# Patient Record
Sex: MALE | Race: BLACK OR AFRICAN AMERICAN | NOT HISPANIC OR LATINO | URBAN - METROPOLITAN AREA
[De-identification: names, ages, dates, MRNs, and addresses within clinical notes are randomized per-mention and may not be internally consistent; named-entity substitution may affect disease eponyms.]

---

## 2017-07-26 ENCOUNTER — EMERGENCY (EMERGENCY)
Facility: HOSPITAL | Age: 27
LOS: 1 days | Discharge: HOME | End: 2017-07-26
Attending: EMERGENCY MEDICINE
Payer: SELF-PAY

## 2017-07-26 VITALS
TEMPERATURE: 99 F | HEART RATE: 109 BPM | DIASTOLIC BLOOD PRESSURE: 133 MMHG | WEIGHT: 315 LBS | RESPIRATION RATE: 20 BRPM | OXYGEN SATURATION: 99 % | SYSTOLIC BLOOD PRESSURE: 205 MMHG

## 2017-07-26 VITALS
RESPIRATION RATE: 20 BRPM | HEART RATE: 104 BPM | SYSTOLIC BLOOD PRESSURE: 188 MMHG | DIASTOLIC BLOOD PRESSURE: 110 MMHG | OXYGEN SATURATION: 100 %

## 2017-07-26 PROCEDURE — 99284 EMERGENCY DEPT VISIT MOD MDM: CPT

## 2017-07-26 PROCEDURE — 99283 EMERGENCY DEPT VISIT LOW MDM: CPT

## 2017-07-26 RX ORDER — AMLODIPINE BESYLATE 2.5 MG/1
1 TABLET ORAL
Qty: 30 | Refills: 0 | OUTPATIENT
Start: 2017-07-26 | End: 2017-08-25

## 2017-07-26 RX ORDER — LISINOPRIL 2.5 MG/1
1 TABLET ORAL
Qty: 15 | Refills: 0 | OUTPATIENT
Start: 2017-07-26 | End: 2017-08-10

## 2017-07-26 RX ORDER — LISINOPRIL 2.5 MG/1
40 TABLET ORAL DAILY
Qty: 0 | Refills: 0 | Status: DISCONTINUED | OUTPATIENT
Start: 2017-07-26 | End: 2017-07-30

## 2017-07-26 RX ORDER — AMLODIPINE BESYLATE 2.5 MG/1
10 TABLET ORAL ONCE
Qty: 0 | Refills: 0 | Status: COMPLETED | OUTPATIENT
Start: 2017-07-26 | End: 2017-07-26

## 2017-07-26 RX ADMIN — AMLODIPINE BESYLATE 10 MILLIGRAM(S): 2.5 TABLET ORAL at 12:01

## 2017-07-26 RX ADMIN — LISINOPRIL 40 MILLIGRAM(S): 2.5 TABLET ORAL at 13:31

## 2017-07-26 NOTE — ED STATDOCS - CONSTITUTIONAL, MLM
normal... well appearing, well nourished, and in no apparent distress. heavy set pt in no acute distress

## 2017-07-26 NOTE — ED ADULT NURSE NOTE - OBJECTIVE STATEMENT
pt c/p lac to right wrist.  no futher complaints.  pt noted to have elevated HR and elevated BP.  assymptomatic, denies chest pain or shortness of breath.  pt states he has a hx of elevated BP but has not been taking his medications due to recent move.  medicated as ordered.  will continue to monitor.

## 2017-07-26 NOTE — ED ADULT NURSE NOTE - CHIEF COMPLAINT QUOTE
pt with lac to left wrist which he reports getting with a drill at work yesterday. pt is hypertensive at triage

## 2017-07-26 NOTE — ED STATDOCS - PROGRESS NOTE DETAILS
Pt seen and evaluated. 26 yo M pmh HTN presented to ED s/p puncture wound yesterday sustained with a drill. dT UTD. Pt noted to be HTN on arrival. pt states he was on Lisinopril 40mg but has been off x 4-5 months since moving from [place]. Pt reports recent blood work < 1 year and all normal. Pt denies HA, CP, Dizziness. Right wrist lateral aspect with superficial puncture wound/deep abrasion.  No bony ttp. FROM. No muscle or tendon invol. no erythema.  A/P 1. HTN- CCB and reeval   2. Superficial wound- local wound care

## 2017-07-26 NOTE — ED STATDOCS - OBJECTIVE STATEMENT
26 y/o M w/ PMHx of HTN presents to the ED c/o R wrist lacerations s/p a drill striking his wrist yesterday. Pt states he last received a tetanus shot 2 years ago. He works construction for a living. Pt denies numbness, tingling or any other complaints at this time.

## 2017-07-26 NOTE — ED STATDOCS - ATTENDING CONTRIBUTION TO CARE
I, Connor Chavez, performed the initial face to face bedside interview with this patient regarding history of present illness, review of symptoms and relevant past medical, social and family history.  I completed an independent physical examination.  I was the initial provider who evaluated this patient. I have signed out the follow up of any pending tests (i.e. labs, radiological studies) to the ACP.  I have communicated the patient’s plan of care and disposition with the ACP.

## 2019-02-27 ENCOUNTER — EMERGENCY (EMERGENCY)
Facility: HOSPITAL | Age: 29
LOS: 1 days | Discharge: HOME | End: 2019-02-27
Attending: STUDENT IN AN ORGANIZED HEALTH CARE EDUCATION/TRAINING PROGRAM
Payer: COMMERCIAL

## 2019-02-27 VITALS
DIASTOLIC BLOOD PRESSURE: 154 MMHG | RESPIRATION RATE: 20 BRPM | HEART RATE: 90 BPM | OXYGEN SATURATION: 100 % | TEMPERATURE: 98 F | SYSTOLIC BLOOD PRESSURE: 232 MMHG

## 2019-02-27 VITALS
WEIGHT: 289.91 LBS | OXYGEN SATURATION: 100 % | TEMPERATURE: 98 F | SYSTOLIC BLOOD PRESSURE: 170 MMHG | DIASTOLIC BLOOD PRESSURE: 151 MMHG | HEIGHT: 72 IN | RESPIRATION RATE: 20 BRPM | HEART RATE: 91 BPM

## 2019-02-27 PROBLEM — I10 ESSENTIAL (PRIMARY) HYPERTENSION: Chronic | Status: ACTIVE | Noted: 2017-07-27

## 2019-02-27 PROCEDURE — 99283 EMERGENCY DEPT VISIT LOW MDM: CPT | Mod: 25

## 2019-02-27 PROCEDURE — 73610 X-RAY EXAM OF ANKLE: CPT | Mod: 26,RT

## 2019-02-27 PROCEDURE — 73610 X-RAY EXAM OF ANKLE: CPT

## 2019-02-27 PROCEDURE — 96372 THER/PROPH/DIAG INJ SC/IM: CPT

## 2019-02-27 PROCEDURE — 99283 EMERGENCY DEPT VISIT LOW MDM: CPT

## 2019-02-27 RX ORDER — OXYCODONE AND ACETAMINOPHEN 5; 325 MG/1; MG/1
1 TABLET ORAL ONCE
Qty: 0 | Refills: 0 | Status: DISCONTINUED | OUTPATIENT
Start: 2019-02-27 | End: 2019-02-27

## 2019-02-27 RX ORDER — IBUPROFEN 200 MG
1 TABLET ORAL
Qty: 30 | Refills: 0 | OUTPATIENT
Start: 2019-02-27 | End: 2019-03-08

## 2019-02-27 RX ORDER — MORPHINE SULFATE 50 MG/1
4 CAPSULE, EXTENDED RELEASE ORAL ONCE
Qty: 0 | Refills: 0 | Status: DISCONTINUED | OUTPATIENT
Start: 2019-02-27 | End: 2019-02-27

## 2019-02-27 RX ORDER — KETOROLAC TROMETHAMINE 30 MG/ML
60 SYRINGE (ML) INJECTION ONCE
Qty: 0 | Refills: 0 | Status: DISCONTINUED | OUTPATIENT
Start: 2019-02-27 | End: 2019-02-27

## 2019-02-27 RX ADMIN — Medication 60 MILLIGRAM(S): at 16:20

## 2019-02-27 RX ADMIN — Medication 0.1 MILLIGRAM(S): at 16:52

## 2019-02-27 RX ADMIN — OXYCODONE AND ACETAMINOPHEN 1 TABLET(S): 5; 325 TABLET ORAL at 16:20

## 2019-02-27 NOTE — ED PROVIDER NOTE - PROGRESS NOTE DETAILS
GUSTAVO FULLER: contact [facility]. reading no fracture  pt splinted and given crutches with FU with ortho   discussed consequences of HTN and non compliance with medication   educated need to fu HTN with PMD and that condition can be life threatening blood pressure decreasing remains asymptomatic  again reiterating managing HTN

## 2019-02-27 NOTE — ED PROVIDER NOTE - CLINICAL SUMMARY MEDICAL DECISION MAKING FREE TEXT BOX
right ankle injury at work.  pain control antihypertensive medication and xray to eval for fracture.

## 2019-02-27 NOTE — ED PROVIDER NOTE - ATTENDING CONTRIBUTION TO CARE
I performed a face to face history and physical exam of the patient and discussed their management with the resident/ACP. I reviewed the resident/ACP's note and agree with the documented findings and plan of care.    Pt states that he was at work when he had an injury to his R ankle. Pt states that he has had pain and swelling since then.  Pt states that he has a hx of htn but has not had meds in 2 years.  no cp, sob, numbness/weakness.    physical - R ankle with Lateral mal ttp and swelling.    plan - XR reviewed and negative.  splint placed. Pt given clonidine in ER. Pre-discharge BP had improved to 206/150. Pt asymptomatic.  Pt instructed to f/up with pcp or [facility] clinic for new rx for bp medications. pt instructed to return for cp, sob, headache, numbness/weakness, or any other concerns.

## 2019-02-27 NOTE — ED PROVIDER NOTE - OBJECTIVE STATEMENT
28 year old male hx of HTN non compliant with medication presenting to ER after injury at work when he was hit in the right foot by a tractor. states that incident occurred around 3 pm. no pain medication taken. unable to walk on the foot. no numbness or tingling. pain primarily to the lateral aspect of the ankle. no calf pain. no cp sob, headache, nausea or visual changes.  states that he does not have insurance so he does not take medication for HTN

## 2019-02-27 NOTE — ED PROVIDER NOTE - PHYSICAL EXAMINATION
RIGHT ANKLE: left lateral ankle deformity. with ecchymosis and swelling to the lateral malleolous. + TTP over lateral mal. pain with dorsiflexion and plantar flexion 2+ DP pulse. sensation intact. no calf tenderness

## 2019-02-27 NOTE — ED ADULT TRIAGE NOTE - CHIEF COMPLAINT QUOTE
pt arrive by ambulance after right foot was ran over at work. pt was wearing steel toe boot. (BP noted, hx HTN does not take meds)

## 2019-03-12 ENCOUNTER — INPATIENT (INPATIENT)
Facility: HOSPITAL | Age: 29
LOS: 3 days | Discharge: HOME | DRG: 71 | End: 2019-03-16
Attending: HOSPITALIST | Admitting: INTERNAL MEDICINE
Payer: MEDICAID

## 2019-03-12 VITALS
OXYGEN SATURATION: 100 % | DIASTOLIC BLOOD PRESSURE: 136 MMHG | RESPIRATION RATE: 19 BRPM | HEART RATE: 118 BPM | SYSTOLIC BLOOD PRESSURE: 184 MMHG | TEMPERATURE: 98 F | HEIGHT: 72 IN | WEIGHT: 285.06 LBS

## 2019-03-12 DIAGNOSIS — R56.9 UNSPECIFIED CONVULSIONS: ICD-10-CM

## 2019-03-12 DIAGNOSIS — I16.1 HYPERTENSIVE EMERGENCY: ICD-10-CM

## 2019-03-12 DIAGNOSIS — I10 ESSENTIAL (PRIMARY) HYPERTENSION: ICD-10-CM

## 2019-03-12 LAB
ALBUMIN SERPL ELPH-MCNC: 4.5 G/DL — SIGNIFICANT CHANGE UP (ref 3.3–5.2)
ALP SERPL-CCNC: 61 U/L — SIGNIFICANT CHANGE UP (ref 40–120)
ALT FLD-CCNC: 23 U/L — SIGNIFICANT CHANGE UP
AMPHET UR-MCNC: NEGATIVE — SIGNIFICANT CHANGE UP
ANION GAP SERPL CALC-SCNC: 18 MMOL/L — HIGH (ref 5–17)
ANISOCYTOSIS BLD QL: SLIGHT — SIGNIFICANT CHANGE UP
APAP SERPL-MCNC: <7.5 UG/ML — LOW (ref 10–26)
APPEARANCE UR: CLEAR — SIGNIFICANT CHANGE UP
AST SERPL-CCNC: 19 U/L — SIGNIFICANT CHANGE UP
BACTERIA # UR AUTO: ABNORMAL
BARBITURATES UR SCN-MCNC: NEGATIVE — SIGNIFICANT CHANGE UP
BASOPHILS # BLD AUTO: 0 K/UL — SIGNIFICANT CHANGE UP (ref 0–0.2)
BASOPHILS NFR BLD AUTO: 0.3 % — SIGNIFICANT CHANGE UP (ref 0–2)
BENZODIAZ UR-MCNC: NEGATIVE — SIGNIFICANT CHANGE UP
BILIRUB SERPL-MCNC: 0.4 MG/DL — SIGNIFICANT CHANGE UP (ref 0.4–2)
BILIRUB UR-MCNC: NEGATIVE — SIGNIFICANT CHANGE UP
BUN SERPL-MCNC: 12 MG/DL — SIGNIFICANT CHANGE UP (ref 8–20)
CALCIUM SERPL-MCNC: 9.9 MG/DL — SIGNIFICANT CHANGE UP (ref 8.6–10.2)
CHLORIDE SERPL-SCNC: 99 MMOL/L — SIGNIFICANT CHANGE UP (ref 98–107)
CO2 SERPL-SCNC: 24 MMOL/L — SIGNIFICANT CHANGE UP (ref 22–29)
COCAINE METAB.OTHER UR-MCNC: NEGATIVE — SIGNIFICANT CHANGE UP
COLOR SPEC: YELLOW — SIGNIFICANT CHANGE UP
CREAT SERPL-MCNC: 1.24 MG/DL — SIGNIFICANT CHANGE UP (ref 0.5–1.3)
DIFF PNL FLD: ABNORMAL
EOSINOPHIL # BLD AUTO: 0.3 K/UL — SIGNIFICANT CHANGE UP (ref 0–0.5)
EOSINOPHIL NFR BLD AUTO: 3.4 % — SIGNIFICANT CHANGE UP (ref 0–5)
EPI CELLS # UR: SIGNIFICANT CHANGE UP
ETHANOL SERPL-MCNC: <10 MG/DL — SIGNIFICANT CHANGE UP
GLUCOSE SERPL-MCNC: 147 MG/DL — HIGH (ref 70–115)
GLUCOSE UR QL: 50 MG/DL
HCT VFR BLD CALC: 40.5 % — LOW (ref 42–52)
HGB BLD-MCNC: 15.1 G/DL — SIGNIFICANT CHANGE UP (ref 14–18)
KETONES UR-MCNC: NEGATIVE — SIGNIFICANT CHANGE UP
LEUKOCYTE ESTERASE UR-ACNC: ABNORMAL
LYMPHOCYTES # BLD AUTO: 2.2 K/UL — SIGNIFICANT CHANGE UP (ref 1–4.8)
LYMPHOCYTES # BLD AUTO: 22.8 % — SIGNIFICANT CHANGE UP (ref 20–55)
MACROCYTES BLD QL: SLIGHT — SIGNIFICANT CHANGE UP
MAGNESIUM SERPL-MCNC: 2.1 MG/DL — SIGNIFICANT CHANGE UP (ref 1.6–2.6)
MCHC RBC-ENTMCNC: 32 PG — HIGH (ref 27–31)
MCHC RBC-ENTMCNC: 37.3 G/DL — HIGH (ref 32–36)
MCV RBC AUTO: 85.8 FL — SIGNIFICANT CHANGE UP (ref 80–94)
METHADONE UR-MCNC: NEGATIVE — SIGNIFICANT CHANGE UP
MICROCYTES BLD QL: SLIGHT — SIGNIFICANT CHANGE UP
MONOCYTES # BLD AUTO: 0.8 K/UL — SIGNIFICANT CHANGE UP (ref 0–0.8)
MONOCYTES NFR BLD AUTO: 8.3 % — SIGNIFICANT CHANGE UP (ref 3–10)
NEUTROPHILS # BLD AUTO: 6.3 K/UL — SIGNIFICANT CHANGE UP (ref 1.8–8)
NEUTROPHILS NFR BLD AUTO: 64.7 % — SIGNIFICANT CHANGE UP (ref 37–73)
NITRITE UR-MCNC: NEGATIVE — SIGNIFICANT CHANGE UP
OPIATES UR-MCNC: NEGATIVE — SIGNIFICANT CHANGE UP
OVALOCYTES BLD QL SMEAR: SLIGHT — SIGNIFICANT CHANGE UP
PCP SPEC-MCNC: SIGNIFICANT CHANGE UP
PCP UR-MCNC: NEGATIVE — SIGNIFICANT CHANGE UP
PH UR: 7 — SIGNIFICANT CHANGE UP (ref 5–8)
PLAT MORPH BLD: NORMAL — SIGNIFICANT CHANGE UP
PLATELET # BLD AUTO: 239 K/UL — SIGNIFICANT CHANGE UP (ref 150–400)
POIKILOCYTOSIS BLD QL AUTO: SLIGHT — SIGNIFICANT CHANGE UP
POTASSIUM SERPL-MCNC: 3.3 MMOL/L — LOW (ref 3.5–5.3)
POTASSIUM SERPL-SCNC: 3.3 MMOL/L — LOW (ref 3.5–5.3)
PROT SERPL-MCNC: 7.8 G/DL — SIGNIFICANT CHANGE UP (ref 6.6–8.7)
PROT UR-MCNC: 30 MG/DL
RBC # BLD: 4.72 M/UL — SIGNIFICANT CHANGE UP (ref 4.6–6.2)
RBC # FLD: 13 % — SIGNIFICANT CHANGE UP (ref 11–15.6)
RBC BLD AUTO: ABNORMAL
RBC CASTS # UR COMP ASSIST: ABNORMAL /HPF (ref 0–4)
SALICYLATES SERPL-MCNC: <0.6 MG/DL — LOW (ref 10–20)
SODIUM SERPL-SCNC: 141 MMOL/L — SIGNIFICANT CHANGE UP (ref 135–145)
SP GR SPEC: 1.01 — SIGNIFICANT CHANGE UP (ref 1.01–1.02)
THC UR QL: POSITIVE
TROPONIN T SERPL-MCNC: 0.04 NG/ML — SIGNIFICANT CHANGE UP (ref 0–0.06)
TSH SERPL-MCNC: 1.8 UIU/ML — SIGNIFICANT CHANGE UP (ref 0.27–4.2)
UROBILINOGEN FLD QL: NEGATIVE MG/DL — SIGNIFICANT CHANGE UP
WBC # BLD: 9.8 K/UL — SIGNIFICANT CHANGE UP (ref 4.8–10.8)
WBC # FLD AUTO: 9.8 K/UL — SIGNIFICANT CHANGE UP (ref 4.8–10.8)
WBC UR QL: ABNORMAL

## 2019-03-12 PROCEDURE — 70450 CT HEAD/BRAIN W/O DYE: CPT | Mod: 26

## 2019-03-12 PROCEDURE — 99223 1ST HOSP IP/OBS HIGH 75: CPT

## 2019-03-12 PROCEDURE — 74174 CTA ABD&PLVS W/CONTRAST: CPT | Mod: 26

## 2019-03-12 PROCEDURE — 71045 X-RAY EXAM CHEST 1 VIEW: CPT | Mod: 26

## 2019-03-12 PROCEDURE — 71275 CT ANGIOGRAPHY CHEST: CPT | Mod: 26

## 2019-03-12 PROCEDURE — 99291 CRITICAL CARE FIRST HOUR: CPT

## 2019-03-12 RX ORDER — OXYCODONE HYDROCHLORIDE 5 MG/1
5 TABLET ORAL EVERY 6 HOURS
Qty: 0 | Refills: 0 | Status: DISCONTINUED | OUTPATIENT
Start: 2019-03-12 | End: 2019-03-16

## 2019-03-12 RX ORDER — POTASSIUM CHLORIDE 20 MEQ
40 PACKET (EA) ORAL ONCE
Qty: 0 | Refills: 0 | Status: COMPLETED | OUTPATIENT
Start: 2019-03-12 | End: 2019-03-12

## 2019-03-12 RX ORDER — SODIUM CHLORIDE 9 MG/ML
1000 INJECTION INTRAMUSCULAR; INTRAVENOUS; SUBCUTANEOUS ONCE
Qty: 0 | Refills: 0 | Status: COMPLETED | OUTPATIENT
Start: 2019-03-12 | End: 2019-03-12

## 2019-03-12 RX ORDER — AMLODIPINE BESYLATE 2.5 MG/1
10 TABLET ORAL DAILY
Qty: 0 | Refills: 0 | Status: DISCONTINUED | OUTPATIENT
Start: 2019-03-13 | End: 2019-03-13

## 2019-03-12 RX ORDER — ACETAMINOPHEN 500 MG
975 TABLET ORAL ONCE
Qty: 0 | Refills: 0 | Status: COMPLETED | OUTPATIENT
Start: 2019-03-12 | End: 2019-03-12

## 2019-03-12 RX ORDER — LABETALOL HCL 100 MG
400 TABLET ORAL THREE TIMES A DAY
Qty: 0 | Refills: 0 | Status: DISCONTINUED | OUTPATIENT
Start: 2019-03-12 | End: 2019-03-13

## 2019-03-12 RX ORDER — ENOXAPARIN SODIUM 100 MG/ML
40 INJECTION SUBCUTANEOUS DAILY
Qty: 0 | Refills: 0 | Status: DISCONTINUED | OUTPATIENT
Start: 2019-03-12 | End: 2019-03-16

## 2019-03-12 RX ORDER — LEVETIRACETAM 250 MG/1
500 TABLET, FILM COATED ORAL EVERY 12 HOURS
Qty: 0 | Refills: 0 | Status: DISCONTINUED | OUTPATIENT
Start: 2019-03-12 | End: 2019-03-14

## 2019-03-12 RX ORDER — MORPHINE SULFATE 50 MG/1
4 CAPSULE, EXTENDED RELEASE ORAL ONCE
Qty: 0 | Refills: 0 | Status: DISCONTINUED | OUTPATIENT
Start: 2019-03-12 | End: 2019-03-13

## 2019-03-12 RX ORDER — HYDROCHLOROTHIAZIDE 25 MG
25 TABLET ORAL DAILY
Qty: 0 | Refills: 0 | Status: DISCONTINUED | OUTPATIENT
Start: 2019-03-12 | End: 2019-03-14

## 2019-03-12 RX ORDER — HYDRALAZINE HCL 50 MG
10 TABLET ORAL ONCE
Qty: 0 | Refills: 0 | Status: COMPLETED | OUTPATIENT
Start: 2019-03-12 | End: 2019-03-12

## 2019-03-12 RX ORDER — AMLODIPINE BESYLATE 2.5 MG/1
10 TABLET ORAL ONCE
Qty: 0 | Refills: 0 | Status: COMPLETED | OUTPATIENT
Start: 2019-03-12 | End: 2019-03-12

## 2019-03-12 RX ORDER — NICARDIPINE HYDROCHLORIDE 30 MG/1
5 CAPSULE, EXTENDED RELEASE ORAL
Qty: 40 | Refills: 0 | Status: DISCONTINUED | OUTPATIENT
Start: 2019-03-12 | End: 2019-03-13

## 2019-03-12 RX ORDER — LABETALOL HCL 100 MG
10 TABLET ORAL ONCE
Qty: 0 | Refills: 0 | Status: COMPLETED | OUTPATIENT
Start: 2019-03-12 | End: 2019-03-12

## 2019-03-12 RX ORDER — ACETAMINOPHEN 500 MG
650 TABLET ORAL EVERY 6 HOURS
Qty: 0 | Refills: 0 | Status: DISCONTINUED | OUTPATIENT
Start: 2019-03-12 | End: 2019-03-16

## 2019-03-12 RX ADMIN — SODIUM CHLORIDE 1000 MILLILITER(S): 9 INJECTION INTRAMUSCULAR; INTRAVENOUS; SUBCUTANEOUS at 16:41

## 2019-03-12 RX ADMIN — Medication 10 MILLIGRAM(S): at 16:42

## 2019-03-12 RX ADMIN — Medication 10 MILLIGRAM(S): at 15:46

## 2019-03-12 RX ADMIN — Medication 975 MILLIGRAM(S): at 17:47

## 2019-03-12 RX ADMIN — Medication 975 MILLIGRAM(S): at 20:11

## 2019-03-12 RX ADMIN — NICARDIPINE HYDROCHLORIDE 25 MG/HR: 30 CAPSULE, EXTENDED RELEASE ORAL at 17:47

## 2019-03-12 RX ADMIN — SODIUM CHLORIDE 4000 MILLILITER(S): 9 INJECTION INTRAMUSCULAR; INTRAVENOUS; SUBCUTANEOUS at 15:10

## 2019-03-12 RX ADMIN — Medication 40 MILLIEQUIVALENT(S): at 16:42

## 2019-03-12 RX ADMIN — Medication 400 MILLIGRAM(S): at 22:36

## 2019-03-12 RX ADMIN — AMLODIPINE BESYLATE 10 MILLIGRAM(S): 2.5 TABLET ORAL at 16:42

## 2019-03-12 RX ADMIN — Medication 10 MILLIGRAM(S): at 15:09

## 2019-03-12 RX ADMIN — Medication 25 MILLIGRAM(S): at 20:40

## 2019-03-12 NOTE — ED PROVIDER NOTE - CLINICAL SUMMARY MEDICAL DECISION MAKING FREE TEXT BOX
28M p/w new onset ?seizure. Drug related vs. electrolyte abnormality vs. intracranial pathology. Will get labs, tox, CTH. 28M p/w new onset ?seizure vs syncope. Drug related vs. electrolyte abnormality vs. intracranial pathology. Will get labs, tox, CTH.

## 2019-03-12 NOTE — ED PROVIDER NOTE - OBJECTIVE STATEMENT
28M pmhx of HTN present for witnessed seizure at home, described by father as shaking for about 5 min and a period of confusion for about 10 min afterwards. Denies incontinence, tongue biting. Never had seizures, no fhx of seizures. Patient admits to smoking marijuana prior to event. Denies any other drug use or EtOH abuse, as a few drinks a week, last 2 days ago. Of note, had injured his right foot 2/22, had MRI done today. Was prescribed oxycodone for LE pain. Denies fevers, chills, N/V/D, headache, vision changes, urinary complaints.

## 2019-03-12 NOTE — H&P ADULT - NSHPREVIEWOFSYSTEMS_GEN_ALL_CORE
CONST: denies fevers, no chills  EYES: denies pain or vision changes  ENT: denies sore throat, ear pain, change in hearing  CV: denies CP, LE Edema, palpitations  RESP: denies SOB, cough  ABD: denies abdominal pain, no nausea, no vomiting, no diarrhea  : denies dysuria, flank pain, hematuria  MSK: denies back pain, + RL Extremity pain s/p injury  NEURO: denies HA, weakness, sensory deficits   HEME: denies easy bleeding, epistaxis  SKIN: denies rash

## 2019-03-12 NOTE — ED ADULT TRIAGE NOTE - CHIEF COMPLAINT QUOTE
Pt had a witnessed seizure that lasted approximately 5 mins by his father. Pt has no seizure history. Pt hypertensive and tachycardic at this time. Pt denies any headache or chest pain

## 2019-03-12 NOTE — ED PROVIDER NOTE - CRITICAL CARE PROVIDED
interpretation of diagnostic studies/consultation with other physicians/direct patient care (not related to procedure)/additional history taking/documentation/consult w/ pt's family directly relating to pts condition

## 2019-03-12 NOTE — H&P ADULT - NSICDXPROBLEM_GEN_ALL_CORE_FT
PROBLEM DIAGNOSES  Problem: Seizure  Assessment and Plan:     Problem: Hypertensive emergency  Assessment and Plan:       R/O PROBLEM DIAGNOSES  Problem: Posterior reversible encephalopathy syndrome  Assessment and Plan:

## 2019-03-12 NOTE — H&P ADULT - ASSESSMENT
27 y/o M, PMHx uncontrolled HTN, R Ankle Fx, p/w witnessed seizure, found to have hypertensive emergency.     Seizure likely 2/2 hypertensive emergency, r/o PRES syndrome    given Norvasc 10mg PO, Hydralazine 20mg IV, labetalol 10 mg IV  Cont titrating Nicardipine ggt,   EEG  Neuro Consult  MRI Brain w/ Gadolinium   monitor CBC, BMP  5mg Oxycodone prn for pain 27 yo male with known HTN, noncompliant with meds, presents with hypertensive emergency and witnessed tonic clonic seizure.   Being admitted to the ICU for nicardipine drip.    Plan:  1) Seizure:  Possibly due to PRES, will obtain MRI brain, EEG, needs neurology evaluation in AM (not yet called).  Started on Keppra.     2) Hypertensive emergency:  Started on PO labetalol and HCTZ in order to wean off nicardipine drip.  Its unusual for a young male to have this degree of resistant HTN, will order secondary HTN workup to rule out renal artery stenosis, pheochromocytoma.  TTE ordered as well.    3) Abdominal pain: had brief episode of severe abdominal pain in ICU, ordered for CTA to rule out dissection in setting of uncontrolled hypertension.

## 2019-03-12 NOTE — PHARMACOTHERAPY INTERVENTION NOTE - COMMENTS
Medication reconciliation completed with patient at bedside. Patient was on lisinopril 40 mG and amlodipine 10 mG for HTN management. He has not taken his blood pressure medications in more than 4 months due to insurance issues.

## 2019-03-12 NOTE — ED ADULT NURSE NOTE - NSIMPLEMENTINTERV_GEN_ALL_ED
Implemented All Fall Risk Interventions:  [place] to call system. Call bell, personal items and telephone within reach. Instruct patient to call for assistance. Room bathroom lighting operational. Non-slip footwear when patient is off stretcher. Physically safe environment: no spills, clutter or unnecessary equipment. Stretcher in lowest position, wheels locked, appropriate side rails in place. Provide visual cue, wrist band, yellow gown, etc. Monitor gait and stability. Monitor for mental status changes and reorient to person, place, and time. Review medications for side effects contributing to fall risk. Reinforce activity limits and safety measures with patient and family.

## 2019-03-12 NOTE — ED PROVIDER NOTE - PROGRESS NOTE DETAILS
patient remains asymptomatic from HTN, has not taken meds in >1 year. last in ED with /150. improved with IV meds, trop .02 and LVH on EKG. will continue to try and control BP with IV push and PO meds if needed gtt. will likely admit -Pura DO unable to control BP, now complaining of HA, neuro intact. will start cardene gtt, MICU consulted -Pura MILLS

## 2019-03-12 NOTE — ED PROVIDER NOTE - NS ED ROS FT
CONST: no fevers, no chills  EYES: no pain, no vision changes  ENT: no sore throat, no ear pain, no change in hearing  CV: no chest pain, no leg swelling  RESP: no shortness of breath, no cough  ABD: no abdominal pain, no nausea, no vomiting, no diarrhea  : no dysuria, no flank pain, no hematuria  MSK: no back pain, +RLE pain after injury  NEURO: no headache or additional neurologic complaints  HEME: no easy bleeding  SKIN:  no rash

## 2019-03-12 NOTE — ED ADULT NURSE REASSESSMENT NOTE - NS ED NURSE REASSESS COMMENT FT1
pt resting in bed no acute distress noted aox 3 moving all extremities equal bilaterally, offers no complaints.
pt BP is 200/111. MD Neves advised of BP. pt medicated as per orders. SO in bed with pt and father at bedside. pt remains sinus tach on cardiac monitor. pt educated on plan of care, pt able to successfully teach back plan of care to RN, RN will continue to reeducate pt during hospital stay.

## 2019-03-12 NOTE — H&P ADULT - NSHPPHYSICALEXAM_GEN_ALL_CORE
GENERAL: NAD, well groomed,   HEAD:  NC/AT  EYES: EOMI, PERRLA, sclera and conjunctiva clear  ENMT:  MMM, No oropharyngeal erythema or exudates  NECK:  Supple, trachea midline, no JVD   NERVOUS SYSTEM:  A&Ox3, CN 2-12 grossly intact, Strength 5/5 in all extremities, sensorium intact, no focal deficits  LUNG: CTA b/l, no wheezes, rales, rhonchi  HEART: RRR, No murmurs, rubs, or gallops  ABDOMEN: Soft, Obese, Nontender, No rebound or guarding, normoactive BS  EXTREMITIES:  No edema, clubbing, cyanosis.  LYMPH:  No lymphadenopathy noted  SKIN:  No visible rashes or skin lesions, good capillary refill  PSYCH: appropriate affect

## 2019-03-12 NOTE — ED ADULT NURSE NOTE - OBJECTIVE STATEMENT
pt had a witnessed seizure at home today that lasted 5 minutes family states pt has no history of seizures. pt arrived awake and alert with right lower leg brace from previous injury.

## 2019-03-12 NOTE — ED PROVIDER NOTE - PHYSICAL EXAMINATION
Liz Coronado, .:   GENERAL: Patient awake alert NAD.  HEENT: NC/AT, Moist mucous membranes, PERRL, EOMI, no tongue laceration.   LUNGS: CTAB, no wheezes or crackles.   CARDIAC: RRR, no m/r/g.    ABDOMEN: Obese, Soft, NT, ND, No rebound, guarding.   EXT: No edema. No calf tenderness. Leg boot and ace wrap present, removed.  MSK: No spinal tenderness, no pain with movement, no deformities.  NEURO: A&Ox3. Gait normal.    SKIN: Warm and dry. No rash.  PSYCH: Normal affect. Liz Coronado, .:   GENERAL: Patient awake alert NAD.  HEENT: NC/AT, Moist mucous membranes, PERRL, EOMI, no tongue laceration.   LUNGS: CTAB, no wheezes or crackles.   CARDIAC: RRR, no m/r/g.    ABDOMEN: Obese, Soft, NT, ND, No rebound, guarding.   EXT: No edema. No calf tenderness. Leg boot and ace wrap present, removed.  MSK: No spinal tenderness, no pain with movement, no deformities.  NEURO: A&Ox3. Moving all extremities.  SKIN: Warm and dry. No rash.  PSYCH: Normal affect.

## 2019-03-12 NOTE — H&P ADULT - NSHPLABSRESULTS_GEN_ALL_CORE
15.1   9.8   )-----------( 239      ( 12 Mar 2019 15:12 )             40.5    03-12    141  |  99  |  12.0  ----------------------------<  147<H>  3.3<L>   |  24.0  |  1.24    Ca    9.9      12 Mar 2019 15:12  Mg     2.1         TPro  7.8  /  Alb  4.5  /  TBili  0.4  /  DBili  x   /  AST  19  /  ALT  23  /  AlkPhos  61  03-12    3-12, 15:12  TSH: 1.8  Troponin: 0.02    Urinalysis Basic - ( 12 Mar 2019 16:33 )    Color: Yellow / Appearance: Clear / S.015 / pH: x  Gluc: x / Ketone: Negative  / Bili: Negative / Urobili: Negative mg/dL   Blood: x / Protein: 30 mg/dL / Nitrite: Negative   Leuk Esterase: Trace / RBC: 3-5 /HPF / WBC 6-10   Sq Epi: x / Non Sq Epi: Occasional / Bacteria: Occasional    Urine Toxicology 3/12  Benzodiazepine neg  THC positive  Cocaine metabolite neg  Amphetamine neg   Opiate neg  Barbituates neg  Phencyclidine neg  Methadone neg  Salicylate level <0.6  Acetaminophen level <7.5  Alcohol <10    CT Head no cont 3/12, 16:30    FINDINGS:      HEMISPHERES:  No mass or space occupying lesion.  No acute ischemic   changes or hemorrhagic foci are suggested.  VENTRICLES:  Midline and normal in size.  POSTERIOR FOSSA:  The brain stem and cerebellum are unremarkable.  No CP   angle lesion noted.  EXTRACEREBRAL SPACES:  No subdural or epidural collections are noted.  SKULL BASE AND CALVARIUM:  Appears intact.  No fracture or destructive   lesion is identified.  SINUSES AND MASTOIDS:  Clear.  MISCELLANEOUS:  No orbital or suprasellar abnormality noted.      IMPRESSION:    1)  unremarkable CT study of the brain  2)  clear sinuses and mastoids..    CXR 3/12, 15:12    Overlying EKG leads are noted. The heart and mediastinal contours are   normal.  No segmental infiltrates or effusions are identified. No   pneumothorax.       IMPRESSION: Negative Portable Chest.    12 Lead EKG 3/12, 14:44    Ventricular Rate 117 BPM    Atrial Rate 117 BPM    P-R Interval 160 ms    QRS Duration 102 ms    Q-T Interval 348 ms    QTC Calculation(Bezet) 485 ms    P Axis 15 degrees    R Axis -49 degrees    T Axis 127 degrees    Diagnosis Line Sinus tachycardia  Left axis deviation  Left ventricular hypertrophy with repolarization abnormality  Abnormal ECG    Confirmed by PEARL CHUA ([id number]) on 3/12/2019 5:47:09 PM

## 2019-03-13 LAB
ANION GAP SERPL CALC-SCNC: 15 MMOL/L — SIGNIFICANT CHANGE UP (ref 5–17)
APPEARANCE UR: CLEAR — SIGNIFICANT CHANGE UP
BACTERIA # UR AUTO: ABNORMAL
BILIRUB UR-MCNC: NEGATIVE — SIGNIFICANT CHANGE UP
BUN SERPL-MCNC: 10 MG/DL — SIGNIFICANT CHANGE UP (ref 8–20)
CALCIUM SERPL-MCNC: 9.1 MG/DL — SIGNIFICANT CHANGE UP (ref 8.6–10.2)
CHLORIDE SERPL-SCNC: 99 MMOL/L — SIGNIFICANT CHANGE UP (ref 98–107)
CO2 SERPL-SCNC: 24 MMOL/L — SIGNIFICANT CHANGE UP (ref 22–29)
COLOR SPEC: YELLOW — SIGNIFICANT CHANGE UP
CREAT SERPL-MCNC: 0.85 MG/DL — SIGNIFICANT CHANGE UP (ref 0.5–1.3)
DIFF PNL FLD: NEGATIVE — SIGNIFICANT CHANGE UP
EPI CELLS # UR: SIGNIFICANT CHANGE UP
GLUCOSE SERPL-MCNC: 139 MG/DL — HIGH (ref 70–115)
GLUCOSE UR QL: NEGATIVE MG/DL — SIGNIFICANT CHANGE UP
HCT VFR BLD CALC: 39 % — LOW (ref 42–52)
HGB BLD-MCNC: 14.7 G/DL — SIGNIFICANT CHANGE UP (ref 14–18)
KETONES UR-MCNC: NEGATIVE — SIGNIFICANT CHANGE UP
LEUKOCYTE ESTERASE UR-ACNC: ABNORMAL
MAGNESIUM SERPL-MCNC: 2.1 MG/DL — SIGNIFICANT CHANGE UP (ref 1.6–2.6)
MCHC RBC-ENTMCNC: 31.7 PG — HIGH (ref 27–31)
MCHC RBC-ENTMCNC: 37.7 G/DL — HIGH (ref 32–36)
MCV RBC AUTO: 84.1 FL — SIGNIFICANT CHANGE UP (ref 80–94)
NITRITE UR-MCNC: NEGATIVE — SIGNIFICANT CHANGE UP
PH UR: 7 — SIGNIFICANT CHANGE UP (ref 5–8)
PHOSPHATE SERPL-MCNC: 4.4 MG/DL — SIGNIFICANT CHANGE UP (ref 2.4–4.7)
PLATELET # BLD AUTO: 227 K/UL — SIGNIFICANT CHANGE UP (ref 150–400)
POTASSIUM SERPL-MCNC: 3.5 MMOL/L — SIGNIFICANT CHANGE UP (ref 3.5–5.3)
POTASSIUM SERPL-SCNC: 3.5 MMOL/L — SIGNIFICANT CHANGE UP (ref 3.5–5.3)
PROT UR-MCNC: NEGATIVE MG/DL — SIGNIFICANT CHANGE UP
RBC # BLD: 4.64 M/UL — SIGNIFICANT CHANGE UP (ref 4.6–6.2)
RBC # FLD: 13.2 % — SIGNIFICANT CHANGE UP (ref 11–15.6)
RBC CASTS # UR COMP ASSIST: SIGNIFICANT CHANGE UP /HPF (ref 0–4)
SODIUM SERPL-SCNC: 138 MMOL/L — SIGNIFICANT CHANGE UP (ref 135–145)
SP GR SPEC: 1 — LOW (ref 1.01–1.02)
UROBILINOGEN FLD QL: NEGATIVE MG/DL — SIGNIFICANT CHANGE UP
WBC # BLD: 12.4 K/UL — HIGH (ref 4.8–10.8)
WBC # FLD AUTO: 12.4 K/UL — HIGH (ref 4.8–10.8)
WBC UR QL: SIGNIFICANT CHANGE UP

## 2019-03-13 PROCEDURE — 70553 MRI BRAIN STEM W/O & W/DYE: CPT | Mod: 26

## 2019-03-13 PROCEDURE — 99232 SBSQ HOSP IP/OBS MODERATE 35: CPT

## 2019-03-13 PROCEDURE — 99223 1ST HOSP IP/OBS HIGH 75: CPT

## 2019-03-13 PROCEDURE — 95819 EEG AWAKE AND ASLEEP: CPT | Mod: 26

## 2019-03-13 PROCEDURE — 12345: CPT | Mod: NC

## 2019-03-13 PROCEDURE — 93975 VASCULAR STUDY: CPT | Mod: 26

## 2019-03-13 PROCEDURE — 99233 SBSQ HOSP IP/OBS HIGH 50: CPT

## 2019-03-13 PROCEDURE — ZZZZZ: CPT

## 2019-03-13 RX ORDER — POTASSIUM CHLORIDE 20 MEQ
40 PACKET (EA) ORAL ONCE
Qty: 0 | Refills: 0 | Status: DISCONTINUED | OUTPATIENT
Start: 2019-03-13 | End: 2019-03-13

## 2019-03-13 RX ORDER — LABETALOL HCL 100 MG
10 TABLET ORAL EVERY 6 HOURS
Qty: 0 | Refills: 0 | Status: DISCONTINUED | OUTPATIENT
Start: 2019-03-13 | End: 2019-03-13

## 2019-03-13 RX ORDER — INFLUENZA VIRUS VACCINE 15; 15; 15; 15 UG/.5ML; UG/.5ML; UG/.5ML; UG/.5ML
0.5 SUSPENSION INTRAMUSCULAR ONCE
Qty: 0 | Refills: 0 | Status: COMPLETED | OUTPATIENT
Start: 2019-03-13 | End: 2019-03-13

## 2019-03-13 RX ORDER — LISINOPRIL 2.5 MG/1
10 TABLET ORAL DAILY
Qty: 0 | Refills: 0 | Status: DISCONTINUED | OUTPATIENT
Start: 2019-03-13 | End: 2019-03-15

## 2019-03-13 RX ORDER — LABETALOL HCL 100 MG
200 TABLET ORAL THREE TIMES A DAY
Qty: 0 | Refills: 0 | Status: DISCONTINUED | OUTPATIENT
Start: 2019-03-13 | End: 2019-03-14

## 2019-03-13 RX ORDER — POTASSIUM CHLORIDE 20 MEQ
40 PACKET (EA) ORAL ONCE
Qty: 0 | Refills: 0 | Status: COMPLETED | OUTPATIENT
Start: 2019-03-13 | End: 2019-03-13

## 2019-03-13 RX ORDER — SODIUM CHLORIDE 9 MG/ML
1000 INJECTION, SOLUTION INTRAVENOUS ONCE
Qty: 0 | Refills: 0 | Status: COMPLETED | OUTPATIENT
Start: 2019-03-13 | End: 2019-03-13

## 2019-03-13 RX ORDER — LABETALOL HCL 100 MG
200 TABLET ORAL ONCE
Qty: 0 | Refills: 0 | Status: COMPLETED | OUTPATIENT
Start: 2019-03-13 | End: 2019-03-13

## 2019-03-13 RX ORDER — METOPROLOL TARTRATE 50 MG
5 TABLET ORAL EVERY 6 HOURS
Qty: 0 | Refills: 0 | Status: DISCONTINUED | OUTPATIENT
Start: 2019-03-13 | End: 2019-03-16

## 2019-03-13 RX ADMIN — Medication 40 MILLIEQUIVALENT(S): at 06:33

## 2019-03-13 RX ADMIN — SODIUM CHLORIDE 2000 MILLILITER(S): 9 INJECTION, SOLUTION INTRAVENOUS at 00:24

## 2019-03-13 RX ADMIN — Medication 200 MILLIGRAM(S): at 13:39

## 2019-03-13 RX ADMIN — Medication 40 MILLIEQUIVALENT(S): at 00:23

## 2019-03-13 RX ADMIN — LEVETIRACETAM 420 MILLIGRAM(S): 250 TABLET, FILM COATED ORAL at 06:33

## 2019-03-13 RX ADMIN — OXYCODONE HYDROCHLORIDE 5 MILLIGRAM(S): 5 TABLET ORAL at 12:45

## 2019-03-13 RX ADMIN — ENOXAPARIN SODIUM 40 MILLIGRAM(S): 100 INJECTION SUBCUTANEOUS at 11:36

## 2019-03-13 RX ADMIN — Medication 200 MILLIGRAM(S): at 08:30

## 2019-03-13 RX ADMIN — LEVETIRACETAM 420 MILLIGRAM(S): 250 TABLET, FILM COATED ORAL at 18:15

## 2019-03-13 RX ADMIN — OXYCODONE HYDROCHLORIDE 5 MILLIGRAM(S): 5 TABLET ORAL at 13:45

## 2019-03-13 RX ADMIN — NICARDIPINE HYDROCHLORIDE 25 MG/HR: 30 CAPSULE, EXTENDED RELEASE ORAL at 00:24

## 2019-03-13 RX ADMIN — LISINOPRIL 10 MILLIGRAM(S): 2.5 TABLET ORAL at 22:19

## 2019-03-13 RX ADMIN — Medication 200 MILLIGRAM(S): at 22:19

## 2019-03-13 NOTE — CONSULT NOTE ADULT - ASSESSMENT
The patient is a 28y Male who is followed by neurology because of new onset seizure in setting of accelerated hypertension    Seizure  Single episode  MRI brain and EEG normal  d/c keppra  no driving until cleared, discussed this with him and he understood this prohibition of driving.    HTN  try to keep normotensive    will follow with you    Drew Lugo MD PhD   [id number]

## 2019-03-13 NOTE — PHYSICAL THERAPY INITIAL EVALUATION ADULT - ASR WT BEARING STATUS EVAL
as per pt hx of right ankle fx, was provided a CAM boot and was instructed by his orthopedist to WBAT on right LE

## 2019-03-13 NOTE — PROGRESS NOTE ADULT - ASSESSMENT
29 yo male with known HTN, noncompliant with meds, presents with hypertensive emergency and witnessed tonic clonic seizure.

## 2019-03-13 NOTE — PROGRESS NOTE ADULT - NSICDXPROBLEM_GEN_ALL_CORE_FT
PROBLEM DIAGNOSES  Problem: Seizure  Assessment and Plan: EEG planned, can be done in stepdown, neuro eval    Problem: Hypertensive emergency  Assessment and Plan: renal sono negative for stenosis, MRI negative for PRES, controlled with PO Labetalol

## 2019-03-13 NOTE — CONSULT NOTE ADULT - SUBJECTIVE AND OBJECTIVE BOX
[facility]                                     [facility] [place]                                 Brittney Shannon, & Earl                                  [address]                                        [place], [place], [address]                                             [phone]    CC: new onset seziure  HPI: The patient is a 28y Male who presented with possible seizure.  He reportedly had 5 minutes of jerking movments with emesis, no incontinence followed by diaphoresis and a 10 minute period of post-ictal confusion.  There is no tongue bite.  Hehad been smoking MJ prior to this, but is certain that it could not have been laced wiht anything as he knows the person who grows it.  He also has severe hypertension, does not medicate for this, and had an SBP in the 240's in the ED.  neuro is asked to eval.    PAST MEDICAL & SURGICAL HISTORY:  HTN (hypertension)  No significant past surgical history      MEDICATIONS  (STANDING):  enoxaparin Injectable 40 milliGRAM(s) SubCutaneous daily  hydrochlorothiazide 25 milliGRAM(s) Oral daily  labetalol 200 milliGRAM(s) Oral three times a day  levETIRAcetam  IVPB 500 milliGRAM(s) IV Intermittent every 12 hours    MEDICATIONS  (PRN):  acetaminophen   Tablet .. 650 milliGRAM(s) Oral every 6 hours PRN Mild Pain (1 - 3)  metoprolol tartrate Injectable 5 milliGRAM(s) IV Push every 6 hours PRN sbp > 180 or dbp > 110  oxyCODONE    IR 5 milliGRAM(s) Oral every 6 hours PRN Severe Pain (7 - 10)      Allergies    No Known Allergies    Intolerances        SOCIAL HISTORY:  no tob,   no alcohol   (+) MJ    FAMILY HISTORY:  FH: HTN (hypertension)        ROS: 14 point ROS negative other than what is present in HPI or below    Vital Signs Last 24 Hrs  T(C): 37.2 (13 Mar 2019 16:25), Max: 37.2 (13 Mar 2019 16:25)  T(F): 98.9 (13 Mar 2019 16:25), Max: 98.9 (13 Mar 2019 16:25)  HR: 80 (13 Mar 2019 16:47) (80 - 115)  BP: 164/102 (13 Mar 2019 16:47) (82/52 - 216/111)  BP(mean): 102 (13 Mar 2019 16:47) (62 - 131)  RR: 13 (13 Mar 2019 16:47) (13 - 46)  SpO2: 98% (13 Mar 2019 16:47) (89% - 100%)      General: NAD    Detailed Neurologic Exam:    Mental status: The patient is awake and alert and has normal attention span.  The patient is fully oriented in 3 spheres. The patient is oriented to current events. The patient is able to name objects, follow commands, repeat sentences.    Cranial nerves: Pupils equal and react symmetrically to light. There is no visual field deficit to confrontation. Extraocular motion is full with no nystagmus. There is no ptosis. Facial sensation is intact. Facial musculature is symmetric. Palate elevates symmetrically. Shoulder shrug is normal. Tongue is midline.    Motor: There is normal bulk and tone.  There is no tremor.  Strength is 5/5 in the right arm and leg.   Strength is 5/5 in the left arm and leg.    Sensation: Intact to light touch and pin in 4 extremities    Reflexes: 1-2+ throughout and plantar responses are flexor.    Cerebellar: There is no dysmetria on finger to nose testing.    Gait : deferred    LABS:                         14.7   12.4  )-----------( 227      ( 13 Mar 2019 03:18 )             39.0       03-13    138  |  99  |  10.0  ----------------------------<  139<H>  3.5   |  24.0  |  0.85    Ca    9.1      13 Mar 2019 03:18  Phos  4.4     03-13  Mg     2.1     03-13    TPro  7.8  /  Alb  4.5  /  TBili  0.4  /  DBili  x   /  AST  19  /  ALT  23  /  AlkPhos  61  03-12    RADIOLOGY & ADDITIONAL STUDIES (independently reviewed unless otherwise noted):  MRI brain wwo shereen- no acute CVA, mass or blood, no abnormal enhancement    EEG no seizures

## 2019-03-13 NOTE — PROGRESS NOTE ADULT - ASSESSMENT
28 yr old male with hypertension and marijuana abuse admitted with complaints of witnessed seizures. He was noted to have shaking movements shortly after using Marijuana. In ED, his systolic BP was 190, which initially was treated with IV antihypertensives but continued to be uncontrolled, elevated > 200 with complaints of headache. ICU was consulted for hypertensive emergency, nicardipine gtt was initiated and he was transferred for further management. Patient was not on antihypertensives at home. Labetalol and HCTZ were initiated. Keppra was given for seizures. CT head on admission was negative for stroke. He was subsequently weaned off Nicardipine gtt. Seizures attributed to possible PRES, hence MRI brain and EEG was ordered. CTA chest and abdomen was done as he complained of intermittent abdominal pain, no evidence of dissection, no pulmonary embolism and no renal artery stenosis. MRI brain did not show acute infarction. 28 yr old male with hypertension and marijuana abuse admitted with complaints of witnessed seizures. He was noted to have shaking movements shortly after using Marijuana. In ED, his systolic BP was 190, which initially was treated with IV antihypertensives but continued to be uncontrolled, elevated > 200 with complaints of headache. ICU was consulted for hypertensive emergency, nicardipine gtt was initiated and he was transferred for further management. Patient was not on antihypertensives at home. Labetalol and HCTZ were initiated. Keppra was given for seizures. CT head on admission was negative for stroke. He was subsequently weaned off Nicardipine gtt. Seizures attributed to possible PRES, hence MRI brain and EEG was ordered. CTA chest and abdomen was done as he complained of intermittent abdominal pain, no evidence of dissection, no pulmonary embolism and no renal artery stenosis. MRI brain did not show acute infarction.    1. Seizures: EEG done, results pending. Neurology to evaluate today, seizure precautions. Continue Keppra.    2. Uncontrolled hypertension: Continue Labetalol and HCTZ. Monitor BP. Work up for secondary causes for hypertension in progress. Follow up ECHO, will consider cardiology eval pending results of ECHO. Counseled to abstain from smoking and marijuana use.     Discussed with patient and ICU.

## 2019-03-14 PROBLEM — Z00.00 ENCOUNTER FOR PREVENTIVE HEALTH EXAMINATION: Status: ACTIVE | Noted: 2019-03-14

## 2019-03-14 LAB
ANION GAP SERPL CALC-SCNC: 15 MMOL/L — SIGNIFICANT CHANGE UP (ref 5–17)
BUN SERPL-MCNC: 21 MG/DL — HIGH (ref 8–20)
CALCIUM SERPL-MCNC: 9.1 MG/DL — SIGNIFICANT CHANGE UP (ref 8.6–10.2)
CHLORIDE SERPL-SCNC: 99 MMOL/L — SIGNIFICANT CHANGE UP (ref 98–107)
CO2 SERPL-SCNC: 25 MMOL/L — SIGNIFICANT CHANGE UP (ref 22–29)
CREAT SERPL-MCNC: 1.28 MG/DL — SIGNIFICANT CHANGE UP (ref 0.5–1.3)
GLUCOSE SERPL-MCNC: 110 MG/DL — SIGNIFICANT CHANGE UP (ref 70–115)
HCT VFR BLD CALC: 38.6 % — LOW (ref 42–52)
HGB BLD-MCNC: 14 G/DL — SIGNIFICANT CHANGE UP (ref 14–18)
MAGNESIUM SERPL-MCNC: 2.2 MG/DL — SIGNIFICANT CHANGE UP (ref 1.6–2.6)
MCHC RBC-ENTMCNC: 31.1 PG — HIGH (ref 27–31)
MCHC RBC-ENTMCNC: 36.3 G/DL — HIGH (ref 32–36)
MCV RBC AUTO: 85.8 FL — SIGNIFICANT CHANGE UP (ref 80–94)
PHOSPHATE SERPL-MCNC: 5.2 MG/DL — HIGH (ref 2.4–4.7)
PLATELET # BLD AUTO: 221 K/UL — SIGNIFICANT CHANGE UP (ref 150–400)
POTASSIUM SERPL-MCNC: 3.4 MMOL/L — LOW (ref 3.5–5.3)
POTASSIUM SERPL-SCNC: 3.4 MMOL/L — LOW (ref 3.5–5.3)
RBC # BLD: 4.5 M/UL — LOW (ref 4.6–6.2)
RBC # FLD: 13.2 % — SIGNIFICANT CHANGE UP (ref 11–15.6)
SODIUM SERPL-SCNC: 139 MMOL/L — SIGNIFICANT CHANGE UP (ref 135–145)
WBC # BLD: 8 K/UL — SIGNIFICANT CHANGE UP (ref 4.8–10.8)
WBC # FLD AUTO: 8 K/UL — SIGNIFICANT CHANGE UP (ref 4.8–10.8)

## 2019-03-14 PROCEDURE — 99232 SBSQ HOSP IP/OBS MODERATE 35: CPT

## 2019-03-14 RX ORDER — POTASSIUM CHLORIDE 20 MEQ
20 PACKET (EA) ORAL ONCE
Qty: 0 | Refills: 0 | Status: COMPLETED | OUTPATIENT
Start: 2019-03-14 | End: 2019-03-14

## 2019-03-14 RX ORDER — NICOTINE POLACRILEX 2 MG
1 GUM BUCCAL DAILY
Qty: 0 | Refills: 0 | Status: DISCONTINUED | OUTPATIENT
Start: 2019-03-14 | End: 2019-03-16

## 2019-03-14 RX ORDER — LABETALOL HCL 100 MG
300 TABLET ORAL THREE TIMES A DAY
Qty: 0 | Refills: 0 | Status: DISCONTINUED | OUTPATIENT
Start: 2019-03-14 | End: 2019-03-16

## 2019-03-14 RX ADMIN — OXYCODONE HYDROCHLORIDE 5 MILLIGRAM(S): 5 TABLET ORAL at 23:18

## 2019-03-14 RX ADMIN — LISINOPRIL 10 MILLIGRAM(S): 2.5 TABLET ORAL at 05:12

## 2019-03-14 RX ADMIN — OXYCODONE HYDROCHLORIDE 5 MILLIGRAM(S): 5 TABLET ORAL at 22:18

## 2019-03-14 RX ADMIN — Medication 5 MILLIGRAM(S): at 20:24

## 2019-03-14 RX ADMIN — Medication 200 MILLIGRAM(S): at 05:12

## 2019-03-14 RX ADMIN — Medication 20 MILLIEQUIVALENT(S): at 08:44

## 2019-03-14 RX ADMIN — ENOXAPARIN SODIUM 40 MILLIGRAM(S): 100 INJECTION SUBCUTANEOUS at 13:14

## 2019-03-14 RX ADMIN — Medication 300 MILLIGRAM(S): at 13:21

## 2019-03-14 RX ADMIN — LEVETIRACETAM 420 MILLIGRAM(S): 250 TABLET, FILM COATED ORAL at 05:12

## 2019-03-14 RX ADMIN — Medication 300 MILLIGRAM(S): at 21:10

## 2019-03-14 RX ADMIN — Medication 25 MILLIGRAM(S): at 05:12

## 2019-03-14 NOTE — PROGRESS NOTE ADULT - ASSESSMENT
28 yr old male with hypertension and marijuana abuse admitted with complaints of witnessed seizures. He was noted to have shaking movements shortly after using Marijuana. In ED, his systolic BP was 190, which initially was treated with IV antihypertensives but continued to be uncontrolled, elevated > 200 with complaints of headache. ICU was consulted for hypertensive emergency, nicardipine gtt was initiated and he was transferred for further management. Patient was not on antihypertensives at home. Labetalol and HCTZ were initiated. Keppra was given for seizures. CT head on admission was negative for stroke. He was subsequently weaned off Nicardipine gtt. Seizures attributed to possible PRES, hence MRI brain and EEG was ordered. CTA chest and abdomen was done as he complained of intermittent abdominal pain, no evidence of dissection, no pulmonary embolism and no renal artery stenosis. MRI brain did not show acute infarction.    1. Seizures: EEG done, no seizure like activity  Neurology to evaluate and recommended no seizure meds as it was first time seizure, no driving util cleared, continue with seizure precautions. Continue Keppra.    2. Uncontrolled hypertension: BP is better now, will change Labetalol to 200mg three times a day and d/c HCTZ as patient BMP showed elevation in creatinine, Work up for secondary causes for hypertension in progress, US doppler of the kidney is negative, TTE done showed LVH, will continue to monitor BP, social workers consulted for insurance issues and unable to buy meds.     Hx of smoking: Counseled to abstain from smoking and marijuana use, counselled at length will provide nicotine patches.      Discussed with patient and ICU. 28 yr old male with hypertension and marijuana abuse admitted with complaints of witnessed seizures. He was noted to have shaking movements shortly after using Marijuana. In ED, his systolic BP was 190, which initially was treated with IV antihypertensives but continued to be uncontrolled, elevated > 200 with complaints of headache. ICU was consulted for hypertensive emergency, nicardipine gtt was initiated and he was transferred for further management. Patient was not on antihypertensives at home. Labetalol and HCTZ were initiated. Keppra was given for seizures. CT head on admission was negative for stroke. He was subsequently weaned off Nicardipine gtt. Seizures attributed to possible PRES, hence MRI brain and EEG was ordered. CTA chest and abdomen was done as he complained of intermittent abdominal pain, no evidence of dissection, no pulmonary embolism and no renal artery stenosis. MRI brain did not show acute infarction.    1. Seizures: EEG done, no seizure like activity  Neurology to evaluate and recommended no seizure meds as it was first time seizure, no driving util cleared, continue with seizure precautions. Continue Keppra.    2. Uncontrolled hypertension: BP is better now, will change Labetalol to 300mg three times a day and d/c HCTZ as patient BMP showed elevation in creatinine, Work up for secondary causes for hypertension in progress, US doppler of the kidney is negative, TTE done showed LVH, will continue to monitor BP, social workers consulted for insurance issues and unable to buy meds.     Hx of smoking: Counseled to abstain from smoking and marijuana use, counselled at length will provide nicotine patches.      Discussed with patient and ICU.

## 2019-03-14 NOTE — PROGRESS NOTE ADULT - ASSESSMENT
The patient is a 28y Male who is followed by neurology because of new onset seizure in setting of accelerated hypertension    Seizure  Single episode  no further seizures  possibly due to accelerated HTN  MRI brain and EEG normal  d/c keppra  no driving until cleared, discussed this with him and he understood this prohibition of driving.    HTN  try to keep normotensive    He may d/c from neuro perspective with o/p follow up once medically stable    Thank you for allowing me to participate in the care of your patient    Drew Lugo MD, PhD   [id number]

## 2019-03-15 ENCOUNTER — TRANSCRIPTION ENCOUNTER (OUTPATIENT)
Age: 29
End: 2019-03-15

## 2019-03-15 LAB
ANION GAP SERPL CALC-SCNC: 14 MMOL/L — SIGNIFICANT CHANGE UP (ref 5–17)
ANION GAP SERPL CALC-SCNC: 26 MMOL/L — HIGH (ref 5–17)
BUN SERPL-MCNC: 27 MG/DL — HIGH (ref 8–20)
BUN SERPL-MCNC: 29 MG/DL — HIGH (ref 8–20)
CALCIUM SERPL-MCNC: 8.9 MG/DL — SIGNIFICANT CHANGE UP (ref 8.6–10.2)
CALCIUM SERPL-MCNC: 9 MG/DL — SIGNIFICANT CHANGE UP (ref 8.6–10.2)
CHLORIDE SERPL-SCNC: 101 MMOL/L — SIGNIFICANT CHANGE UP (ref 98–107)
CHLORIDE SERPL-SCNC: 91 MMOL/L — LOW (ref 98–107)
CO2 SERPL-SCNC: 25 MMOL/L — SIGNIFICANT CHANGE UP (ref 22–29)
CO2 SERPL-SCNC: 28 MMOL/L — SIGNIFICANT CHANGE UP (ref 22–29)
CREAT SERPL-MCNC: 1.44 MG/DL — HIGH (ref 0.5–1.3)
CREAT SERPL-MCNC: 1.63 MG/DL — HIGH (ref 0.5–1.3)
GLUCOSE SERPL-MCNC: 92 MG/DL — SIGNIFICANT CHANGE UP (ref 70–115)
GLUCOSE SERPL-MCNC: 99 MG/DL — SIGNIFICANT CHANGE UP (ref 70–115)
POTASSIUM SERPL-MCNC: 3.5 MMOL/L — SIGNIFICANT CHANGE UP (ref 3.5–5.3)
POTASSIUM SERPL-MCNC: 4 MMOL/L — SIGNIFICANT CHANGE UP (ref 3.5–5.3)
POTASSIUM SERPL-SCNC: 3.5 MMOL/L — SIGNIFICANT CHANGE UP (ref 3.5–5.3)
POTASSIUM SERPL-SCNC: 4 MMOL/L — SIGNIFICANT CHANGE UP (ref 3.5–5.3)
SODIUM SERPL-SCNC: 140 MMOL/L — SIGNIFICANT CHANGE UP (ref 135–145)
SODIUM SERPL-SCNC: 145 MMOL/L — SIGNIFICANT CHANGE UP (ref 135–145)

## 2019-03-15 PROCEDURE — 99232 SBSQ HOSP IP/OBS MODERATE 35: CPT

## 2019-03-15 RX ORDER — AMLODIPINE BESYLATE 2.5 MG/1
1 TABLET ORAL
Qty: 30 | Refills: 2
Start: 2019-03-15 | End: 2019-06-12

## 2019-03-15 RX ORDER — AMLODIPINE BESYLATE 2.5 MG/1
10 TABLET ORAL ONCE
Qty: 0 | Refills: 0 | Status: COMPLETED | OUTPATIENT
Start: 2019-03-15 | End: 2019-03-15

## 2019-03-15 RX ORDER — ACETAMINOPHEN 500 MG
2 TABLET ORAL
Qty: 0 | Refills: 0 | DISCHARGE
Start: 2019-03-15

## 2019-03-15 RX ORDER — AMLODIPINE BESYLATE 2.5 MG/1
10 TABLET ORAL DAILY
Qty: 0 | Refills: 0 | Status: DISCONTINUED | OUTPATIENT
Start: 2019-03-16 | End: 2019-03-16

## 2019-03-15 RX ORDER — SODIUM CHLORIDE 9 MG/ML
1000 INJECTION, SOLUTION INTRAVENOUS
Qty: 0 | Refills: 0 | Status: DISCONTINUED | OUTPATIENT
Start: 2019-03-15 | End: 2019-03-15

## 2019-03-15 RX ORDER — LABETALOL HCL 100 MG
1 TABLET ORAL
Qty: 90 | Refills: 2
Start: 2019-03-15 | End: 2019-06-12

## 2019-03-15 RX ORDER — NICOTINE POLACRILEX 2 MG
1 GUM BUCCAL
Qty: 19 | Refills: 0
Start: 2019-03-15 | End: 2019-04-02

## 2019-03-15 RX ORDER — AMLODIPINE BESYLATE 2.5 MG/1
1 TABLET ORAL
Qty: 30 | Refills: 2 | OUTPATIENT
Start: 2019-03-15 | End: 2019-06-12

## 2019-03-15 RX ADMIN — Medication 300 MILLIGRAM(S): at 14:28

## 2019-03-15 RX ADMIN — ENOXAPARIN SODIUM 40 MILLIGRAM(S): 100 INJECTION SUBCUTANEOUS at 11:27

## 2019-03-15 RX ADMIN — OXYCODONE HYDROCHLORIDE 5 MILLIGRAM(S): 5 TABLET ORAL at 22:04

## 2019-03-15 RX ADMIN — Medication 300 MILLIGRAM(S): at 22:04

## 2019-03-15 RX ADMIN — LISINOPRIL 10 MILLIGRAM(S): 2.5 TABLET ORAL at 05:20

## 2019-03-15 RX ADMIN — SODIUM CHLORIDE 100 MILLILITER(S): 9 INJECTION, SOLUTION INTRAVENOUS at 09:51

## 2019-03-15 RX ADMIN — Medication 0.25 MILLIGRAM(S): at 17:01

## 2019-03-15 RX ADMIN — OXYCODONE HYDROCHLORIDE 5 MILLIGRAM(S): 5 TABLET ORAL at 23:04

## 2019-03-15 RX ADMIN — Medication 5 MILLIGRAM(S): at 19:03

## 2019-03-15 RX ADMIN — AMLODIPINE BESYLATE 10 MILLIGRAM(S): 2.5 TABLET ORAL at 17:02

## 2019-03-15 RX ADMIN — Medication 300 MILLIGRAM(S): at 05:20

## 2019-03-15 NOTE — PROGRESS NOTE ADULT - ASSESSMENT
28 yr old male with hypertension and marijuana abuse admitted with complaints of witnessed seizures. He was noted to have shaking movements shortly after using Marijuana. In ED, his systolic BP was 190, which initially was treated with IV antihypertensives but continued to be uncontrolled, elevated > 200 with complaints of headache. ICU was consulted for hypertensive emergency, nicardipine gtt was initiated and he was transferred for further management. Patient was not on antihypertensives at home. Labetalol and HCTZ were initiated. Keppra was given for seizures. CT head on admission was negative for stroke. He was subsequently weaned off Nicardipine gtt. Seizures attributed to possible PRES, hence MRI brain and EEG was ordered. CTA chest and abdomen was done as he complained of intermittent abdominal pain, no evidence of dissection, no pulmonary embolism and no renal artery stenosis. MRI brain did not show acute infarction.    1. Seizures: EEG done, no seizure like activity  Neurology to evaluate and recommended no seizure meds as it was first time seizure, no driving util cleared, continue with seizure precautions, d/angel Keppra.    2. Uncontrolled hypertension: BP is better now, changed Labetalol to 300mg three times a day and d/c HCTZ as well as linsopril today as his creatine got worse, he was given IV fluids, his creatinine came down, he was counselled to drink plenty of water, US doppler of the kidney is negative, TTE done showed LVH, will continue to monitor BP, social workers consulted for insurance issues and unable to buy meds, in am his BP was ok, then later on after arguments with family BP went really high, he was given Norvasc 10mg along with some ativan.      Hx of smoking: Counseled to abstain from smoking and marijuana use, counselled at length will provide nicotine patches.      DVT prophylaxis: Ambulate, Lovenox sc

## 2019-03-15 NOTE — DISCHARGE NOTE PROVIDER - NSDCFUADDINST_GEN_ALL_CORE_FT
Please call [facility]  clinic for appointment in 1 week, please ask the doctor to check your BMP. Please call [facility]  clinic for appointment in 1 week, please ask the doctor to check your BMP.  please ask your PMD to get your workup done for Secondry causes of Stan blood pressure.   No driving until cleared by Neurologist

## 2019-03-15 NOTE — DISCHARGE NOTE PROVIDER - CARE PROVIDER_API CALL
Drew Lugo; PhD)  Neurology; Vascular Neurology  [address], [address]  [place] [address]  Phone: [phone]  Fax: [phone]  Follow Up Time:     [facility],   [facility] in [place], [place]  Address: [address] [place] [address]  Phone: [phone]  Phone: (   )    -  Fax: (   )    -  Follow Up Time:

## 2019-03-15 NOTE — DISCHARGE NOTE NURSING/CASE MANAGEMENT/SOCIAL WORK - NSDCDPATPORTLINK_GEN_ALL_CORE
You can access the [facility] Patient Portal, offered by [facility], by registering with the following website: [email or web address]

## 2019-03-15 NOTE — DISCHARGE NOTE NURSING/CASE MANAGEMENT/SOCIAL WORK - NSDCPEEMAIL_GEN_ALL_CORE
[facility] email [email or web address]

## 2019-03-15 NOTE — PROGRESS NOTE ADULT - SUBJECTIVE AND OBJECTIVE BOX
[facility]                                     [facility] [place]                                 Brittney Shannon, & Earl                                  [address]                                        [place], [place], [address]                                             [phone]    CC: new onset seziure  HPI: The patient is a 28y Male who presented with possible seizure.  He reportedly had 5 minutes of jerking movments with emesis, no incontinence followed by diaphoresis and a 10 minute period of post-ictal confusion.  There is no tongue bite.  Hehad been smoking MJ prior to this, but is certain that it could not have been laced wiht anything as he knows the person who grows it.  He also has severe hypertension, does not medicate for this, and had an SBP in the 240's in the ED.  neuro is asked to eval.    Interval history: no new seizures    ROS neurology: Denies headache or dizziness. Denies weakness/numbness.  Denies speech/language deficits. Denies diplopia/blurred vision.  Denies confusion    MEDICATIONS  (STANDING):  enoxaparin Injectable 40 milliGRAM(s) SubCutaneous daily  labetalol 300 milliGRAM(s) Oral three times a day  levETIRAcetam  IVPB 500 milliGRAM(s) IV Intermittent every 12 hours  lisinopril 10 milliGRAM(s) Oral daily  nicotine - 21 mG/24Hr(s) Patch 1 patch Transdermal daily    MEDICATIONS  (PRN):  acetaminophen   Tablet .. 650 milliGRAM(s) Oral every 6 hours PRN Mild Pain (1 - 3)  metoprolol tartrate Injectable 5 milliGRAM(s) IV Push every 6 hours PRN sbp > 180 or dbp > 110  oxyCODONE    IR 5 milliGRAM(s) Oral every 6 hours PRN Severe Pain (7 - 10)      Vital Signs Last 24 Hrs  T(C): 37 (14 Mar 2019 16:37), Max: 37 (13 Mar 2019 17:37)  T(F): 98.6 (14 Mar 2019 16:37), Max: 98.6 (13 Mar 2019 17:37)  HR: 96 (14 Mar 2019 16:37) (85 - 96)  BP: 179/108 (14 Mar 2019 16:37) (134/77 - 179/108)  BP(mean): --  RR: 18 (14 Mar 2019 16:37) (16 - 18)  SpO2: 100% (14 Mar 2019 16:37) (94% - 100%)      Detailed Neurologic Exam:    Mental status: The patient is awake and alert and has normal attention span.  The patient is fully oriented in 3 spheres. The patient is oriented to current events. The patient is able to name objects, follow commands, repeat sentences.    Cranial nerves: Pupils equal and react symmetrically to light. There is no visual field deficit to confrontation. Extraocular motion is full with no nystagmus. There is no ptosis. Facial sensation is intact. Facial musculature is symmetric. Palate elevates symmetrically. Shoulder shrug is normal. Tongue is midline.    Motor: There is normal bulk and tone.  There is no tremor.  Strength is 5/5 in the right arm and leg.   Strength is 5/5 in the left arm and leg.    Sensation: Intact to light touch and pin in 4 extremities    Reflexes: 2+ throughout and plantar responses are flexor.    Cerebellar: There is no dysmetria on finger to nose testing.    Gait : deferred    LABS:                           14.0   8.0   )-----------( 221      ( 14 Mar 2019 06:19 )             38.6     03-14    139  |  99  |  21.0<H>  ----------------------------<  110  3.4<L>   |  25.0  |  1.28    Ca    9.1      14 Mar 2019 06:19  Phos  5.2     03-14  Mg     2.2     03-14      RADIOLOGY & ADDITIONAL STUDIES (independently reviewed unless otherwise noted):  MRI brain wwo shereen- no acute CVA, mass or blood, no abnormal enhancement    EEG no seizures
Patient is a 28y old  Male who presents with a chief complaint of Hypertensive Emergency (12 Mar 2019 20:33)      BRIEF HOSPITAL COURSE: 29 y/o AA Male 1/2 PPD smoker w/ PMHx uncontrolled HTN (can't afford meds), Right foot fracture on , presented with seizure while at home, witnessed by father who endorses 5 min of jerking movements in his chair, with vomiting and, no loss of bowel or bladder continence, followed by diaphoresis and 10 min of confusion. Pt admits smoking marijuana prior to event. Admits to 5/10 HA since the episode that has been slowly resolving.   In ED patient was alert and oriented, however was noted to have severely elevated BP (SBP in 240's) unresponsive to several doses of hydralazine and labetalol, eventually started on nicardipine infusion and admitted to ICU. Patient denies history of prior seizures.  Says he is aware of his HTN but doesn't have insurance so doesn't take meds or follow with any physicians. CT Chest negative for AAA, CTH negative.    Events last 24 hours: transitioned off cardene, on PO meds, no seizures     PAST MEDICAL & SURGICAL HISTORY:  HTN (hypertension)  No significant past surgical history        Medications:    hydrochlorothiazide 25 milliGRAM(s) Oral daily  labetalol 200 milliGRAM(s) Oral three times a day      acetaminophen   Tablet .. 650 milliGRAM(s) Oral every 6 hours PRN  levETIRAcetam  IVPB 500 milliGRAM(s) IV Intermittent every 12 hours  oxyCODONE    IR 5 milliGRAM(s) Oral every 6 hours PRN      enoxaparin Injectable 40 milliGRAM(s) SubCutaneous daily                        ICU Vital Signs Last 24 Hrs  T(C): 37.1 (13 Mar 2019 12:00), Max: 37.1 (13 Mar 2019 12:00)  T(F): 98.7 (13 Mar 2019 12:00), Max: 98.7 (13 Mar 2019 12:00)  HR: 87 (13 Mar 2019 13:00) (81 - 118)  BP: 165/95 (13 Mar 2019 13:00) (82/52 - 227/111)  BP(mean): 123 (13 Mar 2019 13:00) (62 - 131)  ABP: --  ABP(mean): --  RR: 29 (13 Mar 2019 13:00) (17 - 46)  SpO2: 99% (13 Mar 2019 13:00) (89% - 100%)          I&O's Detail    12 Mar 2019 07:01  -  13 Mar 2019 07:00  --------------------------------------------------------  IN:    niCARdipine Infusion: 75 mL    Sodium Chloride 0.9% IV Bolus: 1000 mL  Total IN: 1075 mL    OUT:    Voided: 3850 mL  Total OUT: 3850 mL    Total NET: -2775 mL      13 Mar 2019 07:01  -  13 Mar 2019 13:46  --------------------------------------------------------  IN:    Oral Fluid: 100 mL  Total IN: 100 mL    OUT:  Total OUT: 0 mL    Total NET: 100 mL            LABS:                        14.7   12.4  )-----------( 227      ( 13 Mar 2019 03:18 )             39.0     03-13    138  |  99  |  10.0  ----------------------------<  139<H>  3.5   |  24.0  |  0.85    Ca    9.1      13 Mar 2019 03:18  Phos  4.4     03-13  Mg     2.1     03-13    TPro  7.8  /  Alb  4.5  /  TBili  0.4  /  DBili  x   /  AST  19  /  ALT  23  /  AlkPhos  61  03-12      CARDIAC MARKERS ( 12 Mar 2019 21:06 )  x     / 0.04 ng/mL / x     / x     / x      CARDIAC MARKERS ( 12 Mar 2019 15:12 )  x     / 0.02 ng/mL / x     / x     / x          CAPILLARY BLOOD GLUCOSE          Urinalysis Basic - ( 13 Mar 2019 04:20 )    Color: Yellow / Appearance: Clear / S.005 / pH: x  Gluc: x / Ketone: Negative  / Bili: Negative / Urobili: Negative mg/dL   Blood: x / Protein: Negative mg/dL / Nitrite: Negative   Leuk Esterase: Small / RBC: 0-5 /HPF / WBC 3-5   Sq Epi: x / Non Sq Epi: Few / Bacteria: Few      CULTURES:      Physical Examination:    General: No acute distress.      HEENT: Pupils equal, reactive to light.  Symmetric.    PULM: Clear to auscultation bilaterally, no significant sputum production    NECK: Supple, no lymphadenopathy, trachea midline    CVS: Regular rate and rhythm, no murmurs, rubs, or gallops    GI: Soft, nondistended, nontender, normoactive bowel sounds, no masses    EXT: No edema, nontender- right foot bandaged.    SKIN: Warm and well perfused, no rashes noted.    NEURO: Alert, oriented, interactive, nonfocal    DEVICES:     RADIOLOGY:   < from: US Duplex Kidneys (19 @ 11:30) >  Findings:    Aorta:  cm/s    RIGHT KIDNEY:  Size : 12 cm, echotecture: normal , hydronephrosis: No  Origin renal artery: PSV  72 cm/s  Proximal renal artery: PSV  79 cm/s  Mid renal artery: PSV  90 cm/s  Distal renal artery: PSV  75 cm/s  Hilum: PSV  71 cm/s  RI: 0.48-0.55  RAR: 0.7  Renal vein: patent       LEFT KIDNEY:  Size : 15.5 cm, echotecture: multiple renal cysts, hydronephrosis: No  Origin renal artery: PSV  114 cm/s  Proximal renal artery: PSV  101 cm/s  Mid renal artery: PSV  58 cm/s  Distal renal artery: PSV  53 cm/s  Hilum: PSV  58 cm/s  RI: 0.44-0.66  RAR: 0.8  Renal vein: patent       Impression:    1. Right renal artery: No evidence of renal artery stenosis.  2. Left renal artery: No evidence of renal artery stenosis.  3. Patent bilateral renal veins                      CIPRIANO FAY M.D., ATTENDING RADIOLOGIST  This document has been electronically signed. Mar 13 2019 12:44PM        < end of copied text >  < from: MR Head w/wo IV Cont (19 @ 10:29) >    FINDINGS: Small right of midline posterior fossa arachnoid cyst on image   11, series 5 measures 1.8 x 1.1 x 0.9 cm. Small cava septum pellucidum   and cavum vergae are noted. No abnormal leptomeningeal or parenchymal   enhancement.    There is no abnormal restricted diffusion to suggest acute infarction.   Normal signal is demonstrated throughout the brain parenchyma. Normal T2   flow-voids are seen within  the intracranial vasculature. The lateral   ventricles and cortical sulci are otherwise age-appropriate in size and   configuration. There is no other mass, mass effect, or extra-axial fluid   collection. There is no susceptibility artifact to suggest hemorrhage.   Midline structures are normal.     Moderate polypoid inflammatory mucosal changes are seen throughout the   various portions of the paranasal sinuses. The orbits and mastoid air   cells are unremarkable.    IMPRESSION: No acute infarction. No abnormal enhancement.                  GEORGETTE MEJIA M.D., ATTENDING RADIOLOGIST  This document has been electronically signed. Mar 13 2019 11:53AM    < end of copied text >  < from: CT Angio Chest w/ IV Cont (19 @ 23:42) >    COMPARISON:    CR XR CHEST URGENT 3/12/2019 4:10 PM     FINDINGS:     VASCULATURE:    Aorta: No aneurysm or dissection.    Celiac trunk and mesenteric arteries: No occlusion or significant   stenosis.    Renal arteries: No occlusion or significant stenosis.    Right iliac arteries: No occlusion or significant stenosis.    Left iliac arteries: No occlusion or significant stenosis.     ABDOMEN:    Liver:  Unremarkable. Elongated right hepatic lobe. No significant focal   abnormalities..    Gallbladder and bile ducts:  Unremarkable.    Pancreas:  Unremarkable.    Spleen: Normal size.    Adrenals: No mass.   Kidneys and ureters:  7 mm left lower pole nonobstructing renal stone.   Numerous cysts measuring up to 4   cm in diameter replacing much of the left renal parenchyma. No   hydronephrosis.   Normal right kidney..    Stomach and bowel: No inflammationor obstruction.    Appendix:  Normal.     PELVIS:    Bladder: Unremarkable.     Reproductive: Unremarkable.     ABDOMEN and PELVIS:    Intraperitoneal space:  Unremarkable.    Bones/joints:  Lower lumbar degenerative disc disease. No compression   fractures.    Soft tissues: Unremarkable.    Lymph nodes: No adenopathy.     IMPRESSION:     1. No abdominal aortic aneurysm or dissection. No major branch vessel   occlusions.    2. Localized cystic disease of the left kidney.                ARDEN LOPZE M.D., ATTENDING RADIOLOGIST  This document has been electronically signed. Mar 13 2019  9:32AM        < end of copied text >    CRITICAL CARE TIME SPENT:
RENU VALLECILLO    [id number]    28y      Male    Patient is a 28y old  Male who presents with a chief complaint of Hypertensive Emergency (15 Mar 2019 15:53)      INTERVAL HPI/OVERNIGHT EVENTS:    Patient has no complains, as per him he has no headache, chest pain, SOB, dizziness, fever, chills, he looks anxious, as per nursing staff he was noted to have argument with his family on the phone screaming and Yelling.      REVIEW OF SYSTEMS:    CONSTITUTIONAL: No fever,no fatigue  RESPIRATORY: No cough,  No shortness of breath  CARDIOVASCULAR: No chest pain, palpitations  GASTROINTESTINAL: No abdominal, No nausea, vomiting  NEUROLOGICAL: No headaches,  loss of strength.  MISCELLANEOUS: No joint swelling or pain       Vital Signs Last 24 Hrs  T(C): 36.8 (15 Mar 2019 22:01), Max: 37 (15 Mar 2019 17:03)  T(F): 98.3 (15 Mar 2019 22:01), Max: 98.6 (15 Mar 2019 17:03)  HR: 81 (15 Mar 2019 22:01) (76 - 88)  BP: 166/118 (15 Mar 2019 22:01) (126/80 - 196/124)  BP(mean): --  RR: 16 (15 Mar 2019 22:01) (16 - 18)  SpO2: 96% (15 Mar 2019 22:01) (96% - 99%)    PHYSICAL EXAM:    GENERAL: Young age obese male looking Anxious  HEENT: PERRL, +EOMI  NECK: soft, Supple, No JVD,   CHEST/LUNG: Clear to auscultate bilaterally; No wheezing  HEART: S1S2+, Regular rate and rhythm; No murmurs  ABDOMEN: Soft, Nontender, Nondistended; Bowel sounds present  EXTREMITIES:  2+ Peripheral Pulses, No edema  SKIN: No rashes or lesions  NEURO: AAOX3, no focal deficits, no motor r sensory loss  PSYCH: Anxious mood        LABS:                        14.0   8.0   )-----------( 221      ( 14 Mar 2019 06:19 )             38.6     03-15    145  |  91<L>  |  27.0<H>  ----------------------------<  92  4.0   |  28.0  |  1.44<H>    Ca    9.0      15 Mar 2019 14:10  Phos  5.2     03-14  Mg     2.2     03-14              I&O's Summary    14 Mar 2019 07:01  -  15 Mar 2019 07:00  --------------------------------------------------------  IN: 470 mL / OUT: 1850 mL / NET: -1380 mL    15 Mar 2019 07:01  -  15 Mar 2019 22:51  --------------------------------------------------------  IN: 2700 mL / OUT: 0 mL / NET: 2700 mL        MEDICATIONS  (STANDING):  enoxaparin Injectable 40 milliGRAM(s) SubCutaneous daily  labetalol 300 milliGRAM(s) Oral three times a day  nicotine - 21 mG/24Hr(s) Patch 1 patch Transdermal daily    MEDICATIONS  (PRN):  acetaminophen   Tablet .. 650 milliGRAM(s) Oral every 6 hours PRN Mild Pain (1 - 3)  metoprolol tartrate Injectable 5 milliGRAM(s) IV Push every 6 hours PRN sbp > 180 or dbp > 110  oxyCODONE    IR 5 milliGRAM(s) Oral every 6 hours PRN Severe Pain (7 - 10)
RENU VLALECILLO    [id number]    28y      Male    Patient is a 28y old  Male who presents with a chief complaint of Hypertensive Emergency (14 Mar 2019 17:17)      INTERVAL HPI/OVERNIGHT EVENTS:    Patient is doing well, he has no headache, denies chest pain, nausea, vomiting, dizziness, fever, chills.     REVIEW OF SYSTEMS:    CONSTITUTIONAL: No fever, weight loss, or fatigue  RESPIRATORY: No cough, No shortness of breath  CARDIOVASCULAR: No chest pain, palpitations  GASTROINTESTINAL: No abdominal, No nausea, vomiting  NEUROLOGICAL: No headaches,  loss of strength.  MISCELLANEOUS: No joint swelling or pain       Vital Signs Last 24 Hrs  T(C): 37 (14 Mar 2019 16:37), Max: 37 (14 Mar 2019 16:37)  T(F): 98.6 (14 Mar 2019 16:37), Max: 98.6 (14 Mar 2019 16:37)  HR: 96 (14 Mar 2019 16:37) (86 - 96)  BP: 179/108 (14 Mar 2019 16:37) (134/77 - 179/108)  RR: 18 (14 Mar 2019 16:37) (18 - 18)  SpO2: 100% (14 Mar 2019 16:37) (95% - 100%)    PHYSICAL EXAM:    GENERAL: Young age obese male looking comfortable  HEENT: PERRL, +EOMI  NECK: soft, Supple, No JVD,   CHEST/LUNG: Clear to auscultate bilaterally; No wheezing  HEART: S1S2+, Regular rate and rhythm; No murmurs  ABDOMEN: Soft, Nontender, Nondistended; Bowel sounds present  EXTREMITIES:  2+ Peripheral Pulses, No edema  SKIN: No rashes or lesions  NEURO: AAOX3, no focal deficits, no motor r sensory loss  PSYCH: normal mood      LABS:                        14.0   8.0   )-----------( 221      ( 14 Mar 2019 06:19 )             38.6     03-14    139  |  99  |  21.0<H>  ----------------------------<  110  3.4<L>   |  25.0  |  1.28    Ca    9.1      14 Mar 2019 06:19  Phos  5.2     03-14  Mg     2.2     03-14        Urinalysis Basic - ( 13 Mar 2019 04:20 )    Color: Yellow / Appearance: Clear / S.005 / pH: x  Gluc: x / Ketone: Negative  / Bili: Negative / Urobili: Negative mg/dL   Blood: x / Protein: Negative mg/dL / Nitrite: Negative   Leuk Esterase: Small / RBC: 0-5 /HPF / WBC 3-5   Sq Epi: x / Non Sq Epi: Few / Bacteria: Few          I&O's Summary    13 Mar 2019 07:  -  14 Mar 2019 07:00  --------------------------------------------------------  IN: 1020 mL / OUT: 200 mL / NET: 820 mL    14 Mar 2019 07:  -  14 Mar 2019 18:10  --------------------------------------------------------  IN: 470 mL / OUT: 0 mL / NET: 470 mL        MEDICATIONS  (STANDING):  enoxaparin Injectable 40 milliGRAM(s) SubCutaneous daily  labetalol 300 milliGRAM(s) Oral three times a day  lisinopril 10 milliGRAM(s) Oral daily  nicotine - 21 mG/24Hr(s) Patch 1 patch Transdermal daily    MEDICATIONS  (PRN):  acetaminophen   Tablet .. 650 milliGRAM(s) Oral every 6 hours PRN Mild Pain (1 - 3)  metoprolol tartrate Injectable 5 milliGRAM(s) IV Push every 6 hours PRN sbp > 180 or dbp > 110  oxyCODONE    IR 5 milliGRAM(s) Oral every 6 hours PRN Severe Pain (7 - 10)
INTERVAL HPI/OVERNIGHT EVENTS:    ICU ACCEPTANCE NOTE:    CC: seizures, hypertensive urgency, marijuana abuse    Chart and course reviewed. No overnight events, denies headache, weakness. No chest pain, not on antihypertensives for >2 yrs due to insurance issues.       Vital Signs Last 24 Hrs  T(C): 37.1 (13 Mar 2019 12:00), Max: 37.1 (13 Mar 2019 12:00)  T(F): 98.7 (13 Mar 2019 12:00), Max: 98.7 (13 Mar 2019 12:00)  HR: 87 (13 Mar 2019 13:00) (81 - 118)  BP: 165/95 (13 Mar 2019 13:00) (82/52 - 227/111)  BP(mean): 123 (13 Mar 2019 13:00) (62 - 131)  RR: 29 (13 Mar 2019 13:00) (17 - 46)  SpO2: 99% (13 Mar 2019 13:00) (89% - 100%)    PHYSICAL EXAM:    GENERAL: Not in distress, alert, no weakness, oriented x 3  CHEST/LUNG: regular  HEART: Regular   ABDOMEN: Soft, BS+, non tender  EXTREMITIES: No edema, tenderness.    MEDICATIONS  (STANDING):  enoxaparin Injectable 40 milliGRAM(s) SubCutaneous daily  hydrochlorothiazide 25 milliGRAM(s) Oral daily  labetalol 200 milliGRAM(s) Oral three times a day  levETIRAcetam  IVPB 500 milliGRAM(s) IV Intermittent every 12 hours    MEDICATIONS  (PRN):  acetaminophen   Tablet .. 650 milliGRAM(s) Oral every 6 hours PRN Mild Pain (1 - 3)  oxyCODONE    IR 5 milliGRAM(s) Oral every 6 hours PRN Severe Pain (7 - 10)      Allergies    No Known Allergies    Intolerances          LABS:                          14.7   12.4  )-----------( 227      ( 13 Mar 2019 03:18 )             39.0     03-13    138  |  99  |  10.0  ----------------------------<  139<H>  3.5   |  24.0  |  0.85    Ca    9.1      13 Mar 2019 03:18  Phos  4.4     03-13  Mg     2.1     03-13    TPro  7.8  /  Alb  4.5  /  TBili  0.4  /  DBili  x   /  AST  19  /  ALT  23  /  AlkPhos  61  03-12      Urinalysis Basic - ( 13 Mar 2019 04:20 )    Color: Yellow / Appearance: Clear / S.005 / pH: x  Gluc: x / Ketone: Negative  / Bili: Negative / Urobili: Negative mg/dL   Blood: x / Protein: Negative mg/dL / Nitrite: Negative   Leuk Esterase: Small / RBC: 0-5 /HPF / WBC 3-5   Sq Epi: x / Non Sq Epi: Few / Bacteria: Few        RADIOLOGY & ADDITIONAL TESTS:

## 2019-03-15 NOTE — DISCHARGE NOTE PROVIDER - NSDCCPCAREPLAN_GEN_ALL_CORE_FT
PRINCIPAL DISCHARGE DIAGNOSIS  Diagnosis: Hypertensive emergency  Assessment and Plan of Treatment: due to noncompliance, cannot exclude CONSUELO, Pheo      SECONDARY DISCHARGE DIAGNOSES  Diagnosis: Seizure  Assessment and Plan of Treatment: No driving until cleared by Neurologist

## 2019-03-15 NOTE — DISCHARGE NOTE PROVIDER - HOSPITAL COURSE
28 yr old male with hypertension and marijuana abuse admitted with complaints of witnessed seizures. He was noted to have shaking movements shortly after using Marijuana.     In ED, his systolic BP was 190, which initially was treated with IV antihypertensives but continued to be uncontrolled, elevated > 200 with complaints of headache.     ICU was consulted for hypertensive emergency, nicardipine gtt was initiated and he was transferred for further management. Patient was not on antihypertensives at home.     Labetalol and HCTZ were initiated. Keppra was given for seizures. CT head on admission was negative for stroke. He was subsequently weaned off Nicardipine gtt.     Seizures attributed to possible PRES, hence MRI brain and EEG was ordered.     CTA chest and abdomen was done as he complained of intermittent abdominal pain, no evidence of dissection, no pulmonary embolism and no renal artery stenosis.     MRI brain did not show acute infarction.    patient was transferred under medicine and was continue with Seizure precaution, EEG done, no seizure like activity  Neurology to evaluate and recommended no seizure meds as it was first time seizure,     no driving utill cleared, Keppra was discontinued need to follow up with Drew Lugo) in the office in couple of weeks, he has no seizures over the course.      his BP meds were continued initially, he was given Labetalol to 300mg three times a day and d/angel HCTZ as patient as BMP showed elevation in creatinine and was continued with     Lisinopril, but creatinine was worsening, so gave him IV  fluids and d/angel his lisinorpil and repeated his creatine trending donw, he was told to drink plenty of water,     he will be getting Labetalol 300mg three time a day and Norvasc 5mg daily, he was told to check his BP daily and see PMD in 1 week with all reading of his BP. US doppler of the kidney is negative, TTE done showed LVH, will continue to monitor BP, social workers consulted for insurance issues and unable to buy meds.     Acute renal failure medication induced, it started after strating patient on HCTZ and Lisinopril both discontinued, gave IV fluids, improving now, counselled to drink plenty of fluids     and get his BMP checked in 1 week, he was counselled about the  low salt diet as well, He has Hx of Hx of smoking, Counseled to abstain from smoking and marijuana use,     counselled at length, provide nicotine patches,  saw the patient and gave info about Smoking cessation program.     patient is doing well, he is being discharged home in a stable condition.         Vital Signs Last 24 Hrs    T(C): 36.8 (15 Mar 2019 10:00), Max: 37 (14 Mar 2019 16:37)    T(F): 98.3 (15 Mar 2019 10:00), Max: 98.6 (14 Mar 2019 16:37)    HR: 80 (15 Mar 2019 10:00) (76 - 96)    BP: 144/91 (15 Mar 2019 10:00) (126/80 - 182/120)    RR: 18 (15 Mar 2019 10:00) (18 - 18)    SpO2: 97% (15 Mar 2019 05:08) (97% - 100%)        PHYSICAL EXAM:    GENERAL: Young age obese male looking comfortable    HEENT: PERRL, +EOMI    NECK: soft, Supple, No JVD,     CHEST/LUNG: Clear to auscultate bilaterally; No wheezing    HEART: S1S2+, Regular rate and rhythm; No murmurs    ABDOMEN: Soft, Nontender, Nondistended; Bowel sounds present    EXTREMITIES:  2+ Peripheral Pulses, No edema    SKIN: No rashes or lesions    NEURO: AAOX3, no focal deficits, no motor r sensory loss    PSYCH: normal mood        Total time spent 40 minutes 28 yr old male with hypertension and marijuana abuse admitted with complaints of witnessed seizures. He was noted to have shaking movements shortly after using Marijuana.     In ED, his systolic BP was 190, which initially was treated with IV antihypertensives but continued to be uncontrolled, elevated > 200 with complaints of headache.     ICU was consulted for hypertensive emergency, nicardipine gtt was initiated and he was transferred for further management. Patient was not on antihypertensives at home.     Labetalol and HCTZ were initiated. Keppra was given for seizures. CT head on admission was negative for stroke. He was subsequently weaned off Nicardipine gtt.     Seizures attributed to possible PRES, hence MRI brain and EEG was ordered.     CTA chest and abdomen was done as he complained of intermittent abdominal pain, no evidence of dissection, no pulmonary embolism and no renal artery stenosis.     MRI brain did not show acute infarction.    patient was transferred under medicine and was continue with Seizure precaution, EEG done, no seizure like activity  Neurology to evaluate and recommended no seizure meds as it was first time seizure,     no driving utill cleared, Keppra was discontinued need to follow up with Drew Lugo) in the office in couple of weeks, he has no seizures over the course.      his BP meds were continued initially, he was given Labetalol to 300mg three times a day and d/angel HCTZ as patient as BMP showed elevation in creatinine and was continued with     Lisinopril, but creatinine was worsening, so gave him IV  fluids and d/angel his lisinorpil and repeated his creatine trending donw, he was told to drink plenty of water,     he will be getting Labetalol 300mg three time a day and Norvasc 10mg daily, he was told to check his BP daily and see PMD in 1 week with all reading of his BP. US doppler of the kidney is negative, TTE done showed LVH, will continue to monitor BP, social workers consulted for insurance issues and unable to buy meds.     Patient was noted to have elevated BP after arguments with family, as he was looking anxious, he was given his BP meds and given dose of ativan, this morning BP is 160/90, he denies any headache, he is anxious to go home, he was told to check his BP at home and counselled about the medication compliance, low salt diet.      Acute renal failure medication induced, it started after strating patient on HCTZ and Lisinopril both discontinued, gave IV fluids, improving now, counselled to drink plenty of fluids     and get his BMP checked in 1 week, he was counselled about the  low salt diet as well, He has Hx of Hx of smoking, Counseled to abstain from smoking and marijuana use,     counselled at length, provide nicotine patches,  saw the patient and gave info about Smoking cessation program.     patient is doing well, he is being discharged home in a stable condition.         Vital Signs Last 24 Hrs    T(C): 36.6 (16 Mar 2019 05:47), Max: 37 (15 Mar 2019 17:03)    T(F): 97.8 (16 Mar 2019 05:47), Max: 98.6 (15 Mar 2019 17:03)    HR: 74 (16 Mar 2019 05:47) (74 - 88)    BP: 160/90 (16 Mar 2019 05:47) (158/93 - 196/124)    RR: 20 (16 Mar 2019 05:47) (16 - 20)    SpO2: 96% (16 Mar 2019 05:47) (96% - 99%)        PHYSICAL EXAM:    GENERAL: Young age obese male looking comfortable    HEENT: PERRL, +EOMI    NECK: soft, Supple, No JVD,     CHEST/LUNG: Clear to auscultate bilaterally; No wheezing    HEART: S1S2+, Regular rate and rhythm; No murmurs    ABDOMEN: Soft, Nontender, Nondistended; Bowel sounds present    EXTREMITIES:  2+ Peripheral Pulses, No edema    SKIN: No rashes or lesions    NEURO: AAOX3, no focal deficits, no motor r sensory loss    PSYCH: normal mood        Total time spent 40 minutes 28 yr old male with hypertension and marijuana abuse admitted with complaints of witnessed seizures. He was noted to have shaking movements shortly after using Marijuana.     In ED, his systolic BP was 190, which initially was treated with IV antihypertensives but continued to be uncontrolled, elevated > 200 with complaints of headache.     ICU was consulted for hypertensive emergency, nicardipine gtt was initiated and he was transferred for further management. Patient was not on antihypertensives at home.     Labetalol and HCTZ were initiated. Keppra was given for seizures. CT head on admission was negative for stroke. He was subsequently weaned off Nicardipine gtt.     Seizures attributed to possible PRES, hence MRI brain and EEG was ordered.     CTA chest and abdomen was done as he complained of intermittent abdominal pain, no evidence of dissection, no pulmonary embolism and no renal artery stenosis.     MRI brain did not show acute infarction.    patient was transferred under medicine and was continue with Seizure precaution, EEG done, no seizure like activity  Neurology to evaluate and recommended no seizure meds as it was first time seizure,     no driving utill cleared, Keppra was discontinued need to follow up with Drew Lugo) in the office in couple of weeks, he has no seizures over the course.      his BP meds were continued initially, he was given Labetalol to 300mg three times a day and d/angel HCTZ as patient as BMP showed elevation in creatinine and was continued with     Lisinopril, but creatinine was worsening, so gave him IV  fluids and d/angel his lisinorpil and repeated his creatine trending donw, he was told to drink plenty of water,     he will be getting Labetalol 300mg three time a day and Norvasc 10mg daily, he was told to check his BP daily and see PMD in 1 week with all reading of his BP. US doppler of the kidney is negative, TTE done showed LVH, will continue to monitor BP, social workers consulted for insurance issues and unable to buy meds.     Patient was noted to have elevated BP after arguments with family, as he was looking anxious, he was given his BP meds and given dose of ativan, this morning BP is 160/90, hydralizine is added to his regimen, he denies any headache, he is anxious to go home, he was told to check his BP at home and counselled about the medication compliance, low salt diet.      Acute renal failure medication induced, it started after strating patient on HCTZ and Lisinopril both discontinued, gave IV fluids, improving now, counselled to drink plenty of fluids     and get his BMP checked in 1 week, he was counselled about the  low salt diet as well, He has Hx of Hx of smoking, Counseled to abstain from smoking and marijuana use,     counselled at length, provide nicotine patches,  saw the patient and gave info about Smoking cessation program.     patient is doing well, he is being discharged home in a stable condition.         Vital Signs Last 24 Hrs    T(C): 36.6 (16 Mar 2019 05:47), Max: 37 (15 Mar 2019 17:03)    T(F): 97.8 (16 Mar 2019 05:47), Max: 98.6 (15 Mar 2019 17:03)    HR: 74 (16 Mar 2019 05:47) (74 - 88)    BP: 160/90 (16 Mar 2019 05:47) (158/93 - 196/124)    RR: 20 (16 Mar 2019 05:47) (16 - 20)    SpO2: 96% (16 Mar 2019 05:47) (96% - 99%)        PHYSICAL EXAM:    GENERAL: Young age obese male looking comfortable    HEENT: PERRL, +EOMI    NECK: soft, Supple, No JVD,     CHEST/LUNG: Clear to auscultate bilaterally; No wheezing    HEART: S1S2+, Regular rate and rhythm; No murmurs    ABDOMEN: Soft, Nontender, Nondistended; Bowel sounds present    EXTREMITIES:  2+ Peripheral Pulses, No edema    SKIN: No rashes or lesions    NEURO: AAOX3, no focal deficits, no motor r sensory loss    PSYCH: normal mood        Total time spent 40 minutes

## 2019-03-15 NOTE — DISCHARGE NOTE PROVIDER - PROVIDER TOKENS
PROVIDER:[TOKEN:[[id number]]],FREE:[LAST:[HR],PHONE:[(   )    -],FAX:[(   )    -],ADDRESS:[[facility] in [place], [place]  Address: [address], [place] [address]  Phone: [phone]]]

## 2019-03-15 NOTE — PROGRESS NOTE ADULT - REASON FOR ADMISSION
Hypertensive Emergency

## 2019-03-15 NOTE — DISCHARGE NOTE NURSING/CASE MANAGEMENT/SOCIAL WORK - NSDCPEWEB_GEN_ALL_CORE
[place] website --- [email or web address] website --- [email or web address]

## 2019-03-16 VITALS
RESPIRATION RATE: 18 BRPM | OXYGEN SATURATION: 97 % | TEMPERATURE: 98 F | HEART RATE: 82 BPM | DIASTOLIC BLOOD PRESSURE: 87 MMHG | SYSTOLIC BLOOD PRESSURE: 158 MMHG

## 2019-03-16 LAB
ANION GAP SERPL CALC-SCNC: 13 MMOL/L — SIGNIFICANT CHANGE UP (ref 5–17)
BUN SERPL-MCNC: 19 MG/DL — SIGNIFICANT CHANGE UP (ref 8–20)
CALCIUM SERPL-MCNC: 9.2 MG/DL — SIGNIFICANT CHANGE UP (ref 8.6–10.2)
CHLORIDE SERPL-SCNC: 102 MMOL/L — SIGNIFICANT CHANGE UP (ref 98–107)
CO2 SERPL-SCNC: 26 MMOL/L — SIGNIFICANT CHANGE UP (ref 22–29)
CREAT SERPL-MCNC: 1.28 MG/DL — SIGNIFICANT CHANGE UP (ref 0.5–1.3)
GLUCOSE SERPL-MCNC: 96 MG/DL — SIGNIFICANT CHANGE UP (ref 70–115)
POTASSIUM SERPL-MCNC: 3.7 MMOL/L — SIGNIFICANT CHANGE UP (ref 3.5–5.3)
POTASSIUM SERPL-SCNC: 3.7 MMOL/L — SIGNIFICANT CHANGE UP (ref 3.5–5.3)
SODIUM SERPL-SCNC: 141 MMOL/L — SIGNIFICANT CHANGE UP (ref 135–145)

## 2019-03-16 PROCEDURE — 83835 ASSAY OF METANEPHRINES: CPT

## 2019-03-16 PROCEDURE — 96361 HYDRATE IV INFUSION ADD-ON: CPT

## 2019-03-16 PROCEDURE — 84100 ASSAY OF PHOSPHORUS: CPT

## 2019-03-16 PROCEDURE — 71275 CT ANGIOGRAPHY CHEST: CPT

## 2019-03-16 PROCEDURE — 83735 ASSAY OF MAGNESIUM: CPT

## 2019-03-16 PROCEDURE — 93005 ELECTROCARDIOGRAM TRACING: CPT

## 2019-03-16 PROCEDURE — 96375 TX/PRO/DX INJ NEW DRUG ADDON: CPT

## 2019-03-16 PROCEDURE — 84443 ASSAY THYROID STIM HORMONE: CPT

## 2019-03-16 PROCEDURE — 84484 ASSAY OF TROPONIN QUANT: CPT

## 2019-03-16 PROCEDURE — 74174 CTA ABD&PLVS W/CONTRAST: CPT

## 2019-03-16 PROCEDURE — 80048 BASIC METABOLIC PNL TOTAL CA: CPT

## 2019-03-16 PROCEDURE — 95819 EEG AWAKE AND ASLEEP: CPT

## 2019-03-16 PROCEDURE — 82384 ASSAY THREE CATECHOLAMINES: CPT

## 2019-03-16 PROCEDURE — 80053 COMPREHEN METABOLIC PANEL: CPT

## 2019-03-16 PROCEDURE — 97163 PT EVAL HIGH COMPLEX 45 MIN: CPT

## 2019-03-16 PROCEDURE — 84585 ASSAY OF URINE VMA: CPT

## 2019-03-16 PROCEDURE — 96374 THER/PROPH/DIAG INJ IV PUSH: CPT

## 2019-03-16 PROCEDURE — 81001 URINALYSIS AUTO W/SCOPE: CPT

## 2019-03-16 PROCEDURE — 70450 CT HEAD/BRAIN W/O DYE: CPT

## 2019-03-16 PROCEDURE — 85027 COMPLETE CBC AUTOMATED: CPT

## 2019-03-16 PROCEDURE — 36415 COLL VENOUS BLD VENIPUNCTURE: CPT

## 2019-03-16 PROCEDURE — 82570 ASSAY OF URINE CREATININE: CPT

## 2019-03-16 PROCEDURE — 80307 DRUG TEST PRSMV CHEM ANLYZR: CPT

## 2019-03-16 PROCEDURE — 70553 MRI BRAIN STEM W/O & W/DYE: CPT

## 2019-03-16 PROCEDURE — 93306 TTE W/DOPPLER COMPLETE: CPT

## 2019-03-16 PROCEDURE — 96376 TX/PRO/DX INJ SAME DRUG ADON: CPT

## 2019-03-16 PROCEDURE — 99291 CRITICAL CARE FIRST HOUR: CPT | Mod: 25

## 2019-03-16 PROCEDURE — 71045 X-RAY EXAM CHEST 1 VIEW: CPT

## 2019-03-16 PROCEDURE — 93975 VASCULAR STUDY: CPT

## 2019-03-16 PROCEDURE — 83497 ASSAY OF 5-HIAA: CPT

## 2019-03-16 PROCEDURE — 99239 HOSP IP/OBS DSCHRG MGMT >30: CPT

## 2019-03-16 RX ORDER — HYDRALAZINE HCL 50 MG
25 TABLET ORAL EVERY 8 HOURS
Qty: 0 | Refills: 0 | Status: DISCONTINUED | OUTPATIENT
Start: 2019-03-16 | End: 2019-03-16

## 2019-03-16 RX ORDER — HYDRALAZINE HCL 50 MG
1 TABLET ORAL
Qty: 90 | Refills: 2
Start: 2019-03-16 | End: 2019-06-13

## 2019-03-16 RX ADMIN — ENOXAPARIN SODIUM 40 MILLIGRAM(S): 100 INJECTION SUBCUTANEOUS at 11:56

## 2019-03-16 RX ADMIN — AMLODIPINE BESYLATE 10 MILLIGRAM(S): 2.5 TABLET ORAL at 05:26

## 2019-03-16 RX ADMIN — Medication 25 MILLIGRAM(S): at 10:43

## 2019-03-16 RX ADMIN — Medication 300 MILLIGRAM(S): at 05:26

## 2019-03-16 RX ADMIN — Medication 300 MILLIGRAM(S): at 12:53

## 2019-03-19 LAB
5OH-INDOLEACETATE UR-MCNC: 1100 ML — SIGNIFICANT CHANGE UP
5OH-INDOLEACETATE UR-MCNC: 2.5 G/24 H — HIGH (ref 0.5–2.15)
5OH-INDOLEACETATE UR-MCNC: 5.9 MG/24 H — SIGNIFICANT CHANGE UP

## 2019-03-20 LAB
CREAT ?TM UR-MCNC: 222 MG/DL — SIGNIFICANT CHANGE UP (ref 20–320)
DOPAMINE UR-MCNC: 219 MCG/G CR — SIGNIFICANT CHANGE UP (ref 40–390)
EPINEPH UR-MCNC: 4 MCG/G CR — SIGNIFICANT CHANGE UP (ref 2–16)
EPINEPHRINE/NOREPINEPHRINE - RANDOM URINE: 44 MCG/G CR — SIGNIFICANT CHANGE UP (ref 9–74)
METANEPHRINE, PL: 29 PG/ML — SIGNIFICANT CHANGE UP (ref 0–62)
NOREPINEPH 24H UR-MCNC: 40 MCG/G CR — SIGNIFICANT CHANGE UP (ref 7–65)
NORMETANEPHRINE, PL: 160 PG/ML — HIGH (ref 0–145)

## 2019-03-21 LAB
COLLECT DURATION TIME UR: 24 H — SIGNIFICANT CHANGE UP
METANEPH 24H UR-MRATE: 393 MCG/24 H — SIGNIFICANT CHANGE UP
METANEPH UR-MCNC: 2025 ML — SIGNIFICANT CHANGE UP
METANEPH UR-MCNC: 24 H — SIGNIFICANT CHANGE UP
METANEPH UR-MCNC: 393 MCG/24 H — SIGNIFICANT CHANGE UP
METANEPHS 24H UR-MRATE: 1649 MCG/24 H — HIGH
METANEPHS 24H UR-MRATE: 2025 ML — SIGNIFICANT CHANGE UP
METANEPHS UR-MCNC: 1649 MCG/24 H — HIGH
NORMETANEPHRINE 24H UR-MRATE: 1256 MCG/24 H — HIGH
NORMETANEPHRINE UR-SCNC: 1256 MCG/24 H — HIGH
SPECIMEN VOL 24H UR: 1649 MCG/24 H — HIGH
SPECIMEN VOL 24H UR: 2025 ML — SIGNIFICANT CHANGE UP
VMA SERPL-MCNC: 24 H — SIGNIFICANT CHANGE UP
VMA SERPL-MCNC: 6.9 MG/24 H — SIGNIFICANT CHANGE UP
VMA UR-MCNC: 2025 ML — SIGNIFICANT CHANGE UP

## 2019-03-22 NOTE — ED PROVIDER NOTE - ATTENDING CONTRIBUTION TO CARE
Stroke warning signs and symptoms/Signs and symptoms of stroke/Prescribed medications/Stroke education booklet/Call 911 for stroke/Need for follow up after discharge/Risk factors for stroke/Stroke support groups for patients, families, and friends
I, Marietta Neves, performed a face to face bedside interview with this patient regarding history of present illness, review of symptoms and relevant past medical, social and family history.  I completed an independent physical examination. Medical decision making, follow-up on ordered tests (ie labs, radiologic studies) and re-evaluation of the patient's status has been communicated to the Resident. Disposition of the patient will be based on test outcome and response to ED interventions.     Seen bedside in critical care room. patient without any complaints at this time. PMH- HTN not on meds due to insurance issues, today smoked marijuana shortly after had seizure witnessed by dad as 'fish out of water' no incontinence/tongue biting. no HA. no focal weakness. had period of confusion for 10mins. no recent infectious symptoms. has foot in boot due to injury. no SOB. no hypoxia.

## 2019-03-25 NOTE — PROVIDER CONTACT NOTE (EICU) - ASSESSMENT
Admission Reconciliation is Completed  Discharge Reconciliation is Completed
potassium of 3.5, BUN/Cr 10/0.85

## 2019-04-05 RX ORDER — NICOTINE POLACRILEX 2 MG
1 GUM BUCCAL
Qty: 21 | Refills: 0
Start: 2019-04-05 | End: 2019-04-25

## 2019-04-26 RX ORDER — NICOTINE POLACRILEX 2 MG
1 GUM BUCCAL
Qty: 21 | Refills: 0
Start: 2019-04-26 | End: 2019-05-16

## 2019-07-29 ENCOUNTER — INPATIENT (INPATIENT)
Facility: HOSPITAL | Age: 29
LOS: 10 days | Discharge: HOME | DRG: 644 | End: 2019-08-09
Attending: SURGERY | Admitting: INTERNAL MEDICINE
Payer: COMMERCIAL

## 2019-07-29 VITALS
HEART RATE: 86 BPM | OXYGEN SATURATION: 100 % | TEMPERATURE: 99 F | RESPIRATION RATE: 20 BRPM | WEIGHT: 300.05 LBS | HEIGHT: 71 IN | SYSTOLIC BLOOD PRESSURE: 180 MMHG | DIASTOLIC BLOOD PRESSURE: 112 MMHG

## 2019-07-29 LAB
ANION GAP SERPL CALC-SCNC: 8 MMOL/L — SIGNIFICANT CHANGE UP (ref 5–17)
BUN SERPL-MCNC: 13 MG/DL — SIGNIFICANT CHANGE UP (ref 8–20)
CALCIUM SERPL-MCNC: 9.7 MG/DL — SIGNIFICANT CHANGE UP (ref 8.6–10.2)
CHLORIDE SERPL-SCNC: 106 MMOL/L — SIGNIFICANT CHANGE UP (ref 98–107)
CO2 SERPL-SCNC: 28 MMOL/L — SIGNIFICANT CHANGE UP (ref 22–29)
CREAT SERPL-MCNC: 1.09 MG/DL — SIGNIFICANT CHANGE UP (ref 0.5–1.3)
GLUCOSE SERPL-MCNC: 108 MG/DL — SIGNIFICANT CHANGE UP (ref 70–115)
HCT VFR BLD CALC: 39.1 % — SIGNIFICANT CHANGE UP (ref 39–50)
HGB BLD-MCNC: 14.2 G/DL — SIGNIFICANT CHANGE UP (ref 13–17)
MCHC RBC-ENTMCNC: 31.1 PG — SIGNIFICANT CHANGE UP (ref 27–34)
MCHC RBC-ENTMCNC: 36.3 GM/DL — HIGH (ref 32–36)
MCV RBC AUTO: 85.7 FL — SIGNIFICANT CHANGE UP (ref 80–100)
PLATELET # BLD AUTO: 248 K/UL — SIGNIFICANT CHANGE UP (ref 150–400)
POTASSIUM SERPL-MCNC: 4.1 MMOL/L — SIGNIFICANT CHANGE UP (ref 3.5–5.3)
POTASSIUM SERPL-SCNC: 4.1 MMOL/L — SIGNIFICANT CHANGE UP (ref 3.5–5.3)
RBC # BLD: 4.56 M/UL — SIGNIFICANT CHANGE UP (ref 4.2–5.8)
RBC # FLD: 11.9 % — SIGNIFICANT CHANGE UP (ref 10.3–14.5)
SODIUM SERPL-SCNC: 142 MMOL/L — SIGNIFICANT CHANGE UP (ref 135–145)
WBC # BLD: 8.97 K/UL — SIGNIFICANT CHANGE UP (ref 3.8–10.5)
WBC # FLD AUTO: 8.97 K/UL — SIGNIFICANT CHANGE UP (ref 3.8–10.5)

## 2019-07-29 PROCEDURE — 70450 CT HEAD/BRAIN W/O DYE: CPT | Mod: 26

## 2019-07-29 PROCEDURE — 99285 EMERGENCY DEPT VISIT HI MDM: CPT

## 2019-07-29 RX ORDER — ACETAMINOPHEN 500 MG
975 TABLET ORAL ONCE
Refills: 0 | Status: COMPLETED | OUTPATIENT
Start: 2019-07-29 | End: 2019-07-29

## 2019-07-29 RX ADMIN — Medication 975 MILLIGRAM(S): at 20:31

## 2019-07-29 RX ADMIN — Medication 0.1 MILLIGRAM(S): at 20:31

## 2019-07-29 NOTE — ED STATDOCS - NS ED ROS FT
ROS: no CP/SOB. no cough. no fever. no n/v/d/c. no abd pain. no rash. no bleeding. +urinary frequency. no weakness. no vision changes. + HA. no neck/back pain. no extremity swelling/deformity. No change in mental status. +tingling to left 2-4 digits.

## 2019-07-29 NOTE — ED STATDOCS - CLINICAL SUMMARY MEDICAL DECISION MAKING FREE TEXT BOX
Pt with HTN c/o a HA, paresthesias to the left 2-4 digits. Neuro in tact no sensory deficits, likely peripheral, BP noted high, Will check labs, CT head, provide clonidine and re-asses. Pt with HTN c/o a HA, paresthesias to the left 2-4 digits. Neuro intact no sensory deficits, likely peripheral, BP noted high, Will check labs, CT head, provide clonidine and re-asses.

## 2019-07-29 NOTE — ED ADULT NURSE NOTE - OBJECTIVE STATEMENT
Assumed care of patient at 2030, alert and oriented x4, resting comfortably in bed. Pt stated " I came here today because both my hands are tingling and my blood pressure" Pt reports tingling to both hands started last night. Pt c/o 3/10 headache. Denies blurry vision, denies chest pains. Medications given per MD order, pt resting comfortably in chair, no s/s of distress noted. Awaiting CT scans, patient is neurologically intact. Safety maintained.

## 2019-07-29 NOTE — ED STATDOCS - ATTENDING CONTRIBUTION TO CARE
I, Marietta Neves, performed the initial face to face bedside interview with this patient regarding history of present illness, review of symptoms and relevant past medical, social and family history.  I completed an independent physical examination.   The medical decision making and follow-up on ordered tests (ie labs, radiologic studies) and re-evaluation of the patient's status has been communicated to the ACP.  Disposition of the patient will be based on test outcome and response to ED interventions.  The history, relevant review of systems, past medical and surgical history, medical decision making, and physical examination was documented by the scribe in my presence and I attest to the accuracy of the documentation.

## 2019-07-29 NOTE — ED ADULT NURSE NOTE - CHPI ED NUR SYMPTOMS NEG
no nausea/no vomiting/no chest pain/no dizziness/no syncope/no shortness of breath/no fever/no back pain/no congestion/no chills/no diaphoresis

## 2019-07-29 NOTE — ED STATDOCS - OBJECTIVE STATEMENT
28 y/o M pt with significant PMHx of HTN presents to the ED c/o constant tingling to his left 2-4 digits that began last night, as well as a HA that began this morning and urinary frequency. Currently on Labetalol, Hydroxyline and water pills. Pt has not visited a PMD in a extended period of time. Former Tobacco User.  Denies missing any medication, weakness, vision changes, SOB, CP, tingling in his arms, trouble ambulating. No further complaints at this time. 30 y/o M pt with significant PMHx of HTN presents to the ED c/o constant tingling to his left 2-4 digits that began last night, as well as a HA that began this morning and urinary frequency. Currently on Labetalol, hydralazine and water pills. Pt has not visited a PMD in a extended period of time. Former Tobacco User.  Denies missing any medication, weakness, vision changes, SOB, CP, tingling in his arms, trouble ambulating. No further complaints at this time.

## 2019-07-29 NOTE — ED STATDOCS - PHYSICAL EXAMINATION
Gen: NAD, AOx3  Head: NCAT  HEENT: PERRL, EOMI, oral mucosa moist, normal conjunctiva, neck supple  Lung: CTAB, no respiratory distress  CV: rrr, no murmur, Normal perfusion  Abd: soft, NTND, no CVA tenderness  MSK: No edema, no visible deformities.   Neuro: CN II-XII intact, 5/5 global strength, sensation intact, no dysmetria/ataxia, gait intact, No sensory changes to the digits.  Skin: No rash   Psych: normal affect Gen: NAD, AOx3  Head: NCAT  HEENT: PERRL, EOMI, oral mucosa moist, normal conjunctiva, neck supple  Lung: CTAB, no respiratory distress  CV: rrr, no murmur, Normal perfusion  Abd: soft, NTND  MSK: No edema, no visible deformities.   Neuro: CN II-XII intact, 5/5 global strength, sensation intact, no dysmetria/ataxia, gait intact, No sensory changes to the digits.  Skin: No rash   Psych: normal affect

## 2019-07-29 NOTE — ED ADULT TRIAGE NOTE - CHIEF COMPLAINT QUOTE
sent from urgent care with HTN, has a history and on medications. went there for tingling in fingers, which started last night. No medication given by urgent care. headache also

## 2019-07-29 NOTE — ED ADULT NURSE NOTE - NSIMPLEMENTINTERV_GEN_ALL_ED
Implemented All Universal Safety Interventions:  [place] to call system. Call bell, personal items and telephone within reach. Instruct patient to call for assistance. Room bathroom lighting operational. Non-slip footwear when patient is off stretcher. Physically safe environment: no spills, clutter or unnecessary equipment. Stretcher in lowest position, wheels locked, appropriate side rails in place.

## 2019-07-29 NOTE — ED STATDOCS - PROGRESS NOTE DETAILS
PT evaluated by intake physician. HPI/PE/ROS as noted above. Will follow up plan per intake physician. reviewed ct head, lab work, pt informed to follow up with pmd, pt explained d/c instructions HA resolved, still states intermittent tingling in left hand digits 2-4, no weakness in  strength and no sensory deficits. BP improved but still elevated, will give dose of hydralazine. patient needs outpt PCP and neuro Follow up. no exam findings concerning for stroke or HTN urgency, most likely peripheral neuropathy -Pura DO PT BP still elevated despite hydralazine, will put in iv labetalol, ekg, cardiac montior reassess BP still elevated - po labetalol, IV hydralazine, will put in observation to be seen by cardiology in am for adjustment to BP medication, looked through previous visits, pt with echo in the past, ultrasound renal

## 2019-07-30 DIAGNOSIS — Z29.9 ENCOUNTER FOR PROPHYLACTIC MEASURES, UNSPECIFIED: ICD-10-CM

## 2019-07-30 DIAGNOSIS — E27.9 DISORDER OF ADRENAL GLAND, UNSPECIFIED: ICD-10-CM

## 2019-07-30 DIAGNOSIS — I15.9 SECONDARY HYPERTENSION, UNSPECIFIED: ICD-10-CM

## 2019-07-30 LAB
APPEARANCE UR: CLEAR — SIGNIFICANT CHANGE UP
BACTERIA # UR AUTO: ABNORMAL
BILIRUB UR-MCNC: NEGATIVE — SIGNIFICANT CHANGE UP
COLOR SPEC: YELLOW — SIGNIFICANT CHANGE UP
COMMENT - URINE: SIGNIFICANT CHANGE UP
DIFF PNL FLD: ABNORMAL
EPI CELLS # UR: ABNORMAL
GLUCOSE UR QL: NEGATIVE MG/DL — SIGNIFICANT CHANGE UP
KETONES UR-MCNC: NEGATIVE — SIGNIFICANT CHANGE UP
LEUKOCYTE ESTERASE UR-ACNC: ABNORMAL
NITRITE UR-MCNC: POSITIVE
PH UR: 7 — SIGNIFICANT CHANGE UP (ref 5–8)
PROT UR-MCNC: NEGATIVE MG/DL — SIGNIFICANT CHANGE UP
RBC CASTS # UR COMP ASSIST: SIGNIFICANT CHANGE UP /HPF (ref 0–4)
SP GR SPEC: 1.01 — SIGNIFICANT CHANGE UP (ref 1.01–1.02)
TRI-PHOS CRY UR QL COMP ASSIST: ABNORMAL
UROBILINOGEN FLD QL: NEGATIVE MG/DL — SIGNIFICANT CHANGE UP
WBC UR QL: ABNORMAL

## 2019-07-30 PROCEDURE — 74182 MRI ABDOMEN W/CONTRAST: CPT | Mod: 26

## 2019-07-30 PROCEDURE — 99223 1ST HOSP IP/OBS HIGH 75: CPT

## 2019-07-30 RX ORDER — CEFTRIAXONE 500 MG/1
1000 INJECTION, POWDER, FOR SOLUTION INTRAMUSCULAR; INTRAVENOUS ONCE
Refills: 0 | Status: COMPLETED | OUTPATIENT
Start: 2019-07-30 | End: 2019-07-30

## 2019-07-30 RX ORDER — LABETALOL HCL 100 MG
200 TABLET ORAL ONCE
Refills: 0 | Status: COMPLETED | OUTPATIENT
Start: 2019-07-30 | End: 2019-07-30

## 2019-07-30 RX ORDER — HYDRALAZINE HCL 50 MG
1 TABLET ORAL
Qty: 60 | Refills: 0
Start: 2019-07-30 | End: 2019-08-18

## 2019-07-30 RX ORDER — LABETALOL HCL 100 MG
300 TABLET ORAL ONCE
Refills: 0 | Status: COMPLETED | OUTPATIENT
Start: 2019-07-30 | End: 2019-07-30

## 2019-07-30 RX ORDER — CEFTRIAXONE 500 MG/1
1000 INJECTION, POWDER, FOR SOLUTION INTRAMUSCULAR; INTRAVENOUS EVERY 24 HOURS
Refills: 0 | Status: DISCONTINUED | OUTPATIENT
Start: 2019-07-30 | End: 2019-08-01

## 2019-07-30 RX ORDER — HYDROCHLOROTHIAZIDE 25 MG
12.5 TABLET ORAL DAILY
Refills: 0 | Status: DISCONTINUED | OUTPATIENT
Start: 2019-07-30 | End: 2019-07-31

## 2019-07-30 RX ORDER — LABETALOL HCL 100 MG
300 TABLET ORAL DAILY
Refills: 0 | Status: DISCONTINUED | OUTPATIENT
Start: 2019-07-30 | End: 2019-07-31

## 2019-07-30 RX ORDER — HYDRALAZINE HCL 50 MG
50 TABLET ORAL EVERY 8 HOURS
Refills: 0 | Status: DISCONTINUED | OUTPATIENT
Start: 2019-07-30 | End: 2019-07-31

## 2019-07-30 RX ORDER — LABETALOL HCL 100 MG
10 TABLET ORAL ONCE
Refills: 0 | Status: COMPLETED | OUTPATIENT
Start: 2019-07-30 | End: 2019-07-30

## 2019-07-30 RX ORDER — AMLODIPINE BESYLATE 2.5 MG/1
1 TABLET ORAL
Qty: 20 | Refills: 0
Start: 2019-07-30 | End: 2019-08-18

## 2019-07-30 RX ORDER — HYDRALAZINE HCL 50 MG
10 TABLET ORAL ONCE
Refills: 0 | Status: COMPLETED | OUTPATIENT
Start: 2019-07-30 | End: 2019-07-30

## 2019-07-30 RX ORDER — SODIUM CHLORIDE 9 MG/ML
1000 INJECTION INTRAMUSCULAR; INTRAVENOUS; SUBCUTANEOUS ONCE
Refills: 0 | Status: COMPLETED | OUTPATIENT
Start: 2019-07-30 | End: 2019-07-30

## 2019-07-30 RX ORDER — HYDRALAZINE HCL 50 MG
50 TABLET ORAL ONCE
Refills: 0 | Status: COMPLETED | OUTPATIENT
Start: 2019-07-30 | End: 2019-07-30

## 2019-07-30 RX ORDER — LABETALOL HCL 100 MG
1 TABLET ORAL
Qty: 20 | Refills: 0
Start: 2019-07-30 | End: 2019-08-18

## 2019-07-30 RX ORDER — AMLODIPINE BESYLATE 2.5 MG/1
10 TABLET ORAL DAILY
Refills: 0 | Status: DISCONTINUED | OUTPATIENT
Start: 2019-07-30 | End: 2019-07-31

## 2019-07-30 RX ADMIN — Medication 975 MILLIGRAM(S): at 11:15

## 2019-07-30 RX ADMIN — Medication 50 MILLIGRAM(S): at 00:39

## 2019-07-30 RX ADMIN — SODIUM CHLORIDE 1000 MILLILITER(S): 9 INJECTION INTRAMUSCULAR; INTRAVENOUS; SUBCUTANEOUS at 02:13

## 2019-07-30 RX ADMIN — Medication 300 MILLIGRAM(S): at 14:40

## 2019-07-30 RX ADMIN — Medication 200 MILLIGRAM(S): at 03:54

## 2019-07-30 RX ADMIN — Medication 50 MILLIGRAM(S): at 19:03

## 2019-07-30 RX ADMIN — CEFTRIAXONE 100 MILLIGRAM(S): 500 INJECTION, POWDER, FOR SOLUTION INTRAMUSCULAR; INTRAVENOUS at 19:44

## 2019-07-30 RX ADMIN — Medication 50 MILLIGRAM(S): at 23:04

## 2019-07-30 RX ADMIN — Medication 10 MILLIGRAM(S): at 03:54

## 2019-07-30 RX ADMIN — Medication 300 MILLIGRAM(S): at 09:04

## 2019-07-30 RX ADMIN — AMLODIPINE BESYLATE 10 MILLIGRAM(S): 2.5 TABLET ORAL at 14:35

## 2019-07-30 RX ADMIN — Medication 10 MILLIGRAM(S): at 02:12

## 2019-07-30 RX ADMIN — Medication 12.5 MILLIGRAM(S): at 14:40

## 2019-07-30 NOTE — H&P ADULT - NSHPPHYSICALEXAM_GEN_ALL_CORE
T(C): 36.8 (07-30-19 @ 22:15), Max: 36.8 (07-30-19 @ 04:50)  HR: 80 (07-30-19 @ 22:15) (68 - 83)  BP: 160/95 (07-30-19 @ 22:15) (160/95 - 194/113)  RR: 17 (07-30-19 @ 22:15) (17 - 20)  SpO2: 98% (07-30-19 @ 22:15) (96% - 100%)    GENERAL: patient appears well, no acute distress, appropriate, pleasant  EYES: sclera clear, no exudates  ENMT: oropharynx clear without erythema, no exudates, moist mucous membranes  NECK: supple, soft, no thyromegaly noted  LUNGS: good air entry bilaterally, clear to auscultation, symmetric breath sounds, no wheezing or rhonchi appreciated  HEART: soft S1/S2, regular rate and rhythm, no murmurs noted, no lower extremity edema  GASTROINTESTINAL: abdomen is soft, nontender, nondistended, normoactive bowel sounds, no palpable masses  INTEGUMENT: good skin turgor, warm skin, appears well perfused  MUSCULOSKELETAL: no clubbing or cyanosis, no obvious deformity  NEUROLOGIC: awake, alert, oriented x3, good muscle tone in 4 extremities, no obvious sensory deficits  PSYCHIATRIC: mood is good, affect is congruent, linear and logical thought process  HEME/LYMPH: no palpable supraclavicular nodules, no obvious ecchymosis or petechiae

## 2019-07-30 NOTE — H&P ADULT - PROBLEM SELECTOR PLAN 4
IMPROVE VTE Individual Risk Assessment          RISK                                                          Points  [  ] Previous VTE                                                3  [  ] Thrombophilia                                             2  [  ] Lower limb paralysis                                   2        (unable to hold up >15 seconds)    [  ] Current Cancer                                             2         (within 6 months)  [  ] Immobilization > 24 hrs                              1  [  ] ICU/CCU stay > 24 hours                             1  [  ] Age > 60                                                         1    IMPROVE VTE Score: 1 for obesity   encourage ambulation

## 2019-07-30 NOTE — ED ADULT NURSE REASSESSMENT NOTE - COMFORT CARE
meal provided
meal provided/wait time explained/ambulated to bathroom/plan of care explained/po fluids offered

## 2019-07-30 NOTE — ED CDU PROVIDER DISPOSITION NOTE - CLINICAL COURSE
PT with Hx of poorly controlled HTN presents to the ED with HA found to have elevated BP placed in obs for CARD consult, MRI recommenced PT MRI read with suspicious mass on adrenals, PT case discussed with CARDs who agrees needs further work up due to recent + 24 hr urine, case discussed with hospitalist who agrees to admission. PT case discussed with hospitalist team that wants to have pt admitted for further evaluation and possible surgical intervention, pt made aware of need for admission and possible further treatments, pt states that they agree with need for admission and they understand all results and possible treatments. PT will be admitted to Hospitalist team and care will be transferred to admitting team.

## 2019-07-30 NOTE — ED CDU PROVIDER INITIAL DAY NOTE - PHYSICAL EXAMINATION
Gen: NAD, AOx3  Head: NCAT  HEENT: PERRL, EOMI, oral mucosa moist, normal conjunctiva, neck supple  Lung: CTAB, no respiratory distress  CV: rrr, no murmur, Normal perfusion  Abd: soft, NTND  MSK: No edema, no visible deformities.   Neuro: CN II-XII intact, 5/5 global strength, sensation intact, no dysmetria/ataxia, gait intact, No sensory changes to the digits.  Skin: No rash   Psych: normal affect

## 2019-07-30 NOTE — CONSULT NOTE ADULT - ASSESSMENT
A/P: 30 y/o M with a PMHx of uncontrolled HTN and obesity who presented to the ED with left finger numbness/tingling. Patient states that when he woke up yesterday he was having numbness and tingling in his left hand 2-4 digits, and the symptoms didn't improve throughout the day. Patient went to an urgent care to be evaluated, and was found to have a /109 so he was sent to the ED for management. Patient states that he has been taking all of his blood pressure medications, but hasn't followed up with a Cardiologist. Patient was asymptomatic other then the finger numbness and tingling, denies chest pain, SOB, orthopnea, PND, LE edema, headache, vision changes, or weakness. Patient states that he quit smoking since his last ER visit, but didn't follow up with anyone. Patient denies fevers, chills, CP, SOB, palpitations, syncope, near syncope, abdominal pain, N/V/D, headache, or dizziness.     1. Uncontrolled Hypertension  - Very elevated BP despite labetalol 300mg PO TID, norvasc 10mg qday, and Hydralazine 25 q8  - Patient states he has been taking his medications as prescribed  - Recent echo with normal BiV function, but with severe concentric LVH, grade I DD  - No renal artery stenosis on U/S  - Would increase hydralazine to 50mg q8, and add Losartan 50mg qday. Will discuss with Dr. White  - Will need further workup for hypertension as an outpatient including pheochromocytoma evaluation    2. Obesity  - Discussed importance of weight loss to help blood pressure and all around health    Preliminary evaluation, please await official recommendations by Dr. White A/P: 28 y/o M with a PMHx of uncontrolled HTN and obesity who presented to the ED with left finger numbness/tingling. Patient states that when he woke up yesterday he was having numbness and tingling in his left hand 2-4 digits, and the symptoms didn't improve throughout the day. Patient went to an urgent care to be evaluated, and was found to have a /109 so he was sent to the ED for management. Patient states that he has been taking all of his blood pressure medications, but hasn't followed up with a Cardiologist. Patient was asymptomatic other then the finger numbness and tingling, denies chest pain, SOB, orthopnea, PND, LE edema, headache, vision changes, or weakness. Patient states that he quit smoking since his last ER visit, but didn't follow up with anyone. Patient denies fevers, chills, CP, SOB, palpitations, syncope, near syncope, abdominal pain, N/V/D, headache, or dizziness.     1. Uncontrolled Hypertension  - Very elevated BP despite labetalol 300mg PO TID, norvasc 10mg qday, and Hydralazine 25 q8  - Patient states he has been taking his medications as prescribed  - Recent echo with normal BiV function, but with severe concentric LVH, grade I DD  - No renal artery stenosis on U/S  - Would increase hydralazine to 50mg q8, and add HCTZ 12.5mg qday  - Elevated urine metanephrines on last urine study. Repeat UA. Check MRI abdomen with IV contrast to evaluate adrenals for pheochromocytoma.     2. Obesity  - Discussed importance of weight loss to help blood pressure and all around health    Preliminary evaluation, please await official recommendations by Dr. White   Case discussed with GUSTAVO Gallardo A/P: 30 y/o M with a PMHx of uncontrolled HTN and obesity who presented to the ED with left finger numbness/tingling. Patient states that when he woke up yesterday he was having numbness and tingling in his left hand 2-4 digits, and the symptoms didn't improve throughout the day. Patient went to an urgent care to be evaluated, and was found to have a /109 so he was sent to the ED for management. Patient states that he has been taking all of his blood pressure medications, but hasn't followed up with a Cardiologist. Patient was asymptomatic other then the finger numbness and tingling, denies chest pain, SOB, orthopnea, PND, LE edema, headache, vision changes, or weakness. Patient states that he quit smoking since his last ER visit, but didn't follow up with anyone. Patient denies fevers, chills, CP, SOB, palpitations, syncope, near syncope, abdominal pain, N/V/D, headache, or dizziness.     1. Uncontrolled Hypertension  - Very elevated BP despite labetalol 300mg PO TID, norvasc 10mg qday, and Hydralazine 25 q8  - Patient states he has been taking his medications as prescribed  - Recent echo with normal BiV function, but with severe concentric LVH, grade I DD  - No renal artery stenosis on U/S  - Would increase hydralazine to 50mg q8, and add HCTZ 12.5mg qday  - Elevated urine metanephrines on last urine study. Repeat UA. Check MRI abdomen with IV contrast to evaluate adrenals for pheochromocytoma.     2. Obesity  - Discussed importance of weight loss to help blood pressure and all around health      Case discussed with GUSTAVO Gallardo

## 2019-07-30 NOTE — H&P ADULT - PROBLEM SELECTOR PLAN 3
IMPROVE VTE Individual Risk Assessment          RISK                                                          Points  [  ] Previous VTE                                                3  [  ] Thrombophilia                                             2  [  ] Lower limb paralysis                                   2        (unable to hold up >15 seconds)    [  ] Current Cancer                                             2         (within 6 months)  [  ] Immobilization > 24 hrs                              1  [  ] ICU/CCU stay > 24 hours                             1  [  ] Age > 60                                                         1    IMPROVE VTE Score: 1 for obesity   encourage ambulation with urinary frequency but no dysuria, flank pain   UA positive nitrite, small LE and mod epitheliale cells  will continue Ceftriaxone for now, can deescalate abx based on urine cx result

## 2019-07-30 NOTE — H&P ADULT - ASSESSMENT
30yo M with PMHx of controlled hypertension, single episode seizure(3/2019) due severe elevated BP with normal MRI brain and EEG, former smoker 1/2ppd presented to ED c/o constant left hand 2-4 digits tinglings that started last night with associated headache and urinary frequency admitted for uncontrolled hypertension with adrenal mass on MRI abdomen with cont r/p pheochromocytoma and symptomatic UTI.

## 2019-07-30 NOTE — ED CDU PROVIDER INITIAL DAY NOTE - ATTENDING CONTRIBUTION TO CARE
Pt seen and evaluated with PA.  Agree with observation and treatment plan29 yo M with hx of HTN, elevated despite taking medications as prescribed.  No assoc CP, SOB, N/V, diaphoresis or syncope.  On exam pt awake and alert in NAD, Cor Reg, Lungs clear, Abd soft, NT, BS+, Ext no edema, Neuro non-focal.  Pt with poor response to po mediation and  requiring IV medications.  Will continue to observe and have Cardio eval pt this AM

## 2019-07-30 NOTE — H&P ADULT - NSHPREVIEWOFSYSTEMS_GEN_ALL_CORE
CONSTITUTIONAL: denies fever, chills, fatigue, weakness  HEENT: denies blurred vision, sore throat  SKIN: denies new lesions, rash  CARDIOVASCULAR: denies chest pain, chest pressure, palpitations  RESPIRATORY: denies shortness of breath, sputum production  GASTROINTESTINAL: denies nausea, vomiting, diarrhea, abdominal pain  GENITOURINARY: denies dysuria, discharge  NEUROLOGICAL: left hand 2-4digits tinglings, +headache, denies numbness, focal weakness  MUSCULOSKELETAL: denies new joint pain, muscle aches  HEMATOLOGIC: denies gross bleeding, bruising  LYMPHATICS: denies enlarged lymph nodes, extremity swelling  PSYCHIATRIC: denies recent changes in anxiety, depression  ENDOCRINOLOGIC: denies sweating, cold or heat intolerance

## 2019-07-30 NOTE — ED ADULT NURSE REASSESSMENT NOTE - NS ED NURSE REASSESS COMMENT FT1
GUSTAVO guerrier at bedside to admit pt. pt verbalizes plan of care. BP reassessed, bp decreasing to normal limits.
Pt remains hypertensive, denies headache. GUSTAVO Tariq made aware. Patient placed on CM, NSR noted. EKG done. IV placed. Medications given per order. Pt resting comfortably, will monitor.
Pt resting comfortably, no s/s of distress. Denies headache. PT placed in OBS. Safety maintained.
Received report from LISSY Patel. Pt to be moved to CDU for observation.
assumed care of pt @ 1930, report received from Guerrero YUAN, charting as noted. pt AOx4 in NAD, BP elevated. pt received hydralazine PO with previous RN, will reassess BP. GUSTAVO Gallardo and GUSTAVO Acosta aware. awaiting MRI of abd/pelvis results. pt denies any pain at this time. HR is NSR on cardiac monitor, lung sounds are clear b/l, abd is soft and nontender with positive bowel sounds in all four quadrants, skin is warm, dry and appropriate for age and race. pt educated on plan of care and observation stay. Plan of care taught back to RN. Proficiency determined from successful pt teach back. Pt oriented to unit, staff, and room. Pt reeducated on call bell use. Bed locked in lowest position, call bell within reach. All questions and concerns addressed.     urine culture collected and sent to lab.
pt handed off to LISSY JO in stable condition. Pt oriented to unit, plan of care explained. RN reeducated pt on call bell system. no apparent distress noted at this time.
Pt provided breakfast tray.
Assumed pt care at this time. Pt A & OX4, breathing is even &* unlabored .Pt denies chest pain. Pt remains hypertensive MD aware, pt waiting for cardiology consult, aware of plan of care.

## 2019-07-30 NOTE — CONSULT NOTE ADULT - SUBJECTIVE AND OBJECTIVE BOX
[facility]                                                        Office: [address] [address]                                                       Telephone: [phone]. Fax:[phone]                                                              CARDIOLOGY CONSULTATION NOTE                                                                                             Consult requested by:  Dr. Neves    PCP: Alyssia Alfaro    Primary Cardiologist. Denies    Reason for Consultation: Hypertension    History obtained by: Patient and medical record     obtained: No    Chief complaint:    Patient is a 29y old  Male who presents with a chief complaint of finger tingling.     HPI: 28 y/o M with a PMHx of uncontrolled HTN and obesity who presented to the ED with left finger numbness/tingling. Patient states that when he woke up yesterday he was having numbness and tingling in his left hand 2-4 digits, and the symptoms didn't improve throughout the day. Patient went to an urgent care to be evaluated, and was found to have a /109 so he was sent to the ED for management. Patient states that he has been taking all of his blood pressure medications, but hasn't followed up with a Cardiologist. Patient was asymptomatic other then the finger numbness and tingling, denies chest pain, SOB, orthopnea, PND, LE edema, headache, vision changes, or weakness. Patient states that he quit smoking since his last ER visit, but didn't follow up with anyone. Patient denies fevers, chills, CP, SOB, palpitations, syncope, near syncope, abdominal pain, N/V/D, headache, or dizziness.     ALLERGIES: Allergies    No Known Allergies    Intolerances    CURRENT MEDICATIONS:  MEDICATIONS  (STANDING):      HOME MEDICATIONS:  Home Medications:  acetaminophen 325 mg oral tablet: 2 tab(s) orally every 6 hours, As needed, Mild Pain (1 - 3) (29 Jul 2019 20:27)    PAST MEDICAL HISTORY  HTN (hypertension)      PAST SURGICAL HISTORY  No significant past surgical history      FAMILY HISTORY:  FH: HTN (hypertension)  Father- CHF in his 50s      SOCIAL HISTORY:       CIGARETTES:   Former smoker, quit 4 months ago. Smoked 1/2 ppd x 10 years    ALCOHOL: Socially    DRUG ABUSE: Marijuana use.       REVIEW OF SYSTEMS:  CONSTITUTIONAL:  as per HPI  HEENT:  Eyes:  No diplopia or blurred vision. ENT:  No earache, sore throat or runny nose.  CARDIOVASCULAR:  No pressure, squeezing, strangling, tightness, heaviness or aching about the chest, neck, axilla or epigastrium.  RESPIRATORY:  No cough, shortness of breath, PND or orthopnea.  GASTROINTESTINAL:  No nausea, vomiting or diarrhea.  GENITOURINARY:  No dysuria, frequency or urgency. No Blood in urine  MUSCULOSKELETAL:  no joint aches, no muscle pain  SKIN:  No change in skin, hair or nails.  NEUROLOGIC:  AS PER HPI  PSYCHIATRIC:  No disorder of thought or mood.  ENDOCRINE:  No heat or cold intolerance, polyuria or polydipsia.  HEMATOLOGICAL:  No easy bruising or bleeding.     Vital Signs Last 24 Hrs  T(C): 36.8 (07-30-19 @ 04:50), Max: 37 (07-29-19 @ 18:32)  T(F): 98.2 (07-30-19 @ 04:50), Max: 98.6 (07-29-19 @ 18:32)  HR: 83 (07-30-19 @ 07:24) (68 - 86)  BP: 171/86 (07-30-19 @ 07:24) (161/112 - 194/113)  RR: 18 (07-30-19 @ 07:24) (18 - 20)  SpO2: 98% (07-30-19 @ 07:24) (98% - 100%)    PHYSICAL EXAM:  Constitutional: Comfortable . No acute distress.   HEENT: Atraumatic and normcephalic , neck is supple . no JVD. Unremarkable  CNS: Alert and awake, Grossly nonfocal.  Lymph Nodes: Cervical : Not palpable.  Respiratory: Bilateral clear breath sounds.  Cardiovascular: Normal S1S2. . No murmur/rubs or gallop.  Gastrointestinal: Soft non-tender and non distended . +Bowel sounds.   Extremities: No leg edema.   Psychiatric: Calm . no agitation.  Skin: No skin rash.    Intake and output:     LABS:                        14.2   8.97  )-----------( 248      ( 29 Jul 2019 20:24 )             39.1     07-29    142  |  106  |  13.0  ----------------------------<  108  4.1   |  28.0  |  1.09    Ca    9.7      29 Jul 2019 20:24        ;p-BNP=        INTERPRETATION OF TELEMETRY: Reviewed by me.   ECG: Reviewed by me. NSR, LVH with repolarization abnormalities, QS wave V1-V2, PRWP     RADIOLOGY & ADDITIONAL STUDIES/ECHO/CARDIAC CATH:   < from: TTE Echo Complete w/Doppler (03.13.19 @ 11:55) >  PHYSICIAN INTERPRETATION:  Left Ventricle: The left ventricular internal cavity size is normal.  Global LV systolic function was normal. Left ventricular ejection   fraction, by visual estimation, is 60 to 65%. Spectral Doppler shows   impaired relaxation pattern of left ventricular myocardial filling (Grade   I diastolic dysfunction).  Right Ventricle: Normal right ventricular size and function.  Left Atrium: Mildly enlarged left atrium.  Right Atrium: The right atrium is normal in size.  Pericardium: There is no evidence of pericardial effusion.  Mitral Valve: Structurally normal mitral valve, with normal leaflet   excursion. Trace mitral valve regurgitation is seen.  Tricuspid Valve: Structurally normal tricuspid valve, with normal leaflet   excursion. Trivial tricuspid regurgitation is visualized.  Aortic Valve: Peak Av velocity is 1.42 m/s, mean transaortic gradient   equals 4.0 mmHg, the calculated aortic valve area equals 2.97 cm² by the   continuity equation consistent with normally opening aortic valve. No   evidence of aortic valve regurgitation is seen.  Pulmonic Valve: Structurally normal pulmonic valve, with normal leaflet   excursion. Trace pulmonic valve regurgitation.  Aorta: The aortic root is normal in size and structure.  Pulmonary Artery: The main pulmonary artery is normal in size.  Venous: A normal flow pattern is recorded from the right upper pulmonary   vein. The inferior vena cava is normal. The inferior vena cava was normal   sized, with respiratory size variation greater than 50%. The inferior   vena cava and the hepatic vein show a normal flow pattern.       Summary:   1. Left ventricular ejection fraction, by visual estimation, is 60 to   65%.   2. Normal global left ventricular systolic function.   3. Spectral Doppler shows impaired relaxation pattern of left   ventricular myocardial filling (Grade I diastolic dysfunction).   4. There is severe concentric left ventricular hypertrophy.   5. Mildly enlarged left atrium.   6. Trace tricuspid regurgitation.   7. Trace pulmonic valve regurgitation.    [id number] Jonah Gillespie MD, Electronically signed on 3/13/2019 at 6:52:33 PM    < end of copied text >    < from: US Duplex Kidneys (03.13.19 @ 11:30) >  RIGHT KIDNEY:  Size : 12 cm, echotecture: normal , hydronephrosis: No  Origin renal artery: PSV  72 cm/s  Proximal renal artery: PSV  79 cm/s  Mid renal artery: PSV  90 cm/s  Distal renal artery: PSV  75 cm/s  Hilum: PSV  71 cm/s  RI: 0.48-0.55  RAR: 0.7  Renal vein: patent       LEFT KIDNEY:  Size : 15.5 cm, echotecture: multiple renal cysts, hydronephrosis: No  Origin renal artery: PSV  114 cm/s  Proximal renal artery: PSV  101 cm/s  Mid renal artery: PSV  58 cm/s  Distal renal artery: PSV  53 cm/s  Hilum: PSV  58 cm/s  RI: 0.44-0.66  RAR: 0.8  Renal vein: patent       Impression:    1. Right renal artery: No evidence of renal artery stenosis.  2. Left renal artery: No evidence of renal artery stenosis.  3. Patent bilateral renal veins      < end of copied text >

## 2019-07-30 NOTE — SBIRT NOTE ADULT - NSSBIRTALCNOACTINTDET_GEN_A_CORE
Provided SBIRT services: Full screen Negative. Positive reinforcement provided given patient currently within healthy guidelines. Education materials reviewed and given to patient.

## 2019-07-30 NOTE — H&P ADULT - PROBLEM SELECTOR PLAN 2
uncontrolled on multiple medication, ?pheochromocytoma with new finding of adrenal mass on MRI abdomen  continue amlodipine, hydralazine 50mg Q8, and labetalol 300mg daily.     evaluated by Cardio

## 2019-07-30 NOTE — ED CDU PROVIDER DISPOSITION NOTE - ATTENDING CONTRIBUTION TO CARE
I, Luke Stewart, performed a face to face bedside interview with this patient regarding history of present illness, review of symptoms and relevant past medical, social and family history.  I completed an independent physical examination. I have communicated the patient’s plan of care and disposition with the ACP.  29 year old with refractory htn placed on obs to r/o pheo, found to have nodularity to adrenal concernin for pheo, will admit  Gen: NAD, well appearing  CV: RRR  Pul: CTA b/l  Abd: Soft, non-distended, non-tender  Neuro: no focal deficits

## 2019-07-30 NOTE — H&P ADULT - PROBLEM SELECTOR PLAN 1
uncontrolled hypertension now with 1.2cm adrenal mass on MRI abdomen r/p Pheochromocytoma   elevated metanephrine on previous visit, repeat level   Endo consult

## 2019-07-30 NOTE — H&P ADULT - HISTORY OF PRESENT ILLNESS
30yo M with PMHx of controlled hypertension, single episode seizure(3/2019) due severe elevated BP with normal MRI brain and EEG, former smoker 1/2ppd presented to ED c/o constant left hand 2-4 digits tinglings that started last night with associated headache and urinary frequency. Denies fever, chills, dysuria, blurry visions, cp, sob, weakness. Pt with uncontrolled HTN on Labetalol hydralazine and amlodipine and states he takes his medication as prescribed. Admits to smoking 1/2PPD in the past but quit 4month ago. In the ED /112, labs pertinent for UA +nitrite, wbc urine 11-25, MRI abdomen with cont showed questionable slight nodular thickening of the left adrenal gland measuring up to 1.2 cm. pt also has elevated metanephrine level on last admission 3/2019 and renal US negative for renal artery stenosis.

## 2019-07-30 NOTE — ED CDU PROVIDER INITIAL DAY NOTE - PROGRESS NOTE DETAILS
Well-appearing. Vital signs holding. Denies headache, chest pain. Cards to see. Spoke with Gio from MRI. Dr. Cintron made aware. PT MRI read with suspicious mass on adrenals, PT case discussed with CARDs who agrees needs further work up due to recent + 24 hr urine, case discussed with hospitalist who agrees to admission.

## 2019-07-30 NOTE — ED CDU PROVIDER INITIAL DAY NOTE - OBJECTIVE STATEMENT
28 y/o M pt with significant PMHx of HTN presents to the ED c/o constant tingling to his left 2-4 digits that began last night, as well as a HA that began this morning and urinary frequency. Currently on Labetalol, hydralazine and water pills. Pt has not visited a PMD in a extended period of time. Former Tobacco User.  Denies missing any medication, weakness, vision changes, SOB, CP, tingling in his arms, trouble ambulating. No further complaints at this time.

## 2019-07-31 DIAGNOSIS — R82.71 BACTERIURIA: ICD-10-CM

## 2019-07-31 DIAGNOSIS — E27.9 DISORDER OF ADRENAL GLAND, UNSPECIFIED: ICD-10-CM

## 2019-07-31 LAB
ALBUMIN SERPL ELPH-MCNC: 4.2 G/DL — SIGNIFICANT CHANGE UP (ref 3.3–5.2)
ALP SERPL-CCNC: 48 U/L — SIGNIFICANT CHANGE UP (ref 40–120)
ALT FLD-CCNC: 28 U/L — SIGNIFICANT CHANGE UP
ANION GAP SERPL CALC-SCNC: 10 MMOL/L — SIGNIFICANT CHANGE UP (ref 5–17)
AST SERPL-CCNC: 24 U/L — SIGNIFICANT CHANGE UP
BILIRUB SERPL-MCNC: 0.6 MG/DL — SIGNIFICANT CHANGE UP (ref 0.4–2)
BUN SERPL-MCNC: 12 MG/DL — SIGNIFICANT CHANGE UP (ref 8–20)
CALCIUM SERPL-MCNC: 9.3 MG/DL — SIGNIFICANT CHANGE UP (ref 8.6–10.2)
CHLORIDE SERPL-SCNC: 100 MMOL/L — SIGNIFICANT CHANGE UP (ref 98–107)
CO2 SERPL-SCNC: 26 MMOL/L — SIGNIFICANT CHANGE UP (ref 22–29)
CREAT SERPL-MCNC: 1.09 MG/DL — SIGNIFICANT CHANGE UP (ref 0.5–1.3)
GLUCOSE SERPL-MCNC: 107 MG/DL — SIGNIFICANT CHANGE UP (ref 70–115)
HCT VFR BLD CALC: 39.4 % — SIGNIFICANT CHANGE UP (ref 39–50)
HGB BLD-MCNC: 14.4 G/DL — SIGNIFICANT CHANGE UP (ref 13–17)
MCHC RBC-ENTMCNC: 31.2 PG — SIGNIFICANT CHANGE UP (ref 27–34)
MCHC RBC-ENTMCNC: 36.5 GM/DL — HIGH (ref 32–36)
MCV RBC AUTO: 85.5 FL — SIGNIFICANT CHANGE UP (ref 80–100)
PLATELET # BLD AUTO: 243 K/UL — SIGNIFICANT CHANGE UP (ref 150–400)
POTASSIUM SERPL-MCNC: 3.4 MMOL/L — LOW (ref 3.5–5.3)
POTASSIUM SERPL-SCNC: 3.4 MMOL/L — LOW (ref 3.5–5.3)
PROT SERPL-MCNC: 7.2 G/DL — SIGNIFICANT CHANGE UP (ref 6.6–8.7)
RBC # BLD: 4.61 M/UL — SIGNIFICANT CHANGE UP (ref 4.2–5.8)
RBC # FLD: 11.7 % — SIGNIFICANT CHANGE UP (ref 10.3–14.5)
SODIUM SERPL-SCNC: 136 MMOL/L — SIGNIFICANT CHANGE UP (ref 135–145)
WBC # BLD: 9.14 K/UL — SIGNIFICANT CHANGE UP (ref 3.8–10.5)
WBC # FLD AUTO: 9.14 K/UL — SIGNIFICANT CHANGE UP (ref 3.8–10.5)

## 2019-07-31 PROCEDURE — 99233 SBSQ HOSP IP/OBS HIGH 50: CPT

## 2019-07-31 PROCEDURE — 99222 1ST HOSP IP/OBS MODERATE 55: CPT

## 2019-07-31 PROCEDURE — 93306 TTE W/DOPPLER COMPLETE: CPT | Mod: 26

## 2019-07-31 RX ORDER — SACCHAROMYCES BOULARDII 250 MG
250 POWDER IN PACKET (EA) ORAL
Refills: 0 | Status: DISCONTINUED | OUTPATIENT
Start: 2019-07-31 | End: 2019-08-05

## 2019-07-31 RX ORDER — HYDRALAZINE HCL 50 MG
10 TABLET ORAL EVERY 6 HOURS
Refills: 0 | Status: DISCONTINUED | OUTPATIENT
Start: 2019-07-31 | End: 2019-08-01

## 2019-07-31 RX ORDER — POTASSIUM CHLORIDE 20 MEQ
40 PACKET (EA) ORAL ONCE
Refills: 0 | Status: COMPLETED | OUTPATIENT
Start: 2019-07-31 | End: 2019-07-31

## 2019-07-31 RX ORDER — NICARDIPINE HYDROCHLORIDE 30 MG/1
15 CAPSULE, EXTENDED RELEASE ORAL
Qty: 40 | Refills: 0 | Status: DISCONTINUED | OUTPATIENT
Start: 2019-07-31 | End: 2019-08-01

## 2019-07-31 RX ORDER — ACETAMINOPHEN 500 MG
650 TABLET ORAL EVERY 6 HOURS
Refills: 0 | Status: DISCONTINUED | OUTPATIENT
Start: 2019-07-31 | End: 2019-08-09

## 2019-07-31 RX ORDER — DOXAZOSIN MESYLATE 4 MG
2 TABLET ORAL AT BEDTIME
Refills: 0 | Status: DISCONTINUED | OUTPATIENT
Start: 2019-07-31 | End: 2019-08-01

## 2019-07-31 RX ORDER — ENOXAPARIN SODIUM 100 MG/ML
40 INJECTION SUBCUTANEOUS DAILY
Refills: 0 | Status: DISCONTINUED | OUTPATIENT
Start: 2019-07-31 | End: 2019-08-03

## 2019-07-31 RX ORDER — HYDRALAZINE HCL 50 MG
10 TABLET ORAL ONCE
Refills: 0 | Status: COMPLETED | OUTPATIENT
Start: 2019-07-31 | End: 2019-07-31

## 2019-07-31 RX ADMIN — Medication 250 MILLIGRAM(S): at 17:39

## 2019-07-31 RX ADMIN — Medication 40 MILLIEQUIVALENT(S): at 16:50

## 2019-07-31 RX ADMIN — AMLODIPINE BESYLATE 10 MILLIGRAM(S): 2.5 TABLET ORAL at 05:16

## 2019-07-31 RX ADMIN — Medication 300 MILLIGRAM(S): at 05:16

## 2019-07-31 RX ADMIN — CEFTRIAXONE 100 MILLIGRAM(S): 500 INJECTION, POWDER, FOR SOLUTION INTRAMUSCULAR; INTRAVENOUS at 22:33

## 2019-07-31 RX ADMIN — Medication 650 MILLIGRAM(S): at 23:03

## 2019-07-31 RX ADMIN — Medication 12.5 MILLIGRAM(S): at 05:16

## 2019-07-31 RX ADMIN — Medication 10 MILLIGRAM(S): at 23:14

## 2019-07-31 RX ADMIN — Medication 650 MILLIGRAM(S): at 22:33

## 2019-07-31 RX ADMIN — Medication 50 MILLIGRAM(S): at 05:16

## 2019-07-31 RX ADMIN — Medication 10 MILLIGRAM(S): at 21:34

## 2019-07-31 RX ADMIN — Medication 2 MILLIGRAM(S): at 20:08

## 2019-07-31 RX ADMIN — Medication 10 MILLIGRAM(S): at 16:17

## 2019-07-31 NOTE — PROGRESS NOTE ADULT - ASSESSMENT
1) adrenal mass - endo consulted by nocturnist  --> will reach out to surgery for eval    2) htn urgency repeat tte   --> cardio following    3) uti - abx    4) hypokalemia - replete

## 2019-07-31 NOTE — CONSULT NOTE ADULT - SUBJECTIVE AND OBJECTIVE BOX
HPI:  28yo M with PMHx of hypertension, single episode seizure (3/2019) due severe elevated BP with normal MRI brain and EEG, former smoker 1/2ppd presented to ED c/o constant left hand 2-4 digits tinglings that started last night with associated headache and urinary frequency. Denies fever, chills, dysuria, blurry visions, cp, sob, weakness. Pt with uncontrolled HTN on Labetalol hydralazine and amlodipine and states he takes his medication as prescribed. Admits to smoking 1/2 PPD in the past but quit 4month ago. In the ED /112, labs pertinent for UA +nitrite, wbc urine 11-25, MRI abdomen with cont showed questionable slight nodular thickening of the left adrenal gland measuring up to 1.2 cm. pt also has elevated metanephrine level on last admission 3/2019 and renal US negative for renal artery stenosis.      MEDICATIONS  (STANDING):  amLODIPine   Tablet 10 milliGRAM(s) Oral daily  cefTRIAXone   IVPB 1000 milliGRAM(s) IV Intermittent every 24 hours  hydrALAZINE 50 milliGRAM(s) Oral every 8 hours  hydrochlorothiazide 12.5 milliGRAM(s) Oral daily  labetalol 300 milliGRAM(s) Oral daily  potassium chloride    Tablet ER 40 milliEquivalent(s) Oral once  saccharomyces boulardii 250 milliGRAM(s) Oral two times a day    MEDICATIONS  (PRN):  acetaminophen  Tablet .. 650 milliGRAM(s) Oral every 6 hours PRN Temp greater or equal to 38C (100.4F), Mild Pain (1 - 3)  hydrALAZINE Injectable 10 milliGRAM(s) IV Push every 6 hours PRN for sbp above 160 mmhg      Vital Signs Last 24 Hrs  T(C): 36.8 (2019 11:11), Max: 36.8 (2019 22:15)  T(F): 98.2 (2019 11:11), Max: 98.2 (2019 22:15)  HR: 89 (2019 11:11) (75 - 89)  BP: 125/70 (2019 11:11) (125/70 - 186/128)  BP(mean): --  RR: 18 (2019 11:11) (17 - 18)  SpO2: 98% (2019 11:11) (98% - 100%)      Physical Exam:    Constitutional: NAD, Obese  HEENT: PERRL, EOMI  Neck: No JVD, FROM without pain  Respiratory: Respirations non-labored, no accessory muscle use  Cardiovascular: Regular rate & rhythm  Gastrointestinal: Soft, non-tender, non-distended  Extremities: No peripheral edema, No cyanosis  Neurological: A&O x 3; without gross deficit  Musculoskeletal: No joint pain, swelling, deformity, or point tenderness; no limitation of movement      LABS:                        14.4   9.14  )-----------( 243      ( 2019 05:49 )             39.4         136  |  100  |  12.0  ----------------------------<  107  3.4<L>   |  26.0  |  1.09    Ca    9.3      2019 05:49    TPro  7.2  /  Alb  4.2  /  TBili  0.6  /  DBili  x   /  AST  24  /  ALT  28  /  AlkPhos  48        Radiology      INTERPRETATION:  MR ABDOMEN WITH IV CONTRAST    CLINICAL INFORMATION: Elevated urine metanephrine, evaluate adrenal mass    TECHNIQUE: Multiplanar T1-weighted, T2-weighted, and diffusion weighted   sequences were obtained of the abdomen, including dynamic post-contrast   T1-weighted sequences.    Field Strength: 1.5T    Contrast: 10 cc Gadavist.    COMPARISON: CTA abdomen and pelvis 3/12/2019.    FINDINGS:    LOWER CHEST: Clear.    LIVER: Normal.  BILE DUCTS: Nondilated.  GALLBLADDER: Normal.  SPLEEN: Normal.  PANCREAS: Normal.  ADRENALS: Questionable slight nodular thickening of the left adrenal   gland measuring up to 1.2 cm (4; 22).  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    VISUALIZED PORTIONS:    BOWEL: No bowel-related abnormality.  PERITONEUM: No ascites.  VESSELS:  Normal caliber aorta.  RETROPERITONEUM: No adenopathy or mass.    ABDOMINAL WALL: Normal.  BONES: No aggressive lesion.    IMPRESSION:    Questionable slight nodular thickening of the left adrenal gland   measuring up to 1.2 cm.     No other retroperitoneal mass to suggest extra adrenal paraganglioma.      Urinalysis Basic - ( 2019 11:24 )    Color: Yellow / Appearance: Clear / S.010 / pH: x  Gluc: x / Ketone: Negative  / Bili: Negative / Urobili: Negative mg/dL   Blood: x / Protein: Negative mg/dL / Nitrite: Positive   Leuk Esterase: Small / RBC: 0-2 /HPF / WBC 11-25   Sq Epi: x / Non Sq Epi: Moderate / Bacteria: Moderate HPI:  30yo M with PMHx of hypertension, single episode seizure (3/2019) due severe elevated BP with normal MRI brain and EEG, former smoker 1/2ppd presented to ED 2 days ago. He states he has had long standing hx of global diaphoresis, sweaty palms and intermittent HA's. He has recently c/o constant left hand 2-4 digits tinglings that started a couple of nights ago with associated headache and urinary frequency. Denies fever, chills, dysuria, blurry visions, cp, sob, weakness. Pt has had uncontrolled HTN on Labetalol, hydralazine and amlodipine and states he takes his medication as prescribed. Admits to smoking 1/2 PPD in the past but quit 4month ago. Upon arrival to ED /112, labs pertinent for UA +nitrite, wbc urine 11-25, MRI abdomen with cont showed questionable slight nodular thickening of the left adrenal gland measuring up to 1.2 cm. Pt. also had elevated metanephrine level on last admission 3/2019 and renal US negative for renal artery stenosis.       MEDICATIONS  (STANDING):  amLODIPine   Tablet 10 milliGRAM(s) Oral daily  cefTRIAXone   IVPB 1000 milliGRAM(s) IV Intermittent every 24 hours  hydrALAZINE 50 milliGRAM(s) Oral every 8 hours  hydrochlorothiazide 12.5 milliGRAM(s) Oral daily  labetalol 300 milliGRAM(s) Oral daily  potassium chloride    Tablet ER 40 milliEquivalent(s) Oral once  saccharomyces boulardii 250 milliGRAM(s) Oral two times a day    MEDICATIONS  (PRN):  acetaminophen  Tablet .. 650 milliGRAM(s) Oral every 6 hours PRN Temp greater or equal to 38C (100.4F), Mild Pain (1 - 3)  hydrALAZINE Injectable 10 milliGRAM(s) IV Push every 6 hours PRN for sbp above 160 mmhg      Vital Signs Last 24 Hrs  T(C): 36.8 (2019 11:11), Max: 36.8 (2019 22:15)  T(F): 98.2 (2019 11:11), Max: 98.2 (2019 22:15)  HR: 89 (2019 11:11) (75 - 89)  BP: 125/70 (2019 11:11) (125/70 - 186/128)  BP(mean): --  RR: 18 (2019 11:11) (17 - 18)  SpO2: 98% (2019 11:11) (98% - 100%)      Physical Exam:    Constitutional: NAD, Obese  HEENT: PERRL, EOMI  Neck: No JVD, FROM without pain  Respiratory: Respirations non-labored, no accessory muscle use  Cardiovascular: Regular rate & rhythm  Gastrointestinal: Soft, non-tender, non-distended  Extremities: No peripheral edema, No cyanosis  Neurological: A&O x 3; without gross deficit  Musculoskeletal: No joint pain, swelling, deformity, or point tenderness; no limitation of movement      LABS:                        14.4   9.14  )-----------( 243      ( 2019 05:49 )             39.4     07-    136  |  100  |  12.0  ----------------------------<  107  3.4<L>   |  26.0  |  1.09    Ca    9.3      2019 05:49    TPro  7.2  /  Alb  4.2  /  TBili  0.6  /  DBili  x   /  AST  24  /  ALT  28  /  AlkPhos  48  -      Radiology      INTERPRETATION:  MR ABDOMEN WITH IV CONTRAST    CLINICAL INFORMATION: Elevated urine metanephrine, evaluate adrenal mass    TECHNIQUE: Multiplanar T1-weighted, T2-weighted, and diffusion weighted   sequences were obtained of the abdomen, including dynamic post-contrast   T1-weighted sequences.    Field Strength: 1.5T    Contrast: 10 cc Gadavist.    COMPARISON: CTA abdomen and pelvis 3/12/2019.    FINDINGS:    LOWER CHEST: Clear.    LIVER: Normal.  BILE DUCTS: Nondilated.  GALLBLADDER: Normal.  SPLEEN: Normal.  PANCREAS: Normal.  ADRENALS: Questionable slight nodular thickening of the left adrenal   gland measuring up to 1.2 cm (4; 22).  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    VISUALIZED PORTIONS:    BOWEL: No bowel-related abnormality.  PERITONEUM: No ascites.  VESSELS:  Normal caliber aorta.  RETROPERITONEUM: No adenopathy or mass.    ABDOMINAL WALL: Normal.  BONES: No aggressive lesion.    IMPRESSION:    Questionable slight nodular thickening of the left adrenal gland   measuring up to 1.2 cm.     No other retroperitoneal mass to suggest extra adrenal paraganglioma.      Urinalysis Basic - ( 2019 11:24 )    Color: Yellow / Appearance: Clear / S.010 / pH: x  Gluc: x / Ketone: Negative  / Bili: Negative / Urobili: Negative mg/dL   Blood: x / Protein: Negative mg/dL / Nitrite: Positive   Leuk Esterase: Small / RBC: 0-2 /HPF / WBC 11-25   Sq Epi: x / Non Sq Epi: Moderate / Bacteria: Moderate

## 2019-07-31 NOTE — CHART NOTE - NSCHARTNOTEFT_GEN_A_CORE
Patient seen earlier this evening. BP was 180/109 without any complaints. Seen ambulating the halls without issues.   Patient's pm cardura  was given early and rechecked the BP an hour later, pt still hypertensive above goal of 160 systolic. PRN hydralazine given.   BP remains poorly controlled despite hydralazine and alpha blockade.   Dr. Jaramillo notified, will transfer to SICU for BP control on cardene gtt.

## 2019-07-31 NOTE — CONSULT NOTE ADULT - ASSESSMENT
Assessment: 29 year old male presenting with diaphoresis, intermittent HA, urinary frequency, severely elevated BP and intermittent tachycardia likely 2/2 Pheochromocytoma.     Plan:  Transfer from medicine to surgical service under Dr. Jaramillo  US Neck to eval thyroid and parathyroid  Urine normetanephrines and metanephrines  Serum normetanephrines and metanephrines  Calcitonin  PTH  Calcium  Aldosterone  VMA Urine  24 hour cortisol  Doxazosin 1 mg q daily for HTN with increased dosage every 1-2 days as needed for BP control  IV hydralazine pushes for SBP >160  d/c labetalol, amlodipine  monitor vital signs q 4 hours  Rocephin for UTI

## 2019-07-31 NOTE — PROGRESS NOTE ADULT - SUBJECTIVE AND OBJECTIVE BOX
[facility]                                                        Office: [address] [address]                                                       Telephone: [phone]. Fax:[phone]                                                                             PROGRESS NOTE   Reason for follow up: Uncontrolled HTN, Adrenal mass on MRI concerning for pheochromocytoma                               Overnight: No new events.   Update: Patient is feeling well, being evaluated by the Surgery team.     Subjective: "I feel fine."   Complains of: No complaints  Review of symptoms: Cardiac:  No chest pain. No dyspnea. No palpitations.  Respiratory:no cough. No dyspnea  Gastrointestinal: No diarrhea. No abdominal pain. No bleeding.     Past medical history: No updates.   Chronic conditions:  Hypertension: improved   	  Vitals:  T(C): 36.8 (07-31-19 @ 11:11), Max: 36.8 (07-30-19 @ 22:15)  HR: 89 (07-31-19 @ 11:11) (75 - 89)  BP: 125/70 (07-31-19 @ 11:11) (125/70 - 186/128)  RR: 18 (07-31-19 @ 11:11) (17 - 18)  SpO2: 98% (07-31-19 @ 11:11) (98% - 100%)  Wt(kg): --  I&O's Summary    Weight (kg): 136.1 (07-29 @ 18:32)      PHYSICAL EXAM:  Constitutional: Comfortable . No acute distress.   HEENT: Atraumatic and normcephalic , neck is supple . no JVD. Unremarkable  CNS: Alert and awake, Grossly nonfocal.  Lymph Nodes: Cervical : Not palpable.  Respiratory: Bilateral clear breath sounds.  Cardiovascular: Normal S1S2. . No murmur/rubs or gallop.  Gastrointestinal: Soft non-tender and non distended . +Bowel sounds.   Extremities: No leg edema.   Psychiatric: Calm . no agitation.  Skin: No skin rash.    CURRENT MEDICATIONS:  amLODIPine   Tablet 10 milliGRAM(s) Oral daily  doxazosin 2 milliGRAM(s) Oral at bedtime  hydrALAZINE 50 milliGRAM(s) Oral every 8 hours  hydrALAZINE Injectable 10 milliGRAM(s) IV Push every 6 hours PRN  hydrochlorothiazide 12.5 milliGRAM(s) Oral daily    cefTRIAXone   IVPB  potassium chloride    Tablet ER      LABS:	 	                              14.4   9.14  )-----------( 243      ( 31 Jul 2019 05:49 )             39.4     07-31    136  |  100  |  12.0  ----------------------------<  107  3.4<L>   |  26.0  |  1.09    Ca    9.3      31 Jul 2019 05:49    TPro  7.2  /  Alb  4.2  /  TBili  0.6  /  DBili  x   /  AST  24  /  ALT  28  /  AlkPhos  48  07-31    proBNP:   Lipid Profile:   HgA1c: TSH:     TELEMETRY: Reviewed  SR      DIAGNOSTIC TESTING:  [ ] Echocardiogram:   < from: TTE Echo Complete w/Doppler (03.13.19 @ 11:55) >     PHYSICIAN INTERPRETATION:  Left Ventricle: The left ventricular internal cavity size is normal.  Global LV systolic function was normal. Left ventricular ejection   fraction, by visual estimation, is 60 to 65%. Spectral Doppler shows   impaired relaxation pattern of left ventricular myocardial filling (Grade   I diastolic dysfunction).  Right Ventricle: Normal right ventricular size and function.  Left Atrium: Mildly enlarged left atrium.  Right Atrium: The right atrium is normal in size.  Pericardium: There is no evidence of pericardial effusion.  Mitral Valve: Structurally normal mitral valve, with normal leaflet   excursion. Trace mitral valve regurgitation is seen.  Tricuspid Valve: Structurally normal tricuspid valve, with normal leaflet   excursion. Trivial tricuspid regurgitation is visualized.  Aortic Valve: Peak Av velocity is 1.42 m/s, mean transaortic gradient   equals 4.0 mmHg, the calculated aortic valve area equals 2.97 cm² by the   continuity equation consistent with normally opening aortic valve. No   evidence of aortic valve regurgitation is seen.  Pulmonic Valve: Structurally normal pulmonic valve, with normal leaflet   excursion. Trace pulmonic valve regurgitation.  Aorta: The aortic root is normal in size and structure.  Pulmonary Artery: The main pulmonary artery is normal in size.  Venous: A normal flow pattern is recorded from the right upper pulmonary   vein. The inferior vena cava is normal. The inferior vena cava was normal   sized, with respiratory size variation greater than 50%. The inferior   vena cava and the hepatic vein show a normal flow pattern.       Summary:   1. Left ventricular ejection fraction, by visual estimation, is 60 to   65%.   2. Normal global left ventricular systolic function.   3. Spectral Doppler shows impaired relaxation pattern of left   ventricular myocardial filling (Grade I diastolic dysfunction).   4. There is severe concentric left ventricular hypertrophy.   5. Mildly enlarged left atrium.   6. Trace tricuspid regurgitation.   7. Trace pulmonic valve regurgitation.    [id number] Jonah Gillespie MD, Electronically signed on 3/13/2019 at 6:52:33 PM       < end of copied text >    < from: MR Abdomen w/ IV Cont (07.30.19 @ 18:52) >  FINDINGS:    LOWER CHEST: Clear.    LIVER: Normal.  BILE DUCTS: Nondilated.  GALLBLADDER: Normal.  SPLEEN: Normal.  PANCREAS: Normal.  ADRENALS: Questionable slight nodular thickening of the left adrenal   gland measuring up to 1.2 cm (4; 22).  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    VISUALIZED PORTIONS:    BOWEL: No bowel-related abnormality.  PERITONEUM: No ascites.  VESSELS:  Normal caliber aorta.  RETROPERITONEUM: No adenopathy or mass.    ABDOMINAL WALL: Normal.  BONES: No aggressive lesion.    IMPRESSION:    Questionable slight nodular thickening of the left adrenal gland   measuring up to 1.2 cm.     No other retroperitoneal mass to suggest extra adrenal paraganglioma.      < end of copied text >

## 2019-07-31 NOTE — CONSULT NOTE ADULT - SUBJECTIVE AND OBJECTIVE BOX
HPI:  30yo M with PMHx of uncontrolled hypertension, single episode seizure(3/2019) due severe elevated BP with normal MRI brain and EEG, former smoker 1/2ppd presented to ED c/o constant left hand 2-4 digits tinglings that started last night with associated headache and urinary frequency. Denies fever, chills, dysuria, blurry visions, cp, sob, weakness. Pt with uncontrolled HTN on Labetalol hydralazine and amlodipine and states he takes his medication as prescribed. Admits to smoking 1/2PPD in the past but quit 4month ago. In the ED /112, labs pertinent for UA +nitrite, wbc urine 11-25, MRI abdomen with cont showed questionable slight nodular thickening of the left adrenal gland measuring up to 1.2 cm. pt also has elevated metanephrine level on last admission 3/2019 and renal US negative for renal artery stenosis. (2019 22:42)    He stated that he was diagnosed with hypertension at the age of 12 and there is a strong family history of hypertension.  He denies nausea or vomiting.  He reports sporadic headaches, palpitations and sweating but denies episodic symptoms.  He denies weight loss or tachycardia or panic attacks.        PAST MEDICAL & SURGICAL HISTORY:  HTN (hypertension)  No significant past surgical history      FAMILY HISTORY:  FH: HTN (hypertension) and DM      SOCIAL HISTORY: (+) marijuana daily. h/o tobacco, social EtOH on weekeends 1-2 beers/shots    REVIEW OF SYSTEMS:  Constitutional: No fever, no chills, no change in weight.  Eyes: No blurry vision, no loss of vision.  Lungs: No shortness of breath, no wheezing, no cough  CV: No chest pain, no palpitations, no pain with walking.  GI: No nausea, no vomiting, no constipation, no diarrhea, no abdominal pain  : (+) polyurina (+) nocturia  Musculoskeletal: No muscle pain, no joint pain, no swelling.  Skin: No rash, no infections.  Neurologic: No headaches  Psych: No depression, no anxiety      MEDICATIONS  (STANDING):  cefTRIAXone   IVPB 1000 milliGRAM(s) IV Intermittent every 24 hours  doxazosin 2 milliGRAM(s) Oral at bedtime  saccharomyces boulardii 250 milliGRAM(s) Oral two times a day    MEDICATIONS  (PRN):  acetaminophen   Tablet .. 650 milliGRAM(s) Oral every 6 hours PRN Temp greater or equal to 38C (100.4F), Mild Pain (1 - 3)  hydrALAZINE Injectable 10 milliGRAM(s) IV Push every 6 hours PRN for sbp above 160 mmhg      Allergies  No Known Allergies    PHYSICAL EXAM:    Vital Signs Last 24 Hrs  T(C): 37.1 (2019 15:22), Max: 37.1 (2019 15:22)  T(F): 98.7 (2019 15:22), Max: 98.7 (2019 15:22)  HR: 89 (2019 15:22) (77 - 89)  BP: 177/120 (2019 15:22) (125/70 - 186/128)  BP(mean): --  RR: 18 (2019 15:22) (17 - 18)  SpO2: 99% (2019 15:22) (98% - 100%)    General appearance: Well developed, well nourished. Obese male, no Cushingoid features  Eyes: Pupils equal EOMI  Lungs: Normal respiratory excursion. Lungs clear. no w/r/r  CV: Regular cardiac rhythm. No murmur. No edema  Abdomen: Soft, non tender, obese  Skin: Warm and moist. No rash. (+) acanthosis.  Neuro: Cranial nerves intact. Normal motor and sensory function. DTR's normal.  Psych: Normal affect, good judgement.      LABS:                        14.4   9.14  )-----------( 243      ( 2019 05:49 )             39.4         136  |  100  |  12.0  ----------------------------<  107  3.4<L>   |  26.0  |  1.09    Ca    9.3      2019 05:49    TPro  7.2  /  Alb  4.2  /  TBili  0.6  /  DBili  x   /  AST  24  /  ALT  28  /  AlkPhos  48  07    Urinalysis Basic - ( 2019 11:24 )    Color: Yellow / Appearance: Clear / S.010 / pH: x  Gluc: x / Ketone: Negative  / Bili: Negative / Urobili: Negative mg/dL   Blood: x / Protein: Negative mg/dL / Nitrite: Positive   Leuk Esterase: Small / RBC: 0-2 /HPF / WBC 11-25   Sq Epi: x / Non Sq Epi: Moderate / Bacteria: Moderate      LIVER FUNCTIONS - ( 2019 05:49 )  Alb: 4.2 g/dL / Pro: 7.2 g/dL / ALK PHOS: 48 U/L / ALT: 28 U/L / AST: 24 U/L / GGT: x             < from: MR Abdomen w/ IV Cont (19 @ 18:52) >   EXAM:  MR ABDOMEN IC                        PROCEDURE DATE:  2019      INTERPRETATION:  MR ABDOMEN WITH IV CONTRAST  CLINICAL INFORMATION: Elevated urine metanephrine, evaluate adrenal mass  TECHNIQUE: Multiplanar T1-weighted, T2-weighted, and diffusion weighted   sequences were obtained of the abdomen, including dynamic post-contrast   T1-weighted sequences.  Field Strength: 1.5T    Contrast: 10 cc Gadavist.    COMPARISON: CTA abdomen and pelvis 3/12/2019.    FINDINGS:    LOWER CHEST: Clear.    LIVER: Normal.  BILE DUCTS: Nondilated.  GALLBLADDER: Normal.  SPLEEN: Normal.  PANCREAS: Normal.  ADRENALS: Questionable slight nodular thickening of the left adrenal   gland measuring up to 1.2 cm (4; 22).  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    VISUALIZED PORTIONS:    BOWEL: No bowel-related abnormality.  PERITONEUM: No ascites.  VESSELS:  Normal caliber aorta.  RETROPERITONEUM: No adenopathy or mass.    ABDOMINAL WALL: Normal.  BONES: No aggressive lesion.    IMPRESSION:    Questionable slight nodular thickening of the left adrenal gland   measuring up to 1.2 cm.     No other retroperitoneal mass to suggest extra adrenal paraganglioma.      < end of copied text >

## 2019-07-31 NOTE — PROGRESS NOTE ADULT - ASSESSMENT
A/P: 30 y/o M with a PMHx of uncontrolled HTN and obesity who presented to the ED with left finger numbness/tingling. Patient states that when he woke up yesterday he was having numbness and tingling in his left hand 2-4 digits, and the symptoms didn't improve throughout the day. Patient went to an urgent care to be evaluated, and was found to have a /109 so he was sent to the ED for management. Patient states that he has been taking all of his blood pressure medications, but hasn't followed up with a Cardiologist. Patient was asymptomatic other then the finger numbness and tingling, denies chest pain, SOB, orthopnea, PND, LE edema, headache, vision changes, or weakness. Patient states that he quit smoking since his last ER visit, but didn't follow up with anyone. Patient denies fevers, chills, CP, SOB, palpitations, syncope, near syncope, abdominal pain, N/V/D, headache, or dizziness.     1. Uncontrolled Hypertension  - Elevated urine metanephrines on last UA  - Now with adrenal mass noted on MR Abdomen concerning for pheochromocytoma  - Advised to D/C beta blockade, and initiate alpha blockade  - Start Doxazosin 2mg QHS, uptitrate as needed  - Continue norvasc 10mg, Hydralazine 50mg q8, and HCTZ 12.5mg qday  - Repeat TTE ordered by surgical team   - Surgical team evaluating the patient       2. Obesity  - Discussed importance of weight loss to help blood pressure and all around health

## 2019-07-31 NOTE — PROGRESS NOTE ADULT - SUBJECTIVE AND OBJECTIVE BOX
RENU VALLECILLO    [id number]    29y      Male    INTERVAL HPI/OVERNIGHT EVENTS:    patient being seen for adrenal mass and uncontrolled htn. patient seen at bedside and denies any acute complaints.     REVIEW OF SYSTEMS:    CONSTITUTIONAL: No fever, weight loss, or fatigue  RESPIRATORY: No cough, wheezing, hemoptysis; No shortness of breath  CARDIOVASCULAR: No chest pain, palpitations  GASTROINTESTINAL: No abdominal or epigastric pain. No nausea, vomiting  NEUROLOGICAL: No headaches, memory loss, loss of strength.  MISCELLANEOUS:      Vital Signs Last 24 Hrs  T(C): 36.6 (2019 07:29), Max: 36.8 (2019 22:15)  T(F): 97.8 (2019 07:29), Max: 98.2 (2019 22:15)  HR: 83 (2019 07:) (75 - 83)  BP: 149/92 (2019 07:29) (149/92 - 186/128)  BP(mean): --  RR: 18 (2019 07:29) (17 - 18)  SpO2: 99% (2019 07:29) (96% - 100%)    PHYSICAL EXAM:    GENERAL: NAD, well-groomed  HEENT: PERRL, +EOMI  NECK: soft, Supple, No JVD,   CHEST/LUNG: Clear to auscultation bilaterally; No wheezing  HEART: S1S2+, Regular rate and rhythm; No murmurs, rubs, or gallops  ABDOMEN: Soft, Nontender, Nondistended; Bowel sounds present  EXTREMITIES:  2+ Peripheral Pulses, No clubbing, cyanosis, or edema  SKIN: No rashes or lesions  NEURO: AAOX3, no focal deficits, no motor r sensory loss  PSYCH: normal mood      LABS:                        14.4   9.14  )-----------( 243      ( 2019 05:49 )             39.4     -    136  |  100  |  12.0  ----------------------------<  107  3.4<L>   |  26.0  |  1.09    Ca    9.3      2019 05:49    TPro  7.2  /  Alb  4.2  /  TBili  0.6  /  DBili  x   /  AST  24  /  ALT  28  /  AlkPhos  48  07-31      Urinalysis Basic - ( 2019 11:24 )    Color: Yellow / Appearance: Clear / S.010 / pH: x  Gluc: x / Ketone: Negative  / Bili: Negative / Urobili: Negative mg/dL   Blood: x / Protein: Negative mg/dL / Nitrite: Positive   Leuk Esterase: Small / RBC: 0-2 /HPF / WBC 11-25   Sq Epi: x / Non Sq Epi: Moderate / Bacteria: Moderate          MEDICATIONS  (STANDING):  amLODIPine   Tablet 10 milliGRAM(s) Oral daily  cefTRIAXone   IVPB 1000 milliGRAM(s) IV Intermittent every 24 hours  hydrALAZINE 50 milliGRAM(s) Oral every 8 hours  hydrochlorothiazide 12.5 milliGRAM(s) Oral daily  labetalol 300 milliGRAM(s) Oral daily  potassium chloride    Tablet ER 40 milliEquivalent(s) Oral once  saccharomyces boulardii 250 milliGRAM(s) Oral two times a day    MEDICATIONS  (PRN):  acetaminophen   Tablet .. 650 milliGRAM(s) Oral every 6 hours PRN Temp greater or equal to 38C (100.4F), Mild Pain (1 - 3)  hydrALAZINE Injectable 10 milliGRAM(s) IV Push every 6 hours PRN for sbp above 160 mmhg      RADIOLOGY & ADDITIONAL TESTS:

## 2019-07-31 NOTE — CHART NOTE - NSCHARTNOTEFT_GEN_A_CORE
Called by RN with BP of pt 182/105 after i.v. hydralazine and cardura.  Pt is asymptomatic at this time    D/W cardiology PA COREY Herzog, agrees with additional dose of hydralazine 10mg i.v. once due to patient large body habitus.  Order written.  RN called.

## 2019-07-31 NOTE — CONSULT NOTE ADULT - ASSESSMENT
29 year old male with Left adrenal mass/thickening up to 1.2 cm with long standing h/o HTN since 12 years old.  Elevated plasma normetaphrines on prior admission 3 months ago. Differential diagnoses included - pheochromocytoma vs aldosterone production adrenal tumor vs benign adrenal adenoma.  He is not Cushingoid  - repeat plasma metaphrines/normetanephrines tomorrow am, also check 24 hour urine testing metaphrines/catecholamines  - cont alpha blockade with doxazosin  - check renin and dasia levels    Also with acanthosis nigrcans and Fam hx of diabetes - check HbA1c levels

## 2019-08-01 ENCOUNTER — OUTPATIENT (OUTPATIENT)
Dept: OUTPATIENT SERVICES | Facility: HOSPITAL | Age: 29
LOS: 1 days | End: 2019-08-01
Payer: MEDICAID

## 2019-08-01 LAB
ANION GAP SERPL CALC-SCNC: 11 MMOL/L — SIGNIFICANT CHANGE UP (ref 5–17)
BUN SERPL-MCNC: 12 MG/DL — SIGNIFICANT CHANGE UP (ref 8–20)
CA-I BLD-SCNC: 1.26 MMOL/L — SIGNIFICANT CHANGE UP (ref 1.12–1.3)
CALCIT SERPL-MCNC: <1 PG/ML — SIGNIFICANT CHANGE UP
CALCIUM SERPL-MCNC: 10.1 MG/DL — SIGNIFICANT CHANGE UP (ref 8.4–10.5)
CALCIUM SERPL-MCNC: 9.5 MG/DL — SIGNIFICANT CHANGE UP (ref 8.6–10.2)
CHLORIDE SERPL-SCNC: 98 MMOL/L — SIGNIFICANT CHANGE UP (ref 98–107)
CO2 SERPL-SCNC: 26 MMOL/L — SIGNIFICANT CHANGE UP (ref 22–29)
CREAT SERPL-MCNC: 1.01 MG/DL — SIGNIFICANT CHANGE UP (ref 0.5–1.3)
CULTURE RESULTS: SIGNIFICANT CHANGE UP
GLUCOSE SERPL-MCNC: 103 MG/DL — SIGNIFICANT CHANGE UP (ref 70–115)
HBA1C BLD-MCNC: 5 % — SIGNIFICANT CHANGE UP (ref 4–5.6)
MAGNESIUM SERPL-MCNC: 2.4 MG/DL — SIGNIFICANT CHANGE UP (ref 1.6–2.6)
PHOSPHATE SERPL-MCNC: 4.4 MG/DL — SIGNIFICANT CHANGE UP (ref 2.4–4.7)
POTASSIUM SERPL-MCNC: 3.4 MMOL/L — LOW (ref 3.5–5.3)
POTASSIUM SERPL-SCNC: 3.4 MMOL/L — LOW (ref 3.5–5.3)
PTH-INTACT FLD-MCNC: 29 PG/ML — SIGNIFICANT CHANGE UP (ref 15–65)
SODIUM SERPL-SCNC: 135 MMOL/L — SIGNIFICANT CHANGE UP (ref 135–145)
SPECIMEN SOURCE: SIGNIFICANT CHANGE UP
T3 SERPL-MCNC: 164 NG/DL — SIGNIFICANT CHANGE UP (ref 80–200)
T4 AB SER-ACNC: 9.1 UG/DL — SIGNIFICANT CHANGE UP (ref 4.5–12)
TSH SERPL-MCNC: 2.64 UIU/ML — SIGNIFICANT CHANGE UP (ref 0.27–4.2)

## 2019-08-01 PROCEDURE — 99291 CRITICAL CARE FIRST HOUR: CPT

## 2019-08-01 PROCEDURE — 99232 SBSQ HOSP IP/OBS MODERATE 35: CPT

## 2019-08-01 PROCEDURE — G9001: CPT

## 2019-08-01 PROCEDURE — 76536 US EXAM OF HEAD AND NECK: CPT | Mod: 26

## 2019-08-01 PROCEDURE — 99233 SBSQ HOSP IP/OBS HIGH 50: CPT

## 2019-08-01 RX ORDER — AMLODIPINE BESYLATE 2.5 MG/1
10 TABLET ORAL DAILY
Refills: 0 | Status: DISCONTINUED | OUTPATIENT
Start: 2019-08-01 | End: 2019-08-06

## 2019-08-01 RX ORDER — NICARDIPINE HYDROCHLORIDE 30 MG/1
30 CAPSULE, EXTENDED RELEASE ORAL
Refills: 0 | Status: DISCONTINUED | OUTPATIENT
Start: 2019-08-01 | End: 2019-08-01

## 2019-08-01 RX ORDER — HYDRALAZINE HCL 50 MG
10 TABLET ORAL EVERY 4 HOURS
Refills: 0 | Status: DISCONTINUED | OUTPATIENT
Start: 2019-08-01 | End: 2019-08-02

## 2019-08-01 RX ORDER — HYDROMORPHONE HYDROCHLORIDE 2 MG/ML
0.5 INJECTION INTRAMUSCULAR; INTRAVENOUS; SUBCUTANEOUS ONCE
Refills: 0 | Status: DISCONTINUED | OUTPATIENT
Start: 2019-08-01 | End: 2019-08-01

## 2019-08-01 RX ORDER — CHLORHEXIDINE GLUCONATE 213 G/1000ML
1 SOLUTION TOPICAL DAILY
Refills: 0 | Status: DISCONTINUED | OUTPATIENT
Start: 2019-08-01 | End: 2019-08-09

## 2019-08-01 RX ORDER — HYDRALAZINE HCL 50 MG
50 TABLET ORAL EVERY 8 HOURS
Refills: 0 | Status: DISCONTINUED | OUTPATIENT
Start: 2019-08-01 | End: 2019-08-03

## 2019-08-01 RX ORDER — POTASSIUM CHLORIDE 20 MEQ
40 PACKET (EA) ORAL EVERY 4 HOURS
Refills: 0 | Status: COMPLETED | OUTPATIENT
Start: 2019-08-01 | End: 2019-08-01

## 2019-08-01 RX ORDER — DOXAZOSIN MESYLATE 4 MG
2 TABLET ORAL ONCE
Refills: 0 | Status: COMPLETED | OUTPATIENT
Start: 2019-08-01 | End: 2019-08-01

## 2019-08-01 RX ORDER — DOXAZOSIN MESYLATE 4 MG
4 TABLET ORAL AT BEDTIME
Refills: 0 | Status: DISCONTINUED | OUTPATIENT
Start: 2019-08-01 | End: 2019-08-02

## 2019-08-01 RX ORDER — NICARDIPINE HYDROCHLORIDE 30 MG/1
15 CAPSULE, EXTENDED RELEASE ORAL
Qty: 40 | Refills: 0 | Status: DISCONTINUED | OUTPATIENT
Start: 2019-08-01 | End: 2019-08-02

## 2019-08-01 RX ORDER — HYDROCHLOROTHIAZIDE 25 MG
12.5 TABLET ORAL DAILY
Refills: 0 | Status: DISCONTINUED | OUTPATIENT
Start: 2019-08-01 | End: 2019-08-06

## 2019-08-01 RX ADMIN — Medication 4 MILLIGRAM(S): at 21:41

## 2019-08-01 RX ADMIN — AMLODIPINE BESYLATE 10 MILLIGRAM(S): 2.5 TABLET ORAL at 10:52

## 2019-08-01 RX ADMIN — NICARDIPINE HYDROCHLORIDE 75 MG/HR: 30 CAPSULE, EXTENDED RELEASE ORAL at 23:08

## 2019-08-01 RX ADMIN — Medication 50 MILLIGRAM(S): at 14:32

## 2019-08-01 RX ADMIN — HYDROMORPHONE HYDROCHLORIDE 0.5 MILLIGRAM(S): 2 INJECTION INTRAMUSCULAR; INTRAVENOUS; SUBCUTANEOUS at 02:04

## 2019-08-01 RX ADMIN — Medication 250 MILLIGRAM(S): at 06:15

## 2019-08-01 RX ADMIN — Medication 12.5 MILLIGRAM(S): at 10:52

## 2019-08-01 RX ADMIN — Medication 40 MILLIEQUIVALENT(S): at 17:31

## 2019-08-01 RX ADMIN — Medication 40 MILLIEQUIVALENT(S): at 10:52

## 2019-08-01 RX ADMIN — Medication 250 MILLIGRAM(S): at 17:31

## 2019-08-01 RX ADMIN — NICARDIPINE HYDROCHLORIDE 75 MG/HR: 30 CAPSULE, EXTENDED RELEASE ORAL at 13:12

## 2019-08-01 RX ADMIN — Medication 50 MILLIGRAM(S): at 21:41

## 2019-08-01 RX ADMIN — NICARDIPINE HYDROCHLORIDE 75 MG/HR: 30 CAPSULE, EXTENDED RELEASE ORAL at 07:36

## 2019-08-01 RX ADMIN — Medication 2 MILLIGRAM(S): at 02:39

## 2019-08-01 RX ADMIN — Medication 40 MILLIEQUIVALENT(S): at 14:04

## 2019-08-01 RX ADMIN — NICARDIPINE HYDROCHLORIDE 75 MG/HR: 30 CAPSULE, EXTENDED RELEASE ORAL at 04:20

## 2019-08-01 RX ADMIN — Medication 10 MILLIGRAM(S): at 20:16

## 2019-08-01 RX ADMIN — HYDROMORPHONE HYDROCHLORIDE 0.5 MILLIGRAM(S): 2 INJECTION INTRAMUSCULAR; INTRAVENOUS; SUBCUTANEOUS at 02:19

## 2019-08-01 RX ADMIN — ENOXAPARIN SODIUM 40 MILLIGRAM(S): 100 INJECTION SUBCUTANEOUS at 13:12

## 2019-08-01 RX ADMIN — CHLORHEXIDINE GLUCONATE 1 APPLICATION(S): 213 SOLUTION TOPICAL at 13:14

## 2019-08-01 NOTE — CONSULT NOTE ADULT - ATTENDING COMMENTS
Seen and examined.      Admitted to SICU with hypertensive emergency.  H/A blurry vision resolved now with improved BP control.  Requiring cardene drip for BP control at this time.  Goal -160, DBP . Increasing oral agents, a-blockade.  Oral calcium channel blocker to aid weaning of Cardene.  24 hr collection of urine for metanephrons ongoing.  Pt has had no symptoms of uti.  will stop abx.  Risk out weigh potential benefits.    35 minutes of critical care time spent providing medical care for patient's acute illness/conditions that impairs at least one vital organ system and/or poses a high risk of imminent or life threatening deterioration in the patient's condition. It includes time spent evaluating and treating the patient's acute illness as well as time spent reviewing labs, radiology, discussing goals of care with patient and/or patient's family, and discussing the case with a multidisciplinary team in an effort to prevent further life threatening deterioration or end organ damage. This time is independent of any procedures performed.
Patient seen and examined by me.  I have discussed my recommendation with the PA which are outlined above.  Will follow.

## 2019-08-01 NOTE — CONSULT NOTE ADULT - ASSESSMENT
28yo male w/ refractory hypertension now w/ hypertensive urgency thought to be 2/2 pheochromocytoma.     Neuro:  - Cont neurochecks and monitor for focal deficits  - supportive care for headache    Pulm:   - encourage IS, OOB    CVS:   - pt w/ cardiomyopathy on prior echo, f/u 7/31 echo read  - Cont. Doxasosin 4mg QHS, Hydralazine 10mg IV q6h PRN, and Nicardipine Gtt to achieve goal sbp < 160  - will refrain from beta blockers at this time as would likely interfere w/ metanephrine levels  - appreciate cardiology recs.     FEN/GI  - Diet, DASH/TLC: Sodium & Cholesterol Restricted (07-30-19 @ 07:29)    :   - monitor uop  - f/u urine metanephrine and cortisol levels  - appreciate nephro recs  - cont tx for uti  Endo:   - appreciate endo recs  - cont workup for MEN    Heme/ID  - cont rocephin through 8/4 for tx of UTI  - discuss restarting dvt ppx when bp controlled    dispo:   remains in need of SICU level of care.

## 2019-08-01 NOTE — PROGRESS NOTE ADULT - SUBJECTIVE AND OBJECTIVE BOX
[facility]                                                        Office: [address] [address]                                                       Telephone: [phone]. Fax:[phone]                                                                             PROGRESS NOTE   Reason for follow up: Uncontrolled HTN, Adrenal mass on MRI concerning for pheochromocytoma                               Overnight: Patient with hypertensive urgency with worsening headache overnight due to multiple medication changes. Patient ended up in the SICU on a nicardipine gtt for BP control     Update: Patient is feeling better today, no longer experiencing a headache. Patient wants to know what the plan is from a surgical standpoint, currently collecting a 24 hour urine.     Subjective: "I feel fine."   Complains of: headache overnight   Review of symptoms: Cardiac:  No chest pain. No dyspnea. No palpitations.  Respiratory:no cough. No dyspnea  Gastrointestinal: No diarrhea. No abdominal pain. No bleeding.     Past medical history: No updates.   	  Vital Signs Last 24 Hrs  T(C): 36.9 (01 Aug 2019 08:00), Max: 37.1 (31 Jul 2019 15:22)  T(F): 98.4 (01 Aug 2019 08:00), Max: 98.7 (31 Jul 2019 15:22)  HR: 115 (01 Aug 2019 11:00) (81 - 115)  BP: 148/90 (01 Aug 2019 11:00) (119/56 - 198/111)  BP(mean): 113 (01 Aug 2019 11:00) (81 - 126)  RR: 26 (01 Aug 2019 11:00) (17 - 34)  SpO2: 100% (01 Aug 2019 11:00) (92% - 100%)    Weight (kg): 136.1 (07-29 @ 18:32)      PHYSICAL EXAM:  Constitutional: Comfortable . No acute distress.   HEENT: Atraumatic and normcephalic , neck is supple . no JVD. Unremarkable  CNS: Alert and awake, Grossly nonfocal.  Lymph Nodes: Cervical : Not palpable.  Respiratory: Bilateral clear breath sounds.  Cardiovascular: Normal S1S2. . No murmur/rubs or gallop.  Gastrointestinal: Soft non-tender and non distended . +Bowel sounds.   Extremities: No leg edema.   Psychiatric: Calm . no agitation.  Skin: No skin rash.    MEDICATIONS  (STANDING):  amLODIPine   Tablet 10 milliGRAM(s) Oral daily  chlorhexidine 2% Cloths 1 Application(s) Topical daily  doxazosin 4 milliGRAM(s) Oral at bedtime  enoxaparin Injectable 40 milliGRAM(s) SubCutaneous daily  hydrALAZINE 50 milliGRAM(s) Oral every 8 hours  hydrochlorothiazide 12.5 milliGRAM(s) Oral daily  niCARdipine 30 milliGRAM(s) Oral two times a day  niCARdipine Infusion 15 mG/Hr (75 mL/Hr) IV Continuous <Continuous>  potassium chloride    Tablet ER 40 milliEquivalent(s) Oral every 4 hours  saccharomyces boulardii 250 milliGRAM(s) Oral two times a day    MEDICATIONS  (PRN):  acetaminophen   Tablet .. 650 milliGRAM(s) Oral every 6 hours PRN Temp greater or equal to 38C (100.4F), Mild Pain (1 - 3)  hydrALAZINE Injectable 10 milliGRAM(s) IV Push every 6 hours PRN for sbp above 160 mmhg      LABS:	 	                          14.4   9.14  )-----------( 243      ( 31 Jul 2019 05:49 )             39.4      08-01    135  |  98  |  12.0  ----------------------------<  103  3.4<L>   |  26.0  |  1.01    Ca    9.5      01 Aug 2019 07:04  Phos  4.4     08-01  Mg     2.4     08-01    TPro  7.2  /  Alb  4.2  /  TBili  0.6  /  DBili  x   /  AST  24  /  ALT  28  /  AlkPhos  48  07-31      TELEMETRY: Reviewed  SR      DIAGNOSTIC TESTING:  [ ] Echocardiogram:   < from: TTE Echo Complete w/Doppler (07.31.19 @ 17:54) >  PHYSICIAN INTERPRETATION:  Left Ventricle: The left ventricular internal cavity size is normal.  Global LV systolic function was hyperdynamic. Left ventricular ejection   fraction, by visual estimation, is 70 to 75%. Spectral Doppler shows   impaired relaxation pattern of left ventricular myocardial filling (Grade   I diastolic dysfunction).  Right Ventricle: Normal right ventricular size and function. TV S' 0.2   m/s.  Left Atrium: The left atrium is normal in size.  Right Atrium: The right atrium is normal in size.  Pericardium: There is no evidence of pericardial effusion.  Mitral Valve: Structurally normal mitral valve, with normal leaflet   excursion.  Tricuspid Valve: The tricuspid valve is normal in structure. Mild   tricuspid regurgitation is visualized.  Aortic Valve: Normal trileaflet aortic valve with normal opening. The   aortic valve is trileaflet. Peak transaortic gradient equals 9.0 mmHg,   mean transaortic gradient equals 5.0 mmHg, the calculated aortic valve   area equals 3.20 cm² by the continuity equation consistent with normally   opening aortic valve.  Pulmonic Valve: Structurally normal pulmonic valve, with normal leaflet   excursion.  Aorta: The aortic root is normal in size and structure.  Pulmonary Artery: The main pulmonary artery is normal in size.  Venous: A normal flow pattern is recorded from the right upper pulmonary   vein. The inferior vena cava is normal. The inferior vena cava was normal   sized, with respiratory size variation greater than 50%.       Summary:   1. There is moderate concentric left ventricular hypertrophy.   2. Hyperdynamic global left ventricular systolic function.   3. Left ventricular ejection fraction, by visual estimation, is 70 to   75%.   4. Spectral Doppler shows impaired relaxation pattern of left   ventricular myocardial filling (Grade I diastolic dysfunction).   5. Normal right ventricular size and function.   6. The left atrium is normal in size.   7. The right atrium is normal in size.   8. Structurally normal mitral valve, with normal leaflet excursion.   9. Normal trileaflet aortic valve with normal opening.  10. There is no evidence of pericardial effusion.    MD Roxana Electronically signed on 8/1/2019 at 8:56:03 AM    < end of copied text >         < end of copied text >    < from: MR Abdomen w/ IV Cont (07.30.19 @ 18:52) >  FINDINGS:    LOWER CHEST: Clear.    LIVER: Normal.  BILE DUCTS: Nondilated.  GALLBLADDER: Normal.  SPLEEN: Normal.  PANCREAS: Normal.  ADRENALS: Questionable slight nodular thickening of the left adrenal   gland measuring up to 1.2 cm (4; 22).  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    VISUALIZED PORTIONS:    BOWEL: No bowel-related abnormality.  PERITONEUM: No ascites.  VESSELS:  Normal caliber aorta.  RETROPERITONEUM: No adenopathy or mass.    ABDOMINAL WALL: Normal.  BONES: No aggressive lesion.    IMPRESSION:    Questionable slight nodular thickening of the left adrenal gland   measuring up to 1.2 cm.     No other retroperitoneal mass to suggest extra adrenal paraganglioma.      < end of copied text >

## 2019-08-01 NOTE — PROGRESS NOTE ADULT - ASSESSMENT
29 year old male presenting with diaphoresis, intermittent HA, urinary frequency, severely elevated BP and intermittent tachycardia likely 2/2 Pheochromocytoma.     Plan:  Continue SICU management of BP  US Neck to eval thyroid and parathyroid  Urine normetanephrines and metanephrines  Serum normetanephrines and metanephrines  Calcitonin  PTH  Calcium  Aldosterone  VMA Urine  24 hour cortisol  Doxazosin 1 mg q daily for HTN with increased dosage every 1-2 days as needed for BP control  monitor vital signs  Rocephin for UTI

## 2019-08-01 NOTE — PROGRESS NOTE ADULT - ASSESSMENT
A/P: 28 y/o M with a PMHx of uncontrolled HTN and obesity who presented to the ED with left finger numbness/tingling. Patient states that when he woke up yesterday he was having numbness and tingling in his left hand 2-4 digits, and the symptoms didn't improve throughout the day. Patient went to an urgent care to be evaluated, and was found to have a /109 so he was sent to the ED for management. Patient states that he has been taking all of his blood pressure medications, but hasn't followed up with a Cardiologist. Patient was asymptomatic other then the finger numbness and tingling, denies chest pain, SOB, orthopnea, PND, LE edema, headache, vision changes, or weakness. Patient states that he quit smoking since his last ER visit, but didn't follow up with anyone. Patient denies fevers, chills, CP, SOB, palpitations, syncope, near syncope, abdominal pain, N/V/D, headache, or dizziness.     1. Uncontrolled Hypertension  - Elevated urine metanephrines on last UA, currently having 24 hour urine collection  - Now with adrenal mass noted on MR Abdomen concerning for pheochromocytoma  - Currently on cardene gtt, titrate off  - Agree with Doxazosin 4mg QHS, PO nicardipine, hydralazine, and HCTZ  - Surgical team evaluating the patient       2. Obesity  - Discussed importance of weight loss to help blood pressure and all around health

## 2019-08-01 NOTE — PROGRESS NOTE ADULT - SUBJECTIVE AND OBJECTIVE BOX
SUBJECTIVE: Patient had blood pressure issues throughout the night and therefore was transferred to the SICU for a cardene gtt. Otherwise this, no new complaints. Tolerating a diet, denies nausea/vomiting. Voiding at baseline.       MEDICATIONS  (STANDING):  cefTRIAXone   IVPB 1000 milliGRAM(s) IV Intermittent every 24 hours  doxazosin 2 milliGRAM(s) Oral at bedtime  enoxaparin Injectable 40 milliGRAM(s) SubCutaneous daily  niCARdipine Infusion 5 mG/Hr (25 mL/Hr) IV Continuous <Continuous>  saccharomyces boulardii 250 milliGRAM(s) Oral two times a day    MEDICATIONS  (PRN):  acetaminophen   Tablet .. 650 milliGRAM(s) Oral every 6 hours PRN Temp greater or equal to 38C (100.4F), Mild Pain (1 - 3)  hydrALAZINE Injectable 10 milliGRAM(s) IV Push every 6 hours PRN for sbp above 160 mmhg      Vital Signs Last 24 Hrs  T(C): 36.4 (2019 18:57), Max: 37.1 (2019 15:22)  T(F): 97.5 (2019 18:57), Max: 98.7 (2019 15:22)  HR: 98 (2019 22:49) (77 - 103)  BP: 182/105 (2019 22:49) (125/70 - 184/116)  BP(mean): --  RR: 18 (2019 22:49) (18 - 18)  SpO2: 98% (2019 22:49) (97% - 99%)    PE  Constitutional: NAD, Obese  Neck: No JVD, FROM without pain  Respiratory: Respirations non-labored, no accessory muscle use  Cardiovascular: Regular rate & rhythm  Gastrointestinal: Soft, non-tender, non-distended  Extremities: No peripheral edema, No cyanosis  Neurological: A&O x 3; without gross deficit  Musculoskeletal: No joint pain, swelling, deformity, or point tenderness; no limitation of movement        I&O's Detail      LABS:                        14.4   9.14  )-----------( 243      ( 2019 05:49 )             39.4     07-31    136  |  100  |  12.0  ----------------------------<  107  3.4<L>   |  26.0  |  1.09    Ca    9.3      2019 05:49    TPro  7.2  /  Alb  4.2  /  TBili  0.6  /  DBili  x   /  AST  24  /  ALT  28  /  AlkPhos  48  07-31      Urinalysis Basic - ( 2019 11:24 )    Color: Yellow / Appearance: Clear / S.010 / pH: x  Gluc: x / Ketone: Negative  / Bili: Negative / Urobili: Negative mg/dL   Blood: x / Protein: Negative mg/dL / Nitrite: Positive   Leuk Esterase: Small / RBC: 0-2 /HPF / WBC 11-25   Sq Epi: x / Non Sq Epi: Moderate / Bacteria: Moderate        RADIOLOGY & ADDITIONAL STUDIES:

## 2019-08-01 NOTE — CONSULT NOTE ADULT - SUBJECTIVE AND OBJECTIVE BOX
SICU Admit Note    HPI:     28yo M former smoker with PMHx of obesity, refractory hypertension, hypertensive crisis resulting in seizure (3/2019), initially admitted to medicine after presenting on 19 w/ hypertension and headache. Patient states he has had long standing hx of episodes of diaphoresis, sweaty palms and HA's.  Recently, his symptoms have progressed to include left hand 2-4 digits tingling and discoloration associated w/  headache and increased urinary frequency. Heenies fever, chills, dysuria, blurry visions, cp, sob, or weakness. Pt has poorly controlled HTN. His home regimen includes labetalol, hydralazine and amlodipine and he states he takes his medications as prescribed. In 2019, pt was admitted to [facility] s/p seizure. This was suspected to be 2/2 htn as there was no focal lesion on EEG or MRI. During that stay, pt had elevated plasma metanephrine level. Renal artery duplex was negative for any stenosis. MRI abdomen this admision concerning for 1.2 cm left adrenal gland lesion. In addition, patient's urinalysis showed + nitrate and LE so he was started on abx for UTI.  He admits to smoking 1/2 PPD for last 10 years, but quit 4 months ago.  He denies any significiant surgical history or family history of cancer/tumors.       PMH:   HTN (hypertension) [Active]  Bacteriuria [Active]: Bacteriuria  Need for prophylactic measure [Active]: Need for prophylactic measure  Secondary hypertension [Active]: Secondary hypertension  Adrenal mass [Active]: Adrenal mass      PSH:   No significant past surgical history ([id number]) [Active]      Home Medications:  acetaminophen 325 mg oral tablet: 2 tab(s) orally every 6 hours, As needed, Mild Pain (1 - 3)  amLODIPine 10 mg oral tablet: 1 tab(s) orally once a day   hydrALAZINE 25 mg oral tablet: 1 tab(s) orally every 8 hours  labetalol 300 mg oral tablet: 1 tab(s) orally 3 times a day      ICU Vital Signs Last 24 Hrs  T(C): 36.9 (01 Aug 2019 01:35), Max: 37.1 (2019 15:22)  T(F): 98.5 (01 Aug 2019 01:35), Max: 98.7 (2019 15:22)  HR: 102 (01 Aug 2019 01:45) (77 - 103)  BP: 178/93 (01 Aug 2019 01:45) (125/70 - 198/111)  BP(mean): 126 (01 Aug 2019 01:45) (126 - 126)  ABP: --  ABP(mean): --  RR: 18 (01 Aug 2019 01:45) (18 - 18)  SpO2: 97% (01 Aug 2019 01:45) (96% - 99%)      I&O's Detail            MEDICATIONS  (STANDING):  cefTRIAXone   IVPB 1000 milliGRAM(s) IV Intermittent every 24 hours  doxazosin 2 milliGRAM(s) Oral at bedtime  enoxaparin Injectable 40 milliGRAM(s) SubCutaneous daily  HYDROmorphone  Injectable 0.5 milliGRAM(s) IV Push once  niCARdipine Infusion 5 mG/Hr (25 mL/Hr) IV Continuous <Continuous>  saccharomyces boulardii 250 milliGRAM(s) Oral two times a day    MEDICATIONS  (PRN):  acetaminophen   Tablet .. 650 milliGRAM(s) Oral every 6 hours PRN Temp greater or equal to 38C (100.4F), Mild Pain (1 - 3)  hydrALAZINE Injectable 10 milliGRAM(s) IV Push every 6 hours PRN for sbp above 160 mmhg        PHYSICAL EXAM:    Gen: obese young male, NAD    Eyes: EOMI, PERRLA    Neurological: AOx4, GCS 15, no focal deficits    Neck: trachea midline, mild acanthosis nigricans    Pulmonary: CTABL    Cardiovascular: Sinus tachycardia, widened apical impulse    Gastrointestinal: obese. Soft, NT, ND    Genitourinary: voiding spontaneously    Extremities: 2+ peripheral pulses    Skin: no significant lesions        LABS:  CBC Full  -  ( 2019 05:49 )  WBC Count : 9.14 K/uL  RBC Count : 4.61 M/uL  Hemoglobin : 14.4 g/dL  Hematocrit : 39.4 %  Platelet Count - Automated : 243 K/uL  Mean Cell Volume : 85.5 fl  Mean Cell Hemoglobin : 31.2 pg  Mean Cell Hemoglobin Concentration : 36.5 gm/dL  Auto Neutrophil # : x  Auto Lymphocyte # : x  Auto Monocyte # : x  Auto Eosinophil # : x  Auto Basophil # : x  Auto Neutrophil % : x  Auto Lymphocyte % : x  Auto Monocyte % : x  Auto Eosinophil % : x  Auto Basophil % : x    07-31    136  |  100  |  12.0  ----------------------------<  107  3.4<L>   |  26.0  |  1.09    Ca    9.3      2019 05:49    TPro  7.2  /  Alb  4.2  /  TBili  0.6  /  DBili  x   /  AST  24  /  ALT  28  /  AlkPhos  48  07-31      Urinalysis Basic - ( 2019 11:24 )    Color: Yellow / Appearance: Clear / S.010 / pH: x  Gluc: x / Ketone: Negative  / Bili: Negative / Urobili: Negative mg/dL   Blood: x / Protein: Negative mg/dL / Nitrite: Positive   Leuk Esterase: Small / RBC: 0-2 /HPF / WBC 11-25   Sq Epi: x / Non Sq Epi: Moderate / Bacteria: Moderate      RECENT CULTURES:      LIVER FUNCTIONS - ( 2019 05:49 )  Alb: 4.2 g/dL / Pro: 7.2 g/dL / ALK PHOS: 48 U/L / ALT: 28 U/L / AST: 24 U/L / GGT: x               CAPILLARY BLOOD GLUCOSE      RADIOLOGY & ADDITIONAL STUDIES:

## 2019-08-02 LAB
ALDOST SERPL-MCNC: 9.7 NG/DL — SIGNIFICANT CHANGE UP
ANION GAP SERPL CALC-SCNC: 11 MMOL/L — SIGNIFICANT CHANGE UP (ref 5–17)
BASOPHILS # BLD AUTO: 0.05 K/UL — SIGNIFICANT CHANGE UP (ref 0–0.2)
BASOPHILS NFR BLD AUTO: 0.6 % — SIGNIFICANT CHANGE UP (ref 0–2)
BUN SERPL-MCNC: 13 MG/DL — SIGNIFICANT CHANGE UP (ref 8–20)
CALCIUM SERPL-MCNC: 9.8 MG/DL — SIGNIFICANT CHANGE UP (ref 8.6–10.2)
CHLORIDE SERPL-SCNC: 101 MMOL/L — SIGNIFICANT CHANGE UP (ref 98–107)
CO2 SERPL-SCNC: 25 MMOL/L — SIGNIFICANT CHANGE UP (ref 22–29)
CREAT SERPL-MCNC: 1.02 MG/DL — SIGNIFICANT CHANGE UP (ref 0.5–1.3)
EOSINOPHIL # BLD AUTO: 0.19 K/UL — SIGNIFICANT CHANGE UP (ref 0–0.5)
EOSINOPHIL NFR BLD AUTO: 2.1 % — SIGNIFICANT CHANGE UP (ref 0–6)
GLUCOSE SERPL-MCNC: 105 MG/DL — SIGNIFICANT CHANGE UP (ref 70–115)
HCT VFR BLD CALC: 41 % — SIGNIFICANT CHANGE UP (ref 39–50)
HGB BLD-MCNC: 15 G/DL — SIGNIFICANT CHANGE UP (ref 13–17)
IMM GRANULOCYTES NFR BLD AUTO: 0.4 % — SIGNIFICANT CHANGE UP (ref 0–1.5)
LYMPHOCYTES # BLD AUTO: 2.32 K/UL — SIGNIFICANT CHANGE UP (ref 1–3.3)
LYMPHOCYTES # BLD AUTO: 25.9 % — SIGNIFICANT CHANGE UP (ref 13–44)
MAGNESIUM SERPL-MCNC: 2.3 MG/DL — SIGNIFICANT CHANGE UP (ref 1.6–2.6)
MCHC RBC-ENTMCNC: 31.4 PG — SIGNIFICANT CHANGE UP (ref 27–34)
MCHC RBC-ENTMCNC: 36.6 GM/DL — HIGH (ref 32–36)
MCV RBC AUTO: 85.8 FL — SIGNIFICANT CHANGE UP (ref 80–100)
MONOCYTES # BLD AUTO: 0.92 K/UL — HIGH (ref 0–0.9)
MONOCYTES NFR BLD AUTO: 10.3 % — SIGNIFICANT CHANGE UP (ref 2–14)
NEUTROPHILS # BLD AUTO: 5.43 K/UL — SIGNIFICANT CHANGE UP (ref 1.8–7.4)
NEUTROPHILS NFR BLD AUTO: 60.7 % — SIGNIFICANT CHANGE UP (ref 43–77)
PLATELET # BLD AUTO: 257 K/UL — SIGNIFICANT CHANGE UP (ref 150–400)
POTASSIUM SERPL-MCNC: 3.9 MMOL/L — SIGNIFICANT CHANGE UP (ref 3.5–5.3)
POTASSIUM SERPL-SCNC: 3.9 MMOL/L — SIGNIFICANT CHANGE UP (ref 3.5–5.3)
RBC # BLD: 4.78 M/UL — SIGNIFICANT CHANGE UP (ref 4.2–5.8)
RBC # FLD: 11.8 % — SIGNIFICANT CHANGE UP (ref 10.3–14.5)
RENIN DIRECT, PLASMA: 10 PG/ML — SIGNIFICANT CHANGE UP
SODIUM SERPL-SCNC: 137 MMOL/L — SIGNIFICANT CHANGE UP (ref 135–145)
WBC # BLD: 8.95 K/UL — SIGNIFICANT CHANGE UP (ref 3.8–10.5)
WBC # FLD AUTO: 8.95 K/UL — SIGNIFICANT CHANGE UP (ref 3.8–10.5)

## 2019-08-02 PROCEDURE — 99233 SBSQ HOSP IP/OBS HIGH 50: CPT

## 2019-08-02 PROCEDURE — 99232 SBSQ HOSP IP/OBS MODERATE 35: CPT

## 2019-08-02 RX ORDER — DOXAZOSIN MESYLATE 4 MG
8 TABLET ORAL AT BEDTIME
Refills: 0 | Status: DISCONTINUED | OUTPATIENT
Start: 2019-08-02 | End: 2019-08-03

## 2019-08-02 RX ORDER — DOXAZOSIN MESYLATE 4 MG
2 TABLET ORAL ONCE
Refills: 0 | Status: COMPLETED | OUTPATIENT
Start: 2019-08-02 | End: 2019-08-02

## 2019-08-02 RX ORDER — DOXAZOSIN MESYLATE 4 MG
8 TABLET ORAL AT BEDTIME
Refills: 0 | Status: DISCONTINUED | OUTPATIENT
Start: 2019-08-02 | End: 2019-08-02

## 2019-08-02 RX ORDER — DOXAZOSIN MESYLATE 4 MG
6 TABLET ORAL AT BEDTIME
Refills: 0 | Status: DISCONTINUED | OUTPATIENT
Start: 2019-08-02 | End: 2019-08-02

## 2019-08-02 RX ADMIN — AMLODIPINE BESYLATE 10 MILLIGRAM(S): 2.5 TABLET ORAL at 05:58

## 2019-08-02 RX ADMIN — Medication 250 MILLIGRAM(S): at 05:58

## 2019-08-02 RX ADMIN — CHLORHEXIDINE GLUCONATE 1 APPLICATION(S): 213 SOLUTION TOPICAL at 11:59

## 2019-08-02 RX ADMIN — Medication 2 MILLIGRAM(S): at 20:00

## 2019-08-02 RX ADMIN — Medication 50 MILLIGRAM(S): at 21:12

## 2019-08-02 RX ADMIN — Medication 2 MILLIGRAM(S): at 06:29

## 2019-08-02 RX ADMIN — Medication 50 MILLIGRAM(S): at 13:03

## 2019-08-02 RX ADMIN — Medication 12.5 MILLIGRAM(S): at 05:59

## 2019-08-02 RX ADMIN — Medication 50 MILLIGRAM(S): at 05:59

## 2019-08-02 RX ADMIN — ENOXAPARIN SODIUM 40 MILLIGRAM(S): 100 INJECTION SUBCUTANEOUS at 11:59

## 2019-08-02 RX ADMIN — Medication 8 MILLIGRAM(S): at 21:12

## 2019-08-02 RX ADMIN — Medication 250 MILLIGRAM(S): at 17:02

## 2019-08-02 NOTE — PROGRESS NOTE ADULT - ASSESSMENT
29 year old male presenting with diaphoresis, intermittent HA, urinary frequency, severely elevated BP and intermittent tachycardia likely 2/2 Pheochromocytoma.     Plan:  Continue SICU management of BP  will f/u lab work-up once resulted  Doxazosin 1 mg q daily for HTN with increased dosage every 1-2 days as needed for BP control  monitor vital signs  Rocephin for UTI

## 2019-08-02 NOTE — PROGRESS NOTE ADULT - ASSESSMENT
A/P: 28 y/o M with a PMHx of uncontrolled HTN and obesity who presented to the ED with left finger numbness/tingling. Patient states that when he woke up yesterday he was having numbness and tingling in his left hand 2-4 digits, and the symptoms didn't improve throughout the day. Patient went to an urgent care to be evaluated, and was found to have a /109 so he was sent to the ED for management. Patient states that he has been taking all of his blood pressure medications, but hasn't followed up with a Cardiologist. Patient was asymptomatic other then the finger numbness and tingling, denies chest pain, SOB, orthopnea, PND, LE edema, headache, vision changes, or weakness. Patient states that he quit smoking since his last ER visit, but didn't follow up with anyone. Patient denies fevers, chills, CP, SOB, palpitations, syncope, near syncope, abdominal pain, N/V/D, headache, or dizziness.     1. Uncontrolled Hypertension  - Elevated urine metanephrines on last UA, currently having 24 hour urine collection  - Now with adrenal mass noted on MR Abdomen concerning for pheochromocytoma  - Doxazonsin increased to 6mg QHS  - Continue PO nicardipine, hydralazine, and HCTZ  - Surgical team evaluating the patient   - Will sign off at this time, blood pressure being managed by multiple teams. Feel free to call back if Cardiology input is needed further.      2. Obesity  - Discussed importance of weight loss to help blood pressure and all around health A/P: 28 y/o M with a PMHx of uncontrolled HTN and obesity who presented to the ED with left finger numbness/tingling. Patient states that when he woke up yesterday he was having numbness and tingling in his left hand 2-4 digits, and the symptoms didn't improve throughout the day. Patient went to an urgent care to be evaluated, and was found to have a /109 so he was sent to the ED for management. Patient states that he has been taking all of his blood pressure medications, but hasn't followed up with a Cardiologist. Patient was asymptomatic other then the finger numbness and tingling, denies chest pain, SOB, orthopnea, PND, LE edema, headache, vision changes, or weakness. Patient states that he quit smoking since his last ER visit, but didn't follow up with anyone. Patient denies fevers, chills, CP, SOB, palpitations, syncope, near syncope, abdominal pain, N/V/D, headache, or dizziness.     1. Uncontrolled Hypertension  - Elevated urine metanephrines on last UA, currently having 24 hour urine collection  - Now with adrenal mass noted on MR Abdomen concerning for pheochromocytoma  - Doxazonsin increased to 6mg QHS  - Continue PO nicardipine, hydralazine, and HCTZ  - Surgical team evaluating the patient   - Will sign off at this time, blood pressure being managed by multiple teams. Feel free to call back if Cardiology input is needed further.      2. Obesity  - Discussed importance of weight loss to help blood pressure and all around health    Will sign off.  Reconsult if you have any questions

## 2019-08-02 NOTE — PROGRESS NOTE ADULT - SUBJECTIVE AND OBJECTIVE BOX
INTERVAL HPI/OVERNIGHT EVENTS:    PO antihypertensives restarted and Cardene titrated down.  No UTI, gross contaminant, Rocephin d/c.  ON:  Weaned off cardene gtt but now BP creeping up.  Doxazosin increased to 6 mg.  PRN hydralazine dosing frequency increased.  PT remains asymptomatic overnight.  Echo with moderate LVH.      MEDICATIONS  (STANDING):  amLODIPine   Tablet 10 milliGRAM(s) Oral daily  chlorhexidine 2% Cloths 1 Application(s) Topical daily  enoxaparin Injectable 40 milliGRAM(s) SubCutaneous daily  hydrALAZINE 50 milliGRAM(s) Oral every 8 hours  hydrochlorothiazide 12.5 milliGRAM(s) Oral daily  niCARdipine Infusion 15 mG/Hr (75 mL/Hr) IV Continuous <Continuous>  saccharomyces boulardii 250 milliGRAM(s) Oral two times a day    MEDICATIONS  (PRN):  acetaminophen   Tablet .. 650 milliGRAM(s) Oral every 6 hours PRN Temp greater or equal to 38C (100.4F), Mild Pain (1 - 3)  hydrALAZINE Injectable 10 milliGRAM(s) IV Push every 4 hours PRN for sbp above 160 mmhg      Drug Dosing Weight  Height (cm): 180.34 (29 Jul 2019 18:32)  Weight (kg): 140.4 (01 Aug 2019 08:00)  BMI (kg/m2): 43.2 (01 Aug 2019 08:00)  BSA (m2): 2.54 (01 Aug 2019 08:00)      PAST MEDICAL & SURGICAL HISTORY:  HTN (hypertension)  No significant past surgical history      ICU Vital Signs Last 24 Hrs  T(C): 37 (02 Aug 2019 04:00), Max: 37.1 (01 Aug 2019 16:00)  T(F): 98.6 (02 Aug 2019 04:00), Max: 98.7 (01 Aug 2019 16:00)  HR: 89 (02 Aug 2019 04:00) (85 - 115)  BP: 139/63 (02 Aug 2019 04:00) (118/57 - 209/95)  BP(mean): 90 (02 Aug 2019 04:00) (75 - 136)  ABP: --  ABP(mean): --  RR: 20 (02 Aug 2019 04:00) (3 - 38)  SpO2: 94% (02 Aug 2019 04:00) (90% - 100%)          I&O's Detail    31 Jul 2019 07:01  -  01 Aug 2019 07:00  --------------------------------------------------------  IN:    niCARdipine Infusion: 275 mL    niCARdipine Infusion: 150 mL  Total IN: 425 mL    OUT:    Voided: 500 mL  Total OUT: 500 mL    Total NET: -75 mL      01 Aug 2019 07:01  -  02 Aug 2019 06:11  --------------------------------------------------------  IN:    niCARdipine Infusion: 87.5 mL    niCARdipine Infusion: 475 mL  Total IN: 562.5 mL    OUT:    Voided: 2300 mL  Total OUT: 2300 mL    Total NET: -1737.5 mL        Physical Exam:    Neurological:  No sensory/motor deficits    HEENT: PERRLA, EOMI, no drainage or redness    Neck: No bruits; no thyromegaly or nodules,  No JVD    Back: Normal spine flexure, No CVA tenderness, No deformity or limitation of movement    Respiratory: Breath Sounds equal & clear to auscultation, no accessory muscle use    Cardiovascular: Regular rate & rhythm, normal S1, S2; no murmurs, gallops or rubs    Gastrointestinal: Soft, non-tender, normal bowel sounds    Extremities: No peripheral edema, No cyanosis, clubbing     Vascular: Equal and normal pulses: 2+ peripheral pulses throughout    Skin: No rashes        LABS:    08-01    135  |  98  |  12.0  ----------------------------<  103  3.4<L>   |  26.0  |  1.01    Ca    9.5      01 Aug 2019 07:04  Phos  4.4     08-01  Mg     2.4     08-01

## 2019-08-02 NOTE — PROGRESS NOTE ADULT - SUBJECTIVE AND OBJECTIVE BOX
HPI/OVERNIGHT EVENTS: Patient seen and examined at bedside this AM. Patient weaned off cardene gtt overnight, patient has no complaints. BP taken at bedside was 179/86, nurse about to give another Cardura dose of 2mg. Denies headaches, palpitations, blurry vision, fever, chills, nausea, vomitting, chest pain, SOB, dizziness, abd pain or any other concerning symptoms.    Vital Signs Last 24 Hrs  T(C): 37 (02 Aug 2019 04:00), Max: 37.1 (01 Aug 2019 16:00)  T(F): 98.6 (02 Aug 2019 04:00), Max: 98.7 (01 Aug 2019 16:00)  HR: 93 (02 Aug 2019 07:00) (85 - 115)  BP: 188/109 (02 Aug 2019 06:00) (118/57 - 209/95)  BP(mean): 140 (02 Aug 2019 06:00) (75 - 140)  RR: 20 (02 Aug 2019 07:00) (3 - 38)  SpO2: 99% (02 Aug 2019 07:00) (90% - 100%)    I&O's Detail    01 Aug 2019 07:01  -  02 Aug 2019 07:00  --------------------------------------------------------  IN:    niCARdipine Infusion: 87.5 mL    niCARdipine Infusion: 475 mL  Total IN: 562.5 mL    OUT:    Voided: 2750 mL  Total OUT: 2750 mL    Total NET: -2187.5 mL              Constitutional: patient resting comfortably in bed, in no acute distress  HEENT: EOMI / PERRL b/l   Neck: No JVD, full ROM without pain  Respiratory: CTAB respirations are unlabored, no accessory muscle use, no conversational dyspnea  Cardiovascular: regular rate & rhythm   Gastrointestinal: Abdomen soft, non-tender, non-distended, no rebound tenderness / guarding  Neurological: GCS: 15 (4/5/6). A&O x 3; no gross sensory / motor / coordination deficits  Psychiatric: Normal mood, normal affect  Musculoskeletal: No joint pain, swelling or deformity; no limitation of movement    LABS:    08-01    135  |  98  |  12.0  ----------------------------<  103  3.4<L>   |  26.0  |  1.01    Ca    9.5      01 Aug 2019 07:04  Phos  4.4     08-01  Mg     2.4     08-01            MEDICATIONS  (STANDING):  amLODIPine   Tablet 10 milliGRAM(s) Oral daily  chlorhexidine 2% Cloths 1 Application(s) Topical daily  doxazosin 6 milliGRAM(s) Oral at bedtime  enoxaparin Injectable 40 milliGRAM(s) SubCutaneous daily  hydrALAZINE 50 milliGRAM(s) Oral every 8 hours  hydrochlorothiazide 12.5 milliGRAM(s) Oral daily  niCARdipine Infusion 15 mG/Hr (75 mL/Hr) IV Continuous <Continuous>  saccharomyces boulardii 250 milliGRAM(s) Oral two times a day    MEDICATIONS  (PRN):  acetaminophen   Tablet .. 650 milliGRAM(s) Oral every 6 hours PRN Temp greater or equal to 38C (100.4F), Mild Pain (1 - 3)  hydrALAZINE Injectable 10 milliGRAM(s) IV Push every 4 hours PRN for sbp above 160 mmhg

## 2019-08-02 NOTE — DIETITIAN INITIAL EVALUATION ADULT. - PROBLEM SELECTOR PLAN 3
with urinary frequency but no dysuria, flank pain   UA positive nitrite, small LE and mod epitheliale cells  will continue Ceftriaxone for now, can deescalate abx based on urine cx result

## 2019-08-02 NOTE — PROGRESS NOTE ADULT - SUBJECTIVE AND OBJECTIVE BOX
[facility]                                                        Office: [address] [address]                                                       Telephone: [phone]. Fax:[phone]                                                                             PROGRESS NOTE   Reason for follow up: Uncontrolled HTN, Adrenal mass on MRI concerning for pheochromocytoma                               Overnight: Patient's BP was well controlled overnight, still elevated this morning.     Update: Doxazosin increased to 6mg.     Subjective: "I'm tired."   Complains of: No complaints   Review of symptoms: Cardiac:  No chest pain. No dyspnea. No palpitations.  Respiratory:no cough. No dyspnea  Gastrointestinal: No diarrhea. No abdominal pain. No bleeding.     Past medical history: No updates.   	  Vital Signs Last 24 Hrs  T(C): 36.9 (02 Aug 2019 08:00), Max: 37.1 (01 Aug 2019 16:00)  T(F): 98.4 (02 Aug 2019 08:00), Max: 98.7 (01 Aug 2019 16:00)  HR: 89 (02 Aug 2019 08:00) (86 - 115)  BP: 158/73 (02 Aug 2019 08:00) (118/57 - 209/95)  BP(mean): 104 (02 Aug 2019 08:00) (75 - 140)  RR: 18 (02 Aug 2019 08:00) (18 - 38)  SpO2: 100% (02 Aug 2019 08:00) (90% - 100%)    Weight (kg): 136.1 (07-29 @ 18:32)      PHYSICAL EXAM:  Constitutional: Comfortable . No acute distress.   HEENT: Atraumatic and normcephalic , neck is supple . no JVD. Unremarkable  CNS: Alert and awake, Grossly nonfocal.  Lymph Nodes: Cervical : Not palpable.  Respiratory: Bilateral clear breath sounds.  Cardiovascular: Normal S1S2. . No murmur/rubs or gallop.  Gastrointestinal: Soft non-tender and non distended . +Bowel sounds.   Extremities: No leg edema.   Psychiatric: Calm . no agitation.  Skin: No skin rash.    MEDICATIONS  (STANDING):  amLODIPine   Tablet 10 milliGRAM(s) Oral daily  chlorhexidine 2% Cloths 1 Application(s) Topical daily  doxazosin 6 milliGRAM(s) Oral at bedtime  enoxaparin Injectable 40 milliGRAM(s) SubCutaneous daily  hydrALAZINE 50 milliGRAM(s) Oral every 8 hours  hydrochlorothiazide 12.5 milliGRAM(s) Oral daily  niCARdipine Infusion 15 mG/Hr (75 mL/Hr) IV Continuous <Continuous>  saccharomyces boulardii 250 milliGRAM(s) Oral two times a day    MEDICATIONS  (PRN):  acetaminophen   Tablet .. 650 milliGRAM(s) Oral every 6 hours PRN Temp greater or equal to 38C (100.4F), Mild Pain (1 - 3)  hydrALAZINE Injectable 10 milliGRAM(s) IV Push every 4 hours PRN for sbp above 160 mmhg      LABS:	 	                          15.0   8.95  )-----------( 257      ( 02 Aug 2019 06:26 )             41.0     08-02    137  |  101  |  13.0  ----------------------------<  105  3.9   |  25.0  |  1.02    Ca    9.8      02 Aug 2019 06:26  Phos  4.4     08-01  Mg     2.3     08-02          TELEMETRY: Reviewed  SR      DIAGNOSTIC TESTING:  [ ] Echocardiogram:   < from: TTE Echo Complete w/Doppler (07.31.19 @ 17:54) >  PHYSICIAN INTERPRETATION:  Left Ventricle: The left ventricular internal cavity size is normal.  Global LV systolic function was hyperdynamic. Left ventricular ejection   fraction, by visual estimation, is 70 to 75%. Spectral Doppler shows   impaired relaxation pattern of left ventricular myocardial filling (Grade   I diastolic dysfunction).  Right Ventricle: Normal right ventricular size and function. TV S' 0.2   m/s.  Left Atrium: The left atrium is normal in size.  Right Atrium: The right atrium is normal in size.  Pericardium: There is no evidence of pericardial effusion.  Mitral Valve: Structurally normal mitral valve, with normal leaflet   excursion.  Tricuspid Valve: The tricuspid valve is normal in structure. Mild   tricuspid regurgitation is visualized.  Aortic Valve: Normal trileaflet aortic valve with normal opening. The   aortic valve is trileaflet. Peak transaortic gradient equals 9.0 mmHg,   mean transaortic gradient equals 5.0 mmHg, the calculated aortic valve   area equals 3.20 cm² by the continuity equation consistent with normally   opening aortic valve.  Pulmonic Valve: Structurally normal pulmonic valve, with normal leaflet   excursion.  Aorta: The aortic root is normal in size and structure.  Pulmonary Artery: The main pulmonary artery is normal in size.  Venous: A normal flow pattern is recorded from the right upper pulmonary   vein. The inferior vena cava is normal. The inferior vena cava was normal   sized, with respiratory size variation greater than 50%.       Summary:   1. There is moderate concentric left ventricular hypertrophy.   2. Hyperdynamic global left ventricular systolic function.   3. Left ventricular ejection fraction, by visual estimation, is 70 to   75%.   4. Spectral Doppler shows impaired relaxation pattern of left   ventricular myocardial filling (Grade I diastolic dysfunction).   5. Normal right ventricular size and function.   6. The left atrium is normal in size.   7. The right atrium is normal in size.   8. Structurally normal mitral valve, with normal leaflet excursion.   9. Normal trileaflet aortic valve with normal opening.  10. There is no evidence of pericardial effusion.    MD Roxana Electronically signed on 8/1/2019 at 8:56:03 AM    < end of copied text >         < end of copied text >    < from: MR Abdomen w/ IV Cont (07.30.19 @ 18:52) >  FINDINGS:    LOWER CHEST: Clear.    LIVER: Normal.  BILE DUCTS: Nondilated.  GALLBLADDER: Normal.  SPLEEN: Normal.  PANCREAS: Normal.  ADRENALS: Questionable slight nodular thickening of the left adrenal   gland measuring up to 1.2 cm (4; 22).  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    VISUALIZED PORTIONS:    BOWEL: No bowel-related abnormality.  PERITONEUM: No ascites.  VESSELS:  Normal caliber aorta.  RETROPERITONEUM: No adenopathy or mass.    ABDOMINAL WALL: Normal.  BONES: No aggressive lesion.    IMPRESSION:    Questionable slight nodular thickening of the left adrenal gland   measuring up to 1.2 cm.     No other retroperitoneal mass to suggest extra adrenal paraganglioma.      < end of copied text >

## 2019-08-02 NOTE — DIETITIAN INITIAL EVALUATION ADULT. - PERTINENT LABORATORY DATA
08-02 Na137 mmol/L Glu 105 mg/dL K+ 3.9 mmol/L Cr  1.02 mg/dL BUN 13.0 mg/dL Phos n/a   Alb n/a   PAB n/a     n/a  HgbA1C

## 2019-08-02 NOTE — DIETITIAN INITIAL EVALUATION ADULT. - OTHER INFO
29 year old male presenting with diaphoresis, intermittent HA, urinary frequency, severely elevated BP and intermittent tachycardia likely 2/2 Pheochromocytoma. Pt reports eating well PTA, no recent wt changes. Reviewed and reinforced low Na+ diet guidelines. Food preferences obtained.

## 2019-08-02 NOTE — PROGRESS NOTE ADULT - ASSESSMENT
28yo male w/ refractory hypertension now w/ hypertensive urgency thought to be 2/2 pheochromocytoma.     Neuro:  - Cont neurochecks and monitor for focal deficits  - supportive care for headache    Pulm:   - encourage IS, OOB    CVS:   - Echo with LVH with preserved function  - Increase Doxazosin to 6 mg QHS, Hydralazine 10mg IV q4h PRN in addition to home PO dose, and Nicardipine Gtt to achieve goal sbp < 160  - will refrain from beta blockers at this time as would likely interfere w/ metanephrine levels  - appreciate cardiology recs.     FEN/GI  - Diet, DASH/TLC: Sodium & Cholesterol Restricted     :   - monitor uop  - f/u urine metanephrine and cortisol levels  - appreciate nephro recs  - cont tx for uti    Endo:   - appreciate endo recs  - cont workup for MEN    Heme/ID  - Discontinued rocephin through 8/4 for tx of UTI with contaminated specimen  - discuss restarting dvt ppx when bp controlled    dispo:   remains in need of SICU level of care. F/up OR plan

## 2019-08-03 LAB
ANION GAP SERPL CALC-SCNC: 11 MMOL/L — SIGNIFICANT CHANGE UP (ref 5–17)
BASOPHILS # BLD AUTO: 0.04 K/UL — SIGNIFICANT CHANGE UP (ref 0–0.2)
BASOPHILS NFR BLD AUTO: 0.5 % — SIGNIFICANT CHANGE UP (ref 0–2)
BUN SERPL-MCNC: 16 MG/DL — SIGNIFICANT CHANGE UP (ref 8–20)
CALCIUM SERPL-MCNC: 9.7 MG/DL — SIGNIFICANT CHANGE UP (ref 8.6–10.2)
CGA FLD-MCNC: 52 NG/ML — SIGNIFICANT CHANGE UP
CHLORIDE SERPL-SCNC: 100 MMOL/L — SIGNIFICANT CHANGE UP (ref 98–107)
CO2 SERPL-SCNC: 26 MMOL/L — SIGNIFICANT CHANGE UP (ref 22–29)
CREAT SERPL-MCNC: 1.16 MG/DL — SIGNIFICANT CHANGE UP (ref 0.5–1.3)
EOSINOPHIL # BLD AUTO: 0.17 K/UL — SIGNIFICANT CHANGE UP (ref 0–0.5)
EOSINOPHIL NFR BLD AUTO: 2.3 % — SIGNIFICANT CHANGE UP (ref 0–6)
GLUCOSE SERPL-MCNC: 108 MG/DL — SIGNIFICANT CHANGE UP (ref 70–115)
HCT VFR BLD CALC: 38.6 % — LOW (ref 39–50)
HGB BLD-MCNC: 14.4 G/DL — SIGNIFICANT CHANGE UP (ref 13–17)
IMM GRANULOCYTES NFR BLD AUTO: 0.4 % — SIGNIFICANT CHANGE UP (ref 0–1.5)
LYMPHOCYTES # BLD AUTO: 2.27 K/UL — SIGNIFICANT CHANGE UP (ref 1–3.3)
LYMPHOCYTES # BLD AUTO: 31.1 % — SIGNIFICANT CHANGE UP (ref 13–44)
MAGNESIUM SERPL-MCNC: 2.2 MG/DL — SIGNIFICANT CHANGE UP (ref 1.6–2.6)
MCHC RBC-ENTMCNC: 31.4 PG — SIGNIFICANT CHANGE UP (ref 27–34)
MCHC RBC-ENTMCNC: 37.3 GM/DL — HIGH (ref 32–36)
MCV RBC AUTO: 84.3 FL — SIGNIFICANT CHANGE UP (ref 80–100)
MONOCYTES # BLD AUTO: 0.76 K/UL — SIGNIFICANT CHANGE UP (ref 0–0.9)
MONOCYTES NFR BLD AUTO: 10.4 % — SIGNIFICANT CHANGE UP (ref 2–14)
NEUTROPHILS # BLD AUTO: 4.04 K/UL — SIGNIFICANT CHANGE UP (ref 1.8–7.4)
NEUTROPHILS NFR BLD AUTO: 55.3 % — SIGNIFICANT CHANGE UP (ref 43–77)
PHOSPHATE SERPL-MCNC: 4.8 MG/DL — HIGH (ref 2.4–4.7)
PLATELET # BLD AUTO: 231 K/UL — SIGNIFICANT CHANGE UP (ref 150–400)
POTASSIUM SERPL-MCNC: 3.7 MMOL/L — SIGNIFICANT CHANGE UP (ref 3.5–5.3)
POTASSIUM SERPL-SCNC: 3.7 MMOL/L — SIGNIFICANT CHANGE UP (ref 3.5–5.3)
RBC # BLD: 4.58 M/UL — SIGNIFICANT CHANGE UP (ref 4.2–5.8)
RBC # FLD: 11.8 % — SIGNIFICANT CHANGE UP (ref 10.3–14.5)
RENIN PLAS-CCNC: 0.79 NG/ML/HR — SIGNIFICANT CHANGE UP (ref 0.17–5.38)
SODIUM SERPL-SCNC: 137 MMOL/L — SIGNIFICANT CHANGE UP (ref 135–145)
WBC # BLD: 7.31 K/UL — SIGNIFICANT CHANGE UP (ref 3.8–10.5)
WBC # FLD AUTO: 7.31 K/UL — SIGNIFICANT CHANGE UP (ref 3.8–10.5)

## 2019-08-03 PROCEDURE — 99232 SBSQ HOSP IP/OBS MODERATE 35: CPT

## 2019-08-03 RX ORDER — ENOXAPARIN SODIUM 100 MG/ML
30 INJECTION SUBCUTANEOUS
Refills: 0 | Status: DISCONTINUED | OUTPATIENT
Start: 2019-08-03 | End: 2019-08-08

## 2019-08-03 RX ORDER — DOXAZOSIN MESYLATE 4 MG
10 TABLET ORAL AT BEDTIME
Refills: 0 | Status: DISCONTINUED | OUTPATIENT
Start: 2019-08-03 | End: 2019-08-04

## 2019-08-03 RX ORDER — DOXAZOSIN MESYLATE 4 MG
2 TABLET ORAL ONCE
Refills: 0 | Status: COMPLETED | OUTPATIENT
Start: 2019-08-03 | End: 2019-08-03

## 2019-08-03 RX ORDER — POTASSIUM CHLORIDE 20 MEQ
40 PACKET (EA) ORAL ONCE
Refills: 0 | Status: COMPLETED | OUTPATIENT
Start: 2019-08-03 | End: 2019-08-03

## 2019-08-03 RX ORDER — HYDRALAZINE HCL 50 MG
50 TABLET ORAL EVERY 8 HOURS
Refills: 0 | Status: DISCONTINUED | OUTPATIENT
Start: 2019-08-03 | End: 2019-08-09

## 2019-08-03 RX ORDER — HYDRALAZINE HCL 50 MG
10 TABLET ORAL ONCE
Refills: 0 | Status: COMPLETED | OUTPATIENT
Start: 2019-08-03 | End: 2019-08-03

## 2019-08-03 RX ADMIN — AMLODIPINE BESYLATE 10 MILLIGRAM(S): 2.5 TABLET ORAL at 06:16

## 2019-08-03 RX ADMIN — Medication 10 MILLIGRAM(S): at 21:11

## 2019-08-03 RX ADMIN — Medication 2 MILLIGRAM(S): at 11:10

## 2019-08-03 RX ADMIN — Medication 50 MILLIGRAM(S): at 06:16

## 2019-08-03 RX ADMIN — Medication 12.5 MILLIGRAM(S): at 06:16

## 2019-08-03 RX ADMIN — Medication 40 MILLIEQUIVALENT(S): at 08:03

## 2019-08-03 RX ADMIN — ENOXAPARIN SODIUM 30 MILLIGRAM(S): 100 INJECTION SUBCUTANEOUS at 17:03

## 2019-08-03 RX ADMIN — Medication 250 MILLIGRAM(S): at 17:03

## 2019-08-03 RX ADMIN — Medication 250 MILLIGRAM(S): at 06:16

## 2019-08-03 RX ADMIN — Medication 50 MILLIGRAM(S): at 21:12

## 2019-08-03 RX ADMIN — CHLORHEXIDINE GLUCONATE 1 APPLICATION(S): 213 SOLUTION TOPICAL at 12:16

## 2019-08-03 RX ADMIN — Medication 50 MILLIGRAM(S): at 12:21

## 2019-08-03 NOTE — PROGRESS NOTE ADULT - SUBJECTIVE AND OBJECTIVE BOX
HISTORY  29y male w/ refractory hypertension now w/ hypertensive urgency thought to be 2/2 pheochromocytoma.       24 HOUR EVENTS: No acute events overnight. Denies pain, N/V, dizziness, diaphoresis.  Remained off cardene gtt.  Did not require any additional antihypertensives.  Tolerating regular diet.  Voiding without difficulty.       SUBJECTIVE/ROS:  [x ] A ten-point review of systems was otherwise negative except as noted.  [ ] Due to altered mental status/intubation, subjective information were not able to be obtained from the patient. History was obtained, to the extent possible, from review of the chart and collateral sources of information.      NEURO  RASS: 0    GCS: 15    CAM ICU: neg  Exam: No focal neuro deficits, CARDENAS   Meds: acetaminophen   Tablet .. 650 milliGRAM(s) Oral every 6 hours PRN Temp greater or equal to 38C (100.4F), Mild Pain (1 - 3)    [x] Adequacy of sedation and pain control has been assessed and adjusted      RESPIRATORY  RR: 16 (08-03-19 @ 04:00) (16 - 22)  SpO2: 97% (08-03-19 @ 04:00) (96% - 100%)  Wt(kg): --  Exam: unlabored, clear to auscultation bilaterally  Mechanical Ventilation:     [ ] Extubation Readiness Assessed  Meds:       CARDIOVASCULAR  HR: 87 (08-03-19 @ 05:00) (85 - 122)  BP: 133/66 (08-03-19 @ 05:00) (84/39 - 200/98)  BP(mean): 91 (08-03-19 @ 05:00) (55 - 140)  ABP: --  ABP(mean): --  Wt(kg): --  CVP(cm H2O): --      Exam: NSR   Cardiac Rhythm: RRR  Perfusion     [x ]Adequate   [ ]Inadequate  Mentation   [x ]Normal       [ ]Reduced  Extremities  [x ]Warm         [ ]Cool  Volume Status [ ]Hypervolemic [x ]Euvolemic [ ]Hypovolemic  Meds: amLODIPine   Tablet 10 milliGRAM(s) Oral daily  doxazosin 8 milliGRAM(s) Oral at bedtime  hydrALAZINE 50 milliGRAM(s) Oral every 8 hours  hydrochlorothiazide 12.5 milliGRAM(s) Oral daily        GI/NUTRITION  Exam: soft, nttp, nd   Diet: DASH diet   Meds:     GENITOURINARY  I&O's Detail    08-01 @ 07:01  -  08-02 @ 07:00  --------------------------------------------------------  IN:    niCARdipine Infusion: 87.5 mL    niCARdipine Infusion: 475 mL  Total IN: 562.5 mL    OUT:    Voided: 2750 mL  Total OUT: 2750 mL    Total NET: -2187.5 mL      08-02 @ 07:01  -  08-03 @ 06:15  --------------------------------------------------------  IN:    Oral Fluid: 1320 mL  Total IN: 1320 mL    OUT:    Voided: 1050 mL  Total OUT: 1050 mL    Total NET: 270 mL          08-02    137  |  101  |  13.0  ----------------------------<  105  3.9   |  25.0  |  1.02    Ca    9.8      02 Aug 2019 06:26  Phos  4.4     08-01  Mg     2.3     08-02      [ ] Arriola catheter, indication: N/A  Meds:     Constitutional: NAD, resting comfortably, in good spirits   Respiratory: CTA b/l  Cardiovascular: RRR  Gastrointestinal: abd. soft, NT/ND  Genitourinary: voiding   Extremities: no LE edema b/l  Neurological: AOx3, GCS 15, motor intact, sensation grossly intact  Skin: warm, dry, intact    HEMATOLOGIC  Meds: enoxaparin Injectable 40 milliGRAM(s) SubCutaneous daily    [x] VTE Prophylaxis                        15.0   8.95  )-----------( 257      ( 02 Aug 2019 06:26 )             41.0       Transfusion     [ ] PRBC   [ ] Platelets   [ ] FFP   [ ] Cryoprecipitate      INFECTIOUS DISEASES  T(C): 36.7 (08-02-19 @ 23:42), Max: 37.1 (08-02-19 @ 20:00)  Wt(kg): --  WBC Count: 8.95 K/uL (08-02 @ 06:26)    Recent Cultures:  Specimen Source: .Urine, 07-30 @ 20:01; Results   Culture grew 3 or more types of organisms which indicate  collection contamination; consider recollection only if clinically  indicated.  .  TYPE: (C=Critical, N=Notification, A=Abnormal) N  TESTS:  _ Contaminated urine.  DATE/TIME CALLED: _ 08/01/2019 12:10:21  CALLED TO: Kelsey Meeks RN  READ BACK (2 Patient Identifiers)(Y/N): _ Y  READ BACK VALUES (Y/N): _ Y  CALLED BY: Kelsey Ruiz; Gram Stain: --; Organism: --    Meds:       ENDOCRINE  Capillary Blood Glucose    Meds:       ACCESS DEVICES:  [ ] Peripheral IV  [ ] Central Venous Line	[ ] R	[ ] L	[ ] IJ	[ ] Fem	[ ] SC	Placed:   [ ] Arterial Line		[ ] R	[ ] L	[ ] Fem	[ ] Rad	[ ] Ax	Placed:   [ ] PICC:					[ ] Mediport  [ ] Urinary Catheter, Date Placed:   [ ] Necessity of urinary, arterial, and venous catheters discussed    OTHER MEDICATIONS:  chlorhexidine 2% Cloths 1 Application(s) Topical daily  saccharomyces boulardii 250 milliGRAM(s) Oral two times a day      CODE STATUS:     IMAGING:

## 2019-08-03 NOTE — PROGRESS NOTE ADULT - SUBJECTIVE AND OBJECTIVE BOX
HPI/OVERNIGHT EVENTS: Patient seen and examined at bedside this AM. Patient still spiking elevated blood pressures and tachycardic, but asymptomatic. Denies fever, chills, nausea, vomitting, chest pain, SOB, dizziness, abd pain or any other concerning symptoms    Vital Signs Last 24 Hrs  T(C): 36.9 (03 Aug 2019 12:00), Max: 37.1 (02 Aug 2019 20:00)  T(F): 98.4 (03 Aug 2019 12:00), Max: 98.8 (02 Aug 2019 20:00)  HR: 95 (03 Aug 2019 14:00) (81 - 113)  BP: 170/88 (03 Aug 2019 14:00) (80/41 - 200/98)  BP(mean): 119 (03 Aug 2019 14:00) (54 - 151)  RR: 18 (03 Aug 2019 14:00) (14 - 22)  SpO2: 97% (03 Aug 2019 14:00) (96% - 100%)    I&O's Detail    02 Aug 2019 07:01  -  03 Aug 2019 07:00  --------------------------------------------------------  IN:    Oral Fluid: 1320 mL  Total IN: 1320 mL    OUT:    Voided: 1050 mL  Total OUT: 1050 mL    Total NET: 270 mL      03 Aug 2019 07:01  -  03 Aug 2019 15:05  --------------------------------------------------------  IN:    Oral Fluid: 400 mL  Total IN: 400 mL    OUT:    Voided: 250 mL  Total OUT: 250 mL    Total NET: 150 mL            Constitutional: patient resting comfortably in bed, in no acute distress  HEENT: EOMI / PERRL b/l   Neck: No JVD, full ROM without pain  Respiratory: CTAB respirations are unlabored, no accessory muscle use, no conversational dyspnea  Cardiovascular: regular rate & rhythm   Gastrointestinal: Abdomen soft, non-tender, non-distended, no rebound tenderness / guarding  Neurological: GCS: 15 (4/5/6). A&O x 3; no gross sensory / motor / coordination deficits  Psychiatric: Normal mood, normal affect  Musculoskeletal: No joint pain, swelling or deformity; no limitation of movement    LABS:                        14.4   7.31  )-----------( 231      ( 03 Aug 2019 06:27 )             38.6     08-03    137  |  100  |  16.0  ----------------------------<  108  3.7   |  26.0  |  1.16    Ca    9.7      03 Aug 2019 06:27  Phos  4.8     08-03  Mg     2.2     08-03            MEDICATIONS  (STANDING):  amLODIPine   Tablet 10 milliGRAM(s) Oral daily  chlorhexidine 2% Cloths 1 Application(s) Topical daily  doxazosin 10 milliGRAM(s) Oral at bedtime  enoxaparin Injectable 30 milliGRAM(s) SubCutaneous two times a day  hydrALAZINE 50 milliGRAM(s) Oral every 8 hours  hydrochlorothiazide 12.5 milliGRAM(s) Oral daily  saccharomyces boulardii 250 milliGRAM(s) Oral two times a day    MEDICATIONS  (PRN):  acetaminophen   Tablet .. 650 milliGRAM(s) Oral every 6 hours PRN Temp greater or equal to 38C (100.4F), Mild Pain (1 - 3)

## 2019-08-03 NOTE — PROGRESS NOTE ADULT - ASSESSMENT
A/p: male w/ refractory hypertension now w/ hypertensive urgency secondary to pheochromocytoma    Neuro: Adequate pain regimen. Sleep/ wake cycle.     Card: Cont hemodynamic monitoring.  Maintain SBP<180, DBP< 100.  Cont hydralazine, Doxazosin.     Pulm: Pulmonary toileting, IS, oob to chair    GI: Regular diet     : Voiding. Replete lytes    Endo: Euglycemia.     Hem/DVT: SCDs, Lovenox    ID: None    Tubes/Lines: PIV     Skin: Frequent turning, ambulate     Dispo: Downgrade level of care

## 2019-08-03 NOTE — PROGRESS NOTE ADULT - ASSESSMENT
29 year old male presenting with diaphoresis, intermittent HA, urinary frequency, severely elevated BP and intermittent tachycardia likely 2/2 Pheochromocytoma.     Plan:  Continue SICU management of BP  Doxazosin 1 mg q daily for HTN with increased dosage every 1-2 days as needed for BP control  monitor vital signs  Rocephin for UTI

## 2019-08-04 LAB
ANION GAP SERPL CALC-SCNC: 12 MMOL/L — SIGNIFICANT CHANGE UP (ref 5–17)
BASOPHILS # BLD AUTO: 0.06 K/UL — SIGNIFICANT CHANGE UP (ref 0–0.2)
BASOPHILS NFR BLD AUTO: 0.9 % — SIGNIFICANT CHANGE UP (ref 0–2)
BUN SERPL-MCNC: 15 MG/DL — SIGNIFICANT CHANGE UP (ref 8–20)
CALCIUM SERPL-MCNC: 9.1 MG/DL — SIGNIFICANT CHANGE UP (ref 8.6–10.2)
CHLORIDE SERPL-SCNC: 104 MMOL/L — SIGNIFICANT CHANGE UP (ref 98–107)
CO2 SERPL-SCNC: 25 MMOL/L — SIGNIFICANT CHANGE UP (ref 22–29)
CREAT SERPL-MCNC: 1.02 MG/DL — SIGNIFICANT CHANGE UP (ref 0.5–1.3)
EOSINOPHIL # BLD AUTO: 0.2 K/UL — SIGNIFICANT CHANGE UP (ref 0–0.5)
EOSINOPHIL NFR BLD AUTO: 2.9 % — SIGNIFICANT CHANGE UP (ref 0–6)
GLUCOSE SERPL-MCNC: 104 MG/DL — SIGNIFICANT CHANGE UP (ref 70–115)
HCT VFR BLD CALC: 38 % — LOW (ref 39–50)
HGB BLD-MCNC: 13.9 G/DL — SIGNIFICANT CHANGE UP (ref 13–17)
IMM GRANULOCYTES NFR BLD AUTO: 0.4 % — SIGNIFICANT CHANGE UP (ref 0–1.5)
LYMPHOCYTES # BLD AUTO: 2.24 K/UL — SIGNIFICANT CHANGE UP (ref 1–3.3)
LYMPHOCYTES # BLD AUTO: 32.1 % — SIGNIFICANT CHANGE UP (ref 13–44)
MAGNESIUM SERPL-MCNC: 2.3 MG/DL — SIGNIFICANT CHANGE UP (ref 1.6–2.6)
MCHC RBC-ENTMCNC: 31 PG — SIGNIFICANT CHANGE UP (ref 27–34)
MCHC RBC-ENTMCNC: 36.6 GM/DL — HIGH (ref 32–36)
MCV RBC AUTO: 84.8 FL — SIGNIFICANT CHANGE UP (ref 80–100)
MONOCYTES # BLD AUTO: 0.73 K/UL — SIGNIFICANT CHANGE UP (ref 0–0.9)
MONOCYTES NFR BLD AUTO: 10.5 % — SIGNIFICANT CHANGE UP (ref 2–14)
NEUTROPHILS # BLD AUTO: 3.72 K/UL — SIGNIFICANT CHANGE UP (ref 1.8–7.4)
NEUTROPHILS NFR BLD AUTO: 53.2 % — SIGNIFICANT CHANGE UP (ref 43–77)
PHOSPHATE SERPL-MCNC: 4.8 MG/DL — HIGH (ref 2.4–4.7)
PLATELET # BLD AUTO: 225 K/UL — SIGNIFICANT CHANGE UP (ref 150–400)
POTASSIUM SERPL-MCNC: 4 MMOL/L — SIGNIFICANT CHANGE UP (ref 3.5–5.3)
POTASSIUM SERPL-SCNC: 4 MMOL/L — SIGNIFICANT CHANGE UP (ref 3.5–5.3)
RBC # BLD: 4.48 M/UL — SIGNIFICANT CHANGE UP (ref 4.2–5.8)
RBC # FLD: 11.6 % — SIGNIFICANT CHANGE UP (ref 10.3–14.5)
SODIUM SERPL-SCNC: 141 MMOL/L — SIGNIFICANT CHANGE UP (ref 135–145)
WBC # BLD: 6.98 K/UL — SIGNIFICANT CHANGE UP (ref 3.8–10.5)
WBC # FLD AUTO: 6.98 K/UL — SIGNIFICANT CHANGE UP (ref 3.8–10.5)

## 2019-08-04 PROCEDURE — 99231 SBSQ HOSP IP/OBS SF/LOW 25: CPT

## 2019-08-04 PROCEDURE — 99232 SBSQ HOSP IP/OBS MODERATE 35: CPT | Mod: 25

## 2019-08-04 RX ORDER — DOXAZOSIN MESYLATE 4 MG
12 TABLET ORAL AT BEDTIME
Refills: 0 | Status: DISCONTINUED | OUTPATIENT
Start: 2019-08-04 | End: 2019-08-05

## 2019-08-04 RX ORDER — LISINOPRIL 2.5 MG/1
5 TABLET ORAL DAILY
Refills: 0 | Status: DISCONTINUED | OUTPATIENT
Start: 2019-08-04 | End: 2019-08-05

## 2019-08-04 RX ADMIN — Medication 12.5 MILLIGRAM(S): at 06:00

## 2019-08-04 RX ADMIN — Medication 50 MILLIGRAM(S): at 06:00

## 2019-08-04 RX ADMIN — Medication 50 MILLIGRAM(S): at 21:27

## 2019-08-04 RX ADMIN — LISINOPRIL 5 MILLIGRAM(S): 2.5 TABLET ORAL at 09:56

## 2019-08-04 RX ADMIN — Medication 50 MILLIGRAM(S): at 13:16

## 2019-08-04 RX ADMIN — Medication 250 MILLIGRAM(S): at 17:14

## 2019-08-04 RX ADMIN — Medication 12 MILLIGRAM(S): at 21:27

## 2019-08-04 RX ADMIN — ENOXAPARIN SODIUM 30 MILLIGRAM(S): 100 INJECTION SUBCUTANEOUS at 17:14

## 2019-08-04 RX ADMIN — Medication 250 MILLIGRAM(S): at 06:00

## 2019-08-04 RX ADMIN — ENOXAPARIN SODIUM 30 MILLIGRAM(S): 100 INJECTION SUBCUTANEOUS at 06:00

## 2019-08-04 RX ADMIN — AMLODIPINE BESYLATE 10 MILLIGRAM(S): 2.5 TABLET ORAL at 06:00

## 2019-08-04 RX ADMIN — CHLORHEXIDINE GLUCONATE 1 APPLICATION(S): 213 SOLUTION TOPICAL at 11:26

## 2019-08-04 NOTE — PROGRESS NOTE ADULT - ASSESSMENT
A/p: 29yM uncontrolled hypertension secondary to pheochromocytoma    Neuro: Adequate pain regimen.  Avoid deliriogenic meds.  Adequate sleep wake cycle.     Card: Uncontrolled hypertension.  Add Lisinopril daily and Doxazosin increased to 12mg.  Cont Amlodipine, Hydralazine and htcz.     Pulm: IS.  OOB to chair and ambulating without difficulty.      GI: Tolerating DASH diet.  Bowel regimen.     : Voiding. Replete lytes prn    Endo: None    Hem/DVT: SCDs, Lov 30 bid. Ambulating    Tubes/Lines: None    Skin: Prevent skin breakdown.    Dispo: SICU for management of uncontrolled hypertension

## 2019-08-04 NOTE — PROGRESS NOTE ADULT - SUBJECTIVE AND OBJECTIVE BOX
HISTORY  29y Male admitted for hypertension secondary to pheochromoctyoma     24 HOUR EVENTS: Patient continues to be hypertensive overnight and this AM.  Patient asymptomatic throughout.  Patient tolerating diet, voiding and ambulating without difficulty.  Denies CP, SOB, N/V, dizziness.      SUBJECTIVE/ROS:  [x ] A ten-point review of systems was otherwise negative except as noted.  [ ] Due to altered mental status/intubation, subjective information were not able to be obtained from the patient. History was obtained, to the extent possible, from review of the chart and collateral sources of information.      NEURO  RASS: 0   GCS:  15   CAM ICU: neg  Exam: no focal neuro deficits   Meds: acetaminophen   Tablet .. 650 milliGRAM(s) Oral every 6 hours PRN Temp greater or equal to 38C (100.4F), Mild Pain (1 - 3)    [x] Adequacy of sedation and pain control has been assessed and adjusted      RESPIRATORY  RR: 14 (08-04-19 @ 04:00) (14 - 20)  SpO2: 99% (08-04-19 @ 04:00) (96% - 99%)  Wt(kg): --  Exam: unlabored, clear to auscultation bilaterally  Mechanical Ventilation:     [ ] Extubation Readiness Assessed  Meds:       CARDIOVASCULAR  HR: 87 (08-04-19 @ 07:00) (77 - 128)  BP: 182/109 (08-04-19 @ 07:00) (139/86 - 199/118)  BP(mean): 139 (08-04-19 @ 07:00) (91 - 151)  ABP: --  ABP(mean): --  Wt(kg): --  CVP(cm H2O): --      Exam: NSR   Cardiac Rhythm: RRR  Perfusion     [ x]Adequate   [ ]Inadequate  Mentation   [x ]Normal       [ ]Reduced  Extremities  [x ]Warm         [ ]Cool  Volume Status [ ]Hypervolemic [ x]Euvolemic [ ]Hypovolemic  Meds: amLODIPine   Tablet 10 milliGRAM(s) Oral daily  doxazosin 12 milliGRAM(s) Oral at bedtime  hydrALAZINE 50 milliGRAM(s) Oral every 8 hours  hydrochlorothiazide 12.5 milliGRAM(s) Oral daily        GI/NUTRITION  Exam: soft, nttp, nondistended   Diet: Reg   Meds:     GENITOURINARY  I&O's Detail    08-03 @ 07:01 - 08-04 @ 07:00  --------------------------------------------------------  IN:    Oral Fluid: 700 mL  Total IN: 700 mL    OUT:    Voided: 1125 mL  Total OUT: 1125 mL    Total NET: -425 mL          08-04    141  |  104  |  15.0  ----------------------------<  104  4.0   |  25.0  |  1.02    Ca    9.1      04 Aug 2019 06:15  Phos  4.8     08-04  Mg     2.3     08-04      [ ] Arriola catheter, indication: N/A  Meds:     Constitutional: NAD, resting comfortably, ambulating the unit   Eyes: EOM intact   Neck: trachea midline, FROM  Respiratory: CTA b/l, unlabored   Cardiovascular: S1S2, RRR  Gastrointestinal: abd. soft, NT/ND  Genitourinary: voiding   Extremities: no LE edema b/l  Neurological: AOx3, sensation grossly intact  Skin: warm, dry, intact     HEMATOLOGIC  Meds: enoxaparin Injectable 30 milliGRAM(s) SubCutaneous two times a day    [x] VTE Prophylaxis                        13.9   6.98  )-----------( 225      ( 04 Aug 2019 06:15 )             38.0       Transfusion     [ ] PRBC   [ ] Platelets   [ ] FFP   [ ] Cryoprecipitate      INFECTIOUS DISEASES  T(C): 36.7 (08-04-19 @ 08:00), Max: 36.9 (08-03-19 @ 12:00)  Wt(kg): --  WBC Count: 6.98 K/uL (08-04 @ 06:15)    Recent Cultures:  Specimen Source: .Urine, 07-30 @ 20:01; Results   Culture grew 3 or more types of organisms which indicate  collection contamination; consider recollection only if clinically  indicated.  .  TYPE: (C=Critical, N=Notification, A=Abnormal) N  TESTS:  _ Contaminated urine.  DATE/TIME CALLED: _ 08/01/2019 12:10:21  CALLED TO: _ Isabela Meeks RN  READ BACK (2 Patient Identifiers)(Y/N): _ Y  READ BACK VALUES (Y/N): _ Y  CALLED BY: Kelsey Ruiz; Gram Stain: --; Organism: --    Meds:       ENDOCRINE  Capillary Blood Glucose    Meds:       ACCESS DEVICES:  [ ] Peripheral IV  [ ] Central Venous Line	[ ] R	[ ] L	[ ] IJ	[ ] Fem	[ ] SC	Placed:   [ ] Arterial Line		[ ] R	[ ] L	[ ] Fem	[ ] Rad	[ ] Ax	Placed:   [ ] PICC:					[ ] Mediport  [ ] Urinary Catheter, Date Placed:   [ ] Necessity of urinary, arterial, and venous catheters discussed    OTHER MEDICATIONS:  chlorhexidine 2% Cloths 1 Application(s) Topical daily  saccharomyces boulardii 250 milliGRAM(s) Oral two times a day      CODE STATUS:     IMAGING:

## 2019-08-04 NOTE — PROGRESS NOTE ADULT - ASSESSMENT
29 year old male with Left adrenal mass/thickening up to 1.2 cm with long standing h/o HTN since 12 years old.  Mildly elevated plasma normetaphrines on prior admission 3 months ago. Differential diagnoses included - pheochromocytoma vs aldosterone production adrenal tumor vs benign adrenal adenoma.  He is not Cushingoid  - labs thus far - normal thyroid levels, chromogranin A level normal, renin normal, not suppressed and PAC/PRA 12 - excludes hyperthyroidism and primary aldosteronism  - still waiting for results on plasma metanephrines and 24 hour urine metanephrine/catecholamine testing  - cont care per ICU team

## 2019-08-04 NOTE — PROGRESS NOTE ADULT - SUBJECTIVE AND OBJECTIVE BOX
cc: adrenal mass  INTERVAL HPI/OVERNIGHT EVENTS: no complaints    MEDICATIONS  (STANDING):  amLODIPine   Tablet 10 milliGRAM(s) Oral daily  chlorhexidine 2% Cloths 1 Application(s) Topical daily  doxazosin 12 milliGRAM(s) Oral at bedtime  enoxaparin Injectable 30 milliGRAM(s) SubCutaneous two times a day  hydrALAZINE 50 milliGRAM(s) Oral every 8 hours  hydrochlorothiazide 12.5 milliGRAM(s) Oral daily  lisinopril 5 milliGRAM(s) Oral daily  saccharomyces boulardii 250 milliGRAM(s) Oral two times a day    MEDICATIONS  (PRN):  acetaminophen   Tablet .. 650 milliGRAM(s) Oral every 6 hours PRN Temp greater or equal to 38C (100.4F), Mild Pain (1 - 3)      Allergies  No Known Allergies      Vital Signs Last 24 Hrs  T(C): 36.8 (04 Aug 2019 12:00), Max: 36.9 (04 Aug 2019 00:00)  T(F): 98.2 (04 Aug 2019 12:00), Max: 98.5 (04 Aug 2019 00:00)  HR: 98 (04 Aug 2019 15:00) (77 - 128)  BP: 127/91 (04 Aug 2019 15:00) (127/91 - 190/116)  BP(mean): 106 (04 Aug 2019 15:00) (91 - 145)  RR: 16 (04 Aug 2019 15:00) (14 - 20)  SpO2: 100% (04 Aug 2019 15:00) (98% - 100%)    PHYSICAL EXAM:  General appearance: Well developed, well nourished. Obese male, no Cushingoid features  Eyes: Pupils equal EOMI  Lungs: Normal respiratory excursion. Lungs clear. no w/r/r  CV: Regular cardiac rhythm. No murmur. No edema  Abdomen: Soft, non tender, obese  Skin: Warm and moist. No rash. (+) acanthosis.  Neuro: Cranial nerves intact. Normal motor and sensory function. DTR's normal.  Psych: Normal affect, good judgement.      LABS:                        13.9   6.98  )-----------( 225      ( 04 Aug 2019 06:15 )             38.0     08-04    141  |  104  |  15.0  ----------------------------<  104  4.0   |  25.0  |  1.02    Ca    9.1      04 Aug 2019 06:15  Phos  4.8     08-04  Mg     2.3     08-04                RADIOLOGY & ADDITIONAL TESTS:

## 2019-08-05 DIAGNOSIS — I10 ESSENTIAL (PRIMARY) HYPERTENSION: ICD-10-CM

## 2019-08-05 LAB
ANION GAP SERPL CALC-SCNC: 11 MMOL/L — SIGNIFICANT CHANGE UP (ref 5–17)
BUN SERPL-MCNC: 13 MG/DL — SIGNIFICANT CHANGE UP (ref 8–20)
CALCIUM SERPL-MCNC: 9.6 MG/DL — SIGNIFICANT CHANGE UP (ref 8.6–10.2)
CHLORIDE SERPL-SCNC: 101 MMOL/L — SIGNIFICANT CHANGE UP (ref 98–107)
CO2 SERPL-SCNC: 28 MMOL/L — SIGNIFICANT CHANGE UP (ref 22–29)
CREAT SERPL-MCNC: 1.16 MG/DL — SIGNIFICANT CHANGE UP (ref 0.5–1.3)
GLUCOSE SERPL-MCNC: 81 MG/DL — SIGNIFICANT CHANGE UP (ref 70–115)
MAGNESIUM SERPL-MCNC: 2.3 MG/DL — SIGNIFICANT CHANGE UP (ref 1.6–2.6)
PHOSPHATE SERPL-MCNC: 5.3 MG/DL — HIGH (ref 2.4–4.7)
POTASSIUM SERPL-MCNC: 4 MMOL/L — SIGNIFICANT CHANGE UP (ref 3.5–5.3)
POTASSIUM SERPL-SCNC: 4 MMOL/L — SIGNIFICANT CHANGE UP (ref 3.5–5.3)
SODIUM SERPL-SCNC: 140 MMOL/L — SIGNIFICANT CHANGE UP (ref 135–145)

## 2019-08-05 PROCEDURE — 99232 SBSQ HOSP IP/OBS MODERATE 35: CPT

## 2019-08-05 RX ORDER — LISINOPRIL 2.5 MG/1
10 TABLET ORAL DAILY
Refills: 0 | Status: DISCONTINUED | OUTPATIENT
Start: 2019-08-05 | End: 2019-08-05

## 2019-08-05 RX ORDER — LISINOPRIL 2.5 MG/1
5 TABLET ORAL DAILY
Refills: 0 | Status: DISCONTINUED | OUTPATIENT
Start: 2019-08-05 | End: 2019-08-06

## 2019-08-05 RX ORDER — DOXAZOSIN MESYLATE 4 MG
14 TABLET ORAL AT BEDTIME
Refills: 0 | Status: DISCONTINUED | OUTPATIENT
Start: 2019-08-05 | End: 2019-08-06

## 2019-08-05 RX ADMIN — AMLODIPINE BESYLATE 10 MILLIGRAM(S): 2.5 TABLET ORAL at 06:03

## 2019-08-05 RX ADMIN — ENOXAPARIN SODIUM 30 MILLIGRAM(S): 100 INJECTION SUBCUTANEOUS at 18:45

## 2019-08-05 RX ADMIN — Medication 14 MILLIGRAM(S): at 21:11

## 2019-08-05 RX ADMIN — ENOXAPARIN SODIUM 30 MILLIGRAM(S): 100 INJECTION SUBCUTANEOUS at 06:02

## 2019-08-05 RX ADMIN — LISINOPRIL 5 MILLIGRAM(S): 2.5 TABLET ORAL at 06:02

## 2019-08-05 RX ADMIN — Medication 50 MILLIGRAM(S): at 06:02

## 2019-08-05 RX ADMIN — Medication 12.5 MILLIGRAM(S): at 06:03

## 2019-08-05 RX ADMIN — Medication 50 MILLIGRAM(S): at 15:10

## 2019-08-05 RX ADMIN — Medication 50 MILLIGRAM(S): at 21:11

## 2019-08-05 RX ADMIN — CHLORHEXIDINE GLUCONATE 1 APPLICATION(S): 213 SOLUTION TOPICAL at 11:33

## 2019-08-05 NOTE — PROGRESS NOTE ADULT - SUBJECTIVE AND OBJECTIVE BOX
HPI/OVERNIGHT EVENTS: Patient seen and examined at bedside this AM. Patient still hypertensive to 160-170's/'s. Patient has no complaints.    Denies blurry vision, palpaitations, fever, chills, nausea, vomitting, chest pain, SOB, dizziness, abd pain or any other concerning symptoms    Vital Signs Last 24 Hrs  T(C): 37.3 (04 Aug 2019 23:30), Max: 37.3 (04 Aug 2019 23:30)  T(F): 99.1 (04 Aug 2019 23:30), Max: 99.1 (04 Aug 2019 23:30)  HR: 82 (05 Aug 2019 08:00) (79 - 134)  BP: 161/86 (05 Aug 2019 08:00) (119/69 - 190/91)  BP(mean): 116 (05 Aug 2019 08:00) (89 - 131)  RR: 18 (05 Aug 2019 07:30) (14 - 25)  SpO2: 99% (05 Aug 2019 05:00) (97% - 100%)    I&O's Detail    04 Aug 2019 07:01  -  05 Aug 2019 07:00  --------------------------------------------------------  IN:    Oral Fluid: 1920 mL  Total IN: 1920 mL    OUT:    Voided: 1775 mL  Total OUT: 1775 mL    Total NET: 145 mL              Constitutional: patient resting comfortably in bed, in no acute distress  HEENT: EOMI / PERRL b/l   Neck: No JVD, full ROM without pain  Respiratory: CTAB respirations are unlabored, no accessory muscle use, no conversational dyspnea  Cardiovascular: regular rate & rhythm   Gastrointestinal: Abdomen soft, non-tender, non-distended, no rebound tenderness / guarding  Neurological: GCS: 15 (4/5/6). A&O x 3; no gross sensory / motor / coordination deficits  Psychiatric: Normal mood, normal affect  Musculoskeletal: No joint pain, swelling or deformity; no limitation of movement    LABS:                        13.9   6.98  )-----------( 225      ( 04 Aug 2019 06:15 )             38.0     08-05    140  |  101  |  13.0  ----------------------------<  81  4.0   |  28.0  |  1.16    Ca    9.6      05 Aug 2019 06:50  Phos  5.3     08-05  Mg     2.3     08-05            MEDICATIONS  (STANDING):  amLODIPine   Tablet 10 milliGRAM(s) Oral daily  chlorhexidine 2% Cloths 1 Application(s) Topical daily  doxazosin 14 milliGRAM(s) Oral at bedtime  enoxaparin Injectable 30 milliGRAM(s) SubCutaneous two times a day  hydrALAZINE 50 milliGRAM(s) Oral every 8 hours  hydrochlorothiazide 12.5 milliGRAM(s) Oral daily  lisinopril 5 milliGRAM(s) Oral daily    MEDICATIONS  (PRN):  acetaminophen   Tablet .. 650 milliGRAM(s) Oral every 6 hours PRN Temp greater or equal to 38C (100.4F), Mild Pain (1 - 3)

## 2019-08-05 NOTE — PROGRESS NOTE ADULT - ASSESSMENT
29 year old male presenting with diaphoresis, intermittent HA, urinary frequency, severely elevated BP and intermittent tachycardia likely 2/2 Pheochromocytoma.     Plan:  Continue SICU management of BP, agree with increasing lisinopril  monitor vital signs  Rocephin for UTI

## 2019-08-05 NOTE — PROGRESS NOTE ADULT - SUBJECTIVE AND OBJECTIVE BOX
INTERVAL HPI/OVERNIGHT EVENTS/SUBJECTIVE: Pt had an episode of sinus tachycardia to the 140s overnight while involved in an argument with his friends and family over the phone, which resolved once he calmed down. Pt remained hypertensive overnight with SBPs 160s-170s and DBPs 80s-90s, but did not require any additional anti-hypertensives. Pt had no complaints overnight.     ICU Vital Signs Last 24 Hrs  T(C): 37.3 (04 Aug 2019 23:30), Max: 37.3 (04 Aug 2019 23:30)  T(F): 99.1 (04 Aug 2019 23:30), Max: 99.1 (04 Aug 2019 23:30)  HR: 113 (05 Aug 2019 02:00) (79 - 134)  BP: 170/81 (05 Aug 2019 02:00) (127/91 - 190/91)  BP(mean): 117 (05 Aug 2019 02:00) (91 - 139)  ABP: --  ABP(mean): --  RR: 20 (05 Aug 2019 01:00) (14 - 25)  SpO2: 98% (05 Aug 2019 01:00) (97% - 100%)      I&O's Detail    03 Aug 2019 07:01  -  04 Aug 2019 07:00  --------------------------------------------------------  IN:    Oral Fluid: 700 mL  Total IN: 700 mL    OUT:    Voided: 1125 mL  Total OUT: 1125 mL    Total NET: -425 mL      04 Aug 2019 07:01  -  05 Aug 2019 02:42  --------------------------------------------------------  IN:    Oral Fluid: 1920 mL  Total IN: 1920 mL    OUT:    Voided: 1775 mL  Total OUT: 1775 mL    Total NET: 145 mL                MEDICATIONS  (STANDING):  amLODIPine   Tablet 10 milliGRAM(s) Oral daily  chlorhexidine 2% Cloths 1 Application(s) Topical daily  doxazosin 12 milliGRAM(s) Oral at bedtime  enoxaparin Injectable 30 milliGRAM(s) SubCutaneous two times a day  hydrALAZINE 50 milliGRAM(s) Oral every 8 hours  hydrochlorothiazide 12.5 milliGRAM(s) Oral daily  lisinopril 5 milliGRAM(s) Oral daily  saccharomyces boulardii 250 milliGRAM(s) Oral two times a day    MEDICATIONS  (PRN):  acetaminophen   Tablet .. 650 milliGRAM(s) Oral every 6 hours PRN Temp greater or equal to 38C (100.4F), Mild Pain (1 - 3)      PHYSICAL EXAM:     Gen: NAD, Well appearing, No cyanosis, Pallor.    Eyes: PERRL, EOMI    Neurological: A&Ox3, GCS 15, No focal deficit.     Neck: Supple. FROM no pain.  No JVD.     Pulmonary: Respirations unlabored, no accessory muscle usage     Cardiovascular: RRR       LABS:  CBC Full  -  ( 04 Aug 2019 06:15 )  WBC Count : 6.98 K/uL  RBC Count : 4.48 M/uL  Hemoglobin : 13.9 g/dL  Hematocrit : 38.0 %  Platelet Count - Automated : 225 K/uL  Mean Cell Volume : 84.8 fl  Mean Cell Hemoglobin : 31.0 pg  Mean Cell Hemoglobin Concentration : 36.6 gm/dL  Auto Neutrophil # : 3.72 K/uL  Auto Lymphocyte # : 2.24 K/uL  Auto Monocyte # : 0.73 K/uL  Auto Eosinophil # : 0.20 K/uL  Auto Basophil # : 0.06 K/uL  Auto Neutrophil % : 53.2 %  Auto Lymphocyte % : 32.1 %  Auto Monocyte % : 10.5 %  Auto Eosinophil % : 2.9 %  Auto Basophil % : 0.9 %    08-04    141  |  104  |  15.0  ----------------------------<  104  4.0   |  25.0  |  1.02    Ca    9.1      04 Aug 2019 06:15  Phos  4.8     08-04  Mg     2.3     08-04          RECENT CULTURES:  07-30 .Urine XXXX XXXX   Culture grew 3 or more types of organisms which indicate  collection contamination; consider recollection only if clinically  indicated.  .  TYPE: (C=Critical, N=Notification, A=Abnormal) N  TESTS:  _ Contaminated urine.  DATE/TIME CALLED: _ 08/01/2019 12:10:21  CALLED TO: Kelsey Meeks RN  READ BACK (2 Patient Identifiers)(Y/N): _ Y  READ BACK VALUES (Y/N): _ Y  CALLED BY: Kelsey Ruiz        ASSESSMENT/PLAN:  29y Male presenting with uncontrolled HTN thought to be 2/2 pheochromocytoma.       Neurological:  -Maintain sleep wake cycle   -Acetaminophen prn for pain control/headaches     Pulmonary:  -IS  -OOB  -Encourage ambulation    Cardiovascular:  -Continue hemodynamic monitoring   -Continue Doxazosin 12 mg QD for alpha blockade  -Continue Hydralazine 50 mg q8h, Amlodipine 10 mg QD and Hctz 12.5 mg QD for additional BP control   -Increase Lisinopril to 10 mg QD as pt continues to be hypertensive on current regimen  -Consider re-starting Cardene gtt if cannot maintain SBP <180 or DBP <100    Gastrointestinal:  -Pt tolerating DASH/TLC diet   -D/c Florastor as pt is no longer on abx    Genitourinary:  -Voiding without difficulty   -f/u AM labs and replete electrolytes prn     Endo:   -f/u serum metanephrine and 24 hr urine catecholamines     Heme:  -SCDs   -Lovenox 30 mg BID   -Encourage ambulation     Skin:  -Encourage OOB, ambulation and frequent position changes to prevent skin breakdown     Dispo: Pt has remained stable off of Cardene drip for over 72 hours. Will discuss downgrading level of care with day team.       CARE TIME SPENT: 36 minutes

## 2019-08-06 DIAGNOSIS — R51 HEADACHE: ICD-10-CM

## 2019-08-06 DIAGNOSIS — I10 ESSENTIAL (PRIMARY) HYPERTENSION: ICD-10-CM

## 2019-08-06 DIAGNOSIS — R00.0 TACHYCARDIA, UNSPECIFIED: ICD-10-CM

## 2019-08-06 LAB
ANION GAP SERPL CALC-SCNC: 10 MMOL/L — SIGNIFICANT CHANGE UP (ref 5–17)
ANION GAP SERPL CALC-SCNC: 11 MMOL/L — SIGNIFICANT CHANGE UP (ref 5–17)
BUN SERPL-MCNC: 15 MG/DL — SIGNIFICANT CHANGE UP (ref 8–20)
BUN SERPL-MCNC: 17 MG/DL — SIGNIFICANT CHANGE UP (ref 8–20)
CALCIUM SERPL-MCNC: 9.5 MG/DL — SIGNIFICANT CHANGE UP (ref 8.6–10.2)
CALCIUM SERPL-MCNC: 9.5 MG/DL — SIGNIFICANT CHANGE UP (ref 8.6–10.2)
CHLORIDE SERPL-SCNC: 100 MMOL/L — SIGNIFICANT CHANGE UP (ref 98–107)
CHLORIDE SERPL-SCNC: 102 MMOL/L — SIGNIFICANT CHANGE UP (ref 98–107)
CO2 SERPL-SCNC: 26 MMOL/L — SIGNIFICANT CHANGE UP (ref 22–29)
CO2 SERPL-SCNC: 27 MMOL/L — SIGNIFICANT CHANGE UP (ref 22–29)
CREAT SERPL-MCNC: 1.16 MG/DL — SIGNIFICANT CHANGE UP (ref 0.5–1.3)
CREAT SERPL-MCNC: 1.21 MG/DL — SIGNIFICANT CHANGE UP (ref 0.5–1.3)
GLUCOSE SERPL-MCNC: 90 MG/DL — SIGNIFICANT CHANGE UP (ref 70–115)
GLUCOSE SERPL-MCNC: 98 MG/DL — SIGNIFICANT CHANGE UP (ref 70–115)
MAGNESIUM SERPL-MCNC: 2.2 MG/DL — SIGNIFICANT CHANGE UP (ref 1.6–2.6)
METANEPHRINE, PL: <10 PG/ML — SIGNIFICANT CHANGE UP (ref 0–62)
NORMETANEPHRINE, PL: 83 PG/ML — SIGNIFICANT CHANGE UP (ref 0–145)
PHOSPHATE SERPL-MCNC: 5.4 MG/DL — HIGH (ref 2.4–4.7)
POTASSIUM SERPL-MCNC: 3.8 MMOL/L — SIGNIFICANT CHANGE UP (ref 3.5–5.3)
POTASSIUM SERPL-MCNC: 3.9 MMOL/L — SIGNIFICANT CHANGE UP (ref 3.5–5.3)
POTASSIUM SERPL-SCNC: 3.8 MMOL/L — SIGNIFICANT CHANGE UP (ref 3.5–5.3)
POTASSIUM SERPL-SCNC: 3.9 MMOL/L — SIGNIFICANT CHANGE UP (ref 3.5–5.3)
SODIUM SERPL-SCNC: 138 MMOL/L — SIGNIFICANT CHANGE UP (ref 135–145)
SODIUM SERPL-SCNC: 138 MMOL/L — SIGNIFICANT CHANGE UP (ref 135–145)

## 2019-08-06 PROCEDURE — 99231 SBSQ HOSP IP/OBS SF/LOW 25: CPT

## 2019-08-06 PROCEDURE — 99232 SBSQ HOSP IP/OBS MODERATE 35: CPT

## 2019-08-06 RX ORDER — DOXAZOSIN MESYLATE 4 MG
8 TABLET ORAL
Refills: 0 | Status: DISCONTINUED | OUTPATIENT
Start: 2019-08-06 | End: 2019-08-09

## 2019-08-06 RX ORDER — AMLODIPINE BESYLATE 2.5 MG/1
10 TABLET ORAL DAILY
Refills: 0 | Status: DISCONTINUED | OUTPATIENT
Start: 2019-08-06 | End: 2019-08-09

## 2019-08-06 RX ORDER — LISINOPRIL 2.5 MG/1
7.5 TABLET ORAL
Refills: 0 | Status: DISCONTINUED | OUTPATIENT
Start: 2019-08-06 | End: 2019-08-09

## 2019-08-06 RX ORDER — LISINOPRIL 2.5 MG/1
7.5 TABLET ORAL DAILY
Refills: 0 | Status: DISCONTINUED | OUTPATIENT
Start: 2019-08-06 | End: 2019-08-06

## 2019-08-06 RX ORDER — POTASSIUM CHLORIDE 20 MEQ
20 PACKET (EA) ORAL ONCE
Refills: 0 | Status: COMPLETED | OUTPATIENT
Start: 2019-08-06 | End: 2019-08-06

## 2019-08-06 RX ORDER — HYDROCHLOROTHIAZIDE 25 MG
12.5 TABLET ORAL DAILY
Refills: 0 | Status: DISCONTINUED | OUTPATIENT
Start: 2019-08-06 | End: 2019-08-06

## 2019-08-06 RX ADMIN — Medication 8 MILLIGRAM(S): at 09:32

## 2019-08-06 RX ADMIN — Medication 12.5 MILLIGRAM(S): at 05:32

## 2019-08-06 RX ADMIN — AMLODIPINE BESYLATE 10 MILLIGRAM(S): 2.5 TABLET ORAL at 05:32

## 2019-08-06 RX ADMIN — Medication 50 MILLIGRAM(S): at 05:32

## 2019-08-06 RX ADMIN — Medication 8 MILLIGRAM(S): at 20:40

## 2019-08-06 RX ADMIN — CHLORHEXIDINE GLUCONATE 1 APPLICATION(S): 213 SOLUTION TOPICAL at 10:50

## 2019-08-06 RX ADMIN — Medication 20 MILLIEQUIVALENT(S): at 06:57

## 2019-08-06 RX ADMIN — ENOXAPARIN SODIUM 30 MILLIGRAM(S): 100 INJECTION SUBCUTANEOUS at 17:22

## 2019-08-06 RX ADMIN — LISINOPRIL 7.5 MILLIGRAM(S): 2.5 TABLET ORAL at 09:33

## 2019-08-06 RX ADMIN — ENOXAPARIN SODIUM 30 MILLIGRAM(S): 100 INJECTION SUBCUTANEOUS at 05:32

## 2019-08-06 RX ADMIN — Medication 50 MILLIGRAM(S): at 21:15

## 2019-08-06 RX ADMIN — Medication 50 MILLIGRAM(S): at 13:19

## 2019-08-06 NOTE — CHART NOTE - NSCHARTNOTEFT_GEN_A_CORE
HPI/OVERNIGHT EVENTS: Patient seen and examined at bedside. Alpha blockade completed in SICU and patient was clinically stable and downgraded to the floor. patient denies fever, chills, nausea, vomiting, chest pain, SOB, dizziness, abd pain or headaches. BP on arrival to the floor was 158/98.    Vital Signs Last 24 Hrs  T(C): 36.7 (06 Aug 2019 19:02), Max: 37.3 (06 Aug 2019 15:00)  T(F): 98.1 (06 Aug 2019 19:02), Max: 99.1 (06 Aug 2019 15:00)  HR: 107 (06 Aug 2019 19:02) (75 - 119)  BP: 158/98 (06 Aug 2019 19:02) (115/78 - 173/93)  BP(mean): 113 (06 Aug 2019 18:00) (82 - 127)  RR: 18 (06 Aug 2019 19:02) (16 - 20)  SpO2: 97% (06 Aug 2019 19:02) (97% - 100%)    I&O's Detail    05 Aug 2019 07:01  -  06 Aug 2019 07:00  --------------------------------------------------------  IN:    Oral Fluid: 1450 mL  Total IN: 1450 mL    OUT:    Voided: 1125 mL  Total OUT: 1125 mL    Total NET: 325 mL      06 Aug 2019 07:01  -  06 Aug 2019 20:18  --------------------------------------------------------  IN:    Oral Fluid: 1680 mL  Total IN: 1680 mL    OUT:    Voided: 600 mL  Total OUT: 600 mL    Total NET: 1080 mL      Constitutional: patient sitting comfortably in bed, in no acute distress  HEENT: EOMI / PERRL b/l   Neck: No JVD, full ROM without pain  Respiratory: respirations are unlabored, no accessory muscle use, no conversational dyspnea  Cardiovascular: tachycardic to 107 but regular rhythm  Gastrointestinal: Abdomen soft, non-tender, non-distended, no rebound tenderness / guarding  Neurological: GCS: 15 (4/5/6). A&O x 3; no gross sensory / motor / coordination deficits  Psychiatric: Normal mood, normal affect  Musculoskeletal: No joint pain, swelling or deformity; no limitation of movement    LABS:    08-06    138  |  102  |  15.0  ----------------------------<  98  3.8   |  26.0  |  1.21    Ca    9.5      06 Aug 2019 04:39  Phos  5.4     08-06  Mg     2.2     08-06      MEDICATIONS  (STANDING):  amLODIPine   Tablet 10 milliGRAM(s) Oral daily  chlorhexidine 2% Cloths 1 Application(s) Topical daily  doxazosin 8 milliGRAM(s) Oral <User Schedule>  enoxaparin Injectable 30 milliGRAM(s) SubCutaneous two times a day  hydrALAZINE 50 milliGRAM(s) Oral every 8 hours  lisinopril 7.5 milliGRAM(s) Oral <User Schedule>    MEDICATIONS  (PRN):  acetaminophen   Tablet .. 650 milliGRAM(s) Oral every 6 hours PRN Temp greater or equal to 38C (100.4F), Mild Pain (1 - 3)      Asssessment    29 year old male presenting with diaphoresis, intermittent HA, urinary frequency, severely elevated BP and intermittent tachycardia likely 2/2 Pheochromocytoma. Patient has received alpha blockade and has moved back to the floor. patient is doing well    - Plan to begin beta blockade   - Once SBP more consistently < 160 then increasing doses of BB can be started. Will CW HCTZ, hydralazine and Norvasc

## 2019-08-06 NOTE — PROGRESS NOTE ADULT - SUBJECTIVE AND OBJECTIVE BOX
INTERVAL HPI/OVERNIGHT EVENTS/SUBJECTIVE: Pt having improved HTN with increase of Cardura but is still experiencing elevated BP and occasional . No readings > 185/100.  Pt states he feels better day by day and denies sweating, CP, SOB or other CO. Pt was written to floors but bed had been given away thus he remained in SICU.      ICU Vital Signs Last 24 Hrs  T(C): 36.7 (05 Aug 2019 23:53), Max: 37.5 (05 Aug 2019 20:00)  T(F): 98 (05 Aug 2019 23:53), Max: 99.5 (05 Aug 2019 20:00)  HR: 78 (06 Aug 2019 03:00) (78 - 122)  BP: 157/86 (06 Aug 2019 00:00) (134/75 - 177/88)  BP(mean): 114 (06 Aug 2019 00:00) (92 - 125)  ABP: --  ABP(mean): --  RR: 18 (05 Aug 2019 19:00) (16 - 20)  SpO2: 98% (05 Aug 2019 16:00) (97% - 99%)      I&O's Detail    04 Aug 2019 07:01  -  05 Aug 2019 07:00  --------------------------------------------------------  IN:    Oral Fluid: 1920 mL  Total IN: 1920 mL    OUT:    Voided: 1775 mL  Total OUT: 1775 mL    Total NET: 145 mL      05 Aug 2019 07:01  -  06 Aug 2019 03:06  --------------------------------------------------------  IN:    Oral Fluid: 1450 mL  Total IN: 1450 mL    OUT:    Voided: 325 mL  Total OUT: 325 mL    Total NET: 1125 mL                MEDICATIONS  (STANDING):  amLODIPine   Tablet 10 milliGRAM(s) Oral daily  chlorhexidine 2% Cloths 1 Application(s) Topical daily  doxazosin 14 milliGRAM(s) Oral at bedtime  enoxaparin Injectable 30 milliGRAM(s) SubCutaneous two times a day  hydrALAZINE 50 milliGRAM(s) Oral every 8 hours  hydrochlorothiazide 12.5 milliGRAM(s) Oral daily  lisinopril 7.5 milliGRAM(s) Oral daily    MEDICATIONS  (PRN):  acetaminophen   Tablet .. 650 milliGRAM(s) Oral every 6 hours PRN Temp greater or equal to 38C (100.4F), Mild Pain (1 - 3)      NUTRITION/IVF: Reg diet / IVL    PHYSICAL EXAM:     Gen: NAD, Well appearing, No cyanosis, Pallor.    Eyes: PERRL ~ 3mm, EOMI,     Neurological: A&Ox3, GCS 15, No focal deficit.     ENMT: Clear canals, clear throat.      Neck: Supple. NT AT, FROM no pain.  No JVD. No meningeal signs    Pulmonary: NAD, CTA, = BL .      Cardiovascular: RRR, S1, S2, No Murmurs, rubs or gallops noted.    Gastrointestinal: ND, Soft, NT.    Extremities: NT, AT, no edema, erythema or palpable cord noted.  FROM, = 2+ pulses throughout.    LABS:  CBC Full  -  ( 04 Aug 2019 06:15 )  WBC Count : 6.98 K/uL  RBC Count : 4.48 M/uL  Hemoglobin : 13.9 g/dL  Hematocrit : 38.0 %  Platelet Count - Automated : 225 K/uL  Mean Cell Volume : 84.8 fl  Mean Cell Hemoglobin : 31.0 pg  Mean Cell Hemoglobin Concentration : 36.6 gm/dL  Auto Neutrophil # : 3.72 K/uL  Auto Lymphocyte # : 2.24 K/uL  Auto Monocyte # : 0.73 K/uL  Auto Eosinophil # : 0.20 K/uL  Auto Basophil # : 0.06 K/uL  Auto Neutrophil % : 53.2 %  Auto Lymphocyte % : 32.1 %  Auto Monocyte % : 10.5 %  Auto Eosinophil % : 2.9 %  Auto Basophil % : 0.9 %    08-05    140  |  101  |  13.0  ----------------------------<  81  4.0   |  28.0  |  1.16    Ca    9.6      05 Aug 2019 06:50  Phos  5.3     08-05  Mg     2.3     08-05          RECENT CULTURES:  07-30 .Urine XXXX XXXX   Culture grew 3 or more types of organisms which indicate  collection contamination; consider recollection only if clinically  indicated.  .  TYPE: (C=Critical, N=Notification, A=Abnormal) N  TESTS:  _ Contaminated urine.  DATE/TIME CALLED: _ 08/01/2019 12:10:21  CALLED TO: Kelsey Meeks RN  READ BACK (2 Patient Identifiers)(Y/N): _ Y  READ BACK VALUES (Y/N): _ Y  CALLED BY: Kelsey Ruiz              CAPILLARY BLOOD GLUCOSE      RADIOLOGY & ADDITIONAL STUDIES:    ASSESSMENT/PLAN:  29yMale presenting with: 30 yo M with severe HTN better controlled but still moderate.  Likely secondary to Pheochromocytoma.     Neurological: Doing well.  Tylenol first line for analgesics PRN    Cardiovascular: I have increased Lisinopril today and spaced it slightly from others.  Hold parameters written.  If SBP Still persistently > 160 later tonight then Cardura should be increased to its max of 16mg at night.  If still elevated next morning then should increase Lisinopril to 10mg QD.  Once SBP more consistently < 160 then increasing doses of BB can be started. Will CW HCTZ, hydralazine and Norvasc.  Avoid caffeine    Gastrointestinal: Reg diet    Heme: Keep Lovenox 30 BID    ID: No need for ABX.  No UTI    Endo: FU Urine studies.  Appreciate ENDO recs.    Dispo: Pt stable for downgrade out of SICU      CARE TIME SPENT: 28 minutes, INTERVAL HPI/OVERNIGHT EVENTS/SUBJECTIVE: Pt having improved HTN with increase of Cardura but is still experiencing elevated BP and occasional . No readings > 185/100.  Pt states he feels better day by day and denies sweating, CP, SOB or other CO. Pt was written to floors but bed had been given away thus he remained in SICU.      ICU Vital Signs Last 24 Hrs  T(C): 36.7 (05 Aug 2019 23:53), Max: 37.5 (05 Aug 2019 20:00)  T(F): 98 (05 Aug 2019 23:53), Max: 99.5 (05 Aug 2019 20:00)  HR: 78 (06 Aug 2019 03:00) (78 - 122)  BP: 157/86 (06 Aug 2019 00:00) (134/75 - 177/88)  BP(mean): 114 (06 Aug 2019 00:00) (92 - 125)  ABP: --  ABP(mean): --  RR: 18 (05 Aug 2019 19:00) (16 - 20)  SpO2: 98% (05 Aug 2019 16:00) (97% - 99%)      I&O's Detail    04 Aug 2019 07:01  -  05 Aug 2019 07:00  --------------------------------------------------------  IN:    Oral Fluid: 1920 mL  Total IN: 1920 mL    OUT:    Voided: 1775 mL  Total OUT: 1775 mL    Total NET: 145 mL      05 Aug 2019 07:01  -  06 Aug 2019 03:06  --------------------------------------------------------  IN:    Oral Fluid: 1450 mL  Total IN: 1450 mL    OUT:    Voided: 325 mL  Total OUT: 325 mL    Total NET: 1125 mL                MEDICATIONS  (STANDING):  amLODIPine   Tablet 10 milliGRAM(s) Oral daily  chlorhexidine 2% Cloths 1 Application(s) Topical daily  doxazosin 14 milliGRAM(s) Oral at bedtime  enoxaparin Injectable 30 milliGRAM(s) SubCutaneous two times a day  hydrALAZINE 50 milliGRAM(s) Oral every 8 hours  hydrochlorothiazide 12.5 milliGRAM(s) Oral daily  lisinopril 7.5 milliGRAM(s) Oral daily    MEDICATIONS  (PRN):  acetaminophen   Tablet .. 650 milliGRAM(s) Oral every 6 hours PRN Temp greater or equal to 38C (100.4F), Mild Pain (1 - 3)      NUTRITION/IVF: Reg diet / IVL    PHYSICAL EXAM:     Gen: NAD, Well appearing, No cyanosis, Pallor.    Eyes: PERRL ~ 3mm, EOMI,     Neurological: A&Ox3, GCS 15, No focal deficit.     ENMT: Clear canals, clear throat.      Neck: Supple. NT AT, FROM no pain.  No JVD. No meningeal signs    Pulmonary: NAD, CTA, = BL .      Cardiovascular: RRR, S1, S2, No Murmurs, rubs or gallops noted.    Gastrointestinal: ND, Soft, NT.    Extremities: NT, AT, no edema, erythema or palpable cord noted.  FROM, = 2+ pulses throughout.    LABS:  CBC Full  -  ( 04 Aug 2019 06:15 )  WBC Count : 6.98 K/uL  RBC Count : 4.48 M/uL  Hemoglobin : 13.9 g/dL  Hematocrit : 38.0 %  Platelet Count - Automated : 225 K/uL  Mean Cell Volume : 84.8 fl  Mean Cell Hemoglobin : 31.0 pg  Mean Cell Hemoglobin Concentration : 36.6 gm/dL  Auto Neutrophil # : 3.72 K/uL  Auto Lymphocyte # : 2.24 K/uL  Auto Monocyte # : 0.73 K/uL  Auto Eosinophil # : 0.20 K/uL  Auto Basophil # : 0.06 K/uL  Auto Neutrophil % : 53.2 %  Auto Lymphocyte % : 32.1 %  Auto Monocyte % : 10.5 %  Auto Eosinophil % : 2.9 %  Auto Basophil % : 0.9 %    08-06    138  |  102  |  15.0  ----------------------------<  98  3.8   |  26.0  |  1.21    Ca    9.5      06 Aug 2019 04:39  Phos  5.4     08-06  Mg     2.2     08-06              RECENT CULTURES:  07-30 .Urine XXXX XXXX   Culture grew 3 or more types of organisms which indicate  collection contamination; consider recollection only if clinically  indicated.  .  TYPE: (C=Critical, N=Notification, A=Abnormal) N  TESTS:  _ Contaminated urine.  DATE/TIME CALLED: _ 08/01/2019 12:10:21  CALLED TO: Kelsey Meeks RN  READ BACK (2 Patient Identifiers)(Y/N): _ Y  READ BACK VALUES (Y/N): _ Y  CALLED BY: Kelsey Ruiz              CAPILLARY BLOOD GLUCOSE      RADIOLOGY & ADDITIONAL STUDIES:    ASSESSMENT/PLAN:  29yMale presenting with: 28 yo M with severe HTN better controlled but still moderate.  Likely secondary to Pheochromocytoma.     Neurological: Doing well.  Tylenol first line for analgesics PRN    Cardiovascular: I have increased Lisinopril today and spaced it slightly from others.  Hold parameters written.  If SBP Still persistently > 160 later tonight then Cardura should be increased to its max of 16mg at night.  If still elevated next morning then should increase Lisinopril to 10mg QD.  Once SBP more consistently < 160 then increasing doses of BB can be started. Will CW HCTZ, hydralazine and Norvasc.  Avoid caffeine    Gastrointestinal: Reg diet    Heme: Keep Lovenox 30 BID    ID: No need for ABX.  No UTI    Endo: FU Urine studies.  Appreciate ENDO recs.    Dispo: Pt stable for downgrade out of SICU      CARE TIME SPENT: 28 minutes,

## 2019-08-06 NOTE — PROGRESS NOTE ADULT - SUBJECTIVE AND OBJECTIVE BOX
HPI/OVERNIGHT EVENTS: Patient seen and examined at bedside this AM. No acute events overnight. Patient with improved BP control, still spiking SBP to 170's.   Denies headaches, blurry vision, fever, chills, nausea, vomitting, chest pain, SOB, dizziness, abd pain or any other concerning symptoms    Vital Signs Last 24 Hrs  T(C): 36.7 (05 Aug 2019 23:53), Max: 37.5 (05 Aug 2019 20:00)  T(F): 98 (05 Aug 2019 23:53), Max: 99.5 (05 Aug 2019 20:00)  HR: 75 (06 Aug 2019 06:00) (75 - 122)  BP: 129/85 (06 Aug 2019 04:00) (129/85 - 177/88)  BP(mean): 82 (06 Aug 2019 04:00) (82 - 124)  RR: 18 (05 Aug 2019 19:00) (16 - 20)  SpO2: 98% (05 Aug 2019 16:00) (98% - 99%)    I&O's Detail    05 Aug 2019 07:01  -  06 Aug 2019 07:00  --------------------------------------------------------  IN:    Oral Fluid: 1450 mL  Total IN: 1450 mL    OUT:    Voided: 1125 mL  Total OUT: 1125 mL    Total NET: 325 mL              Constitutional: patient resting comfortably in bed, in no acute distress  HEENT: EOMI / PERRL b/l   Neck: No JVD, full ROM without pain  Respiratory: CTAB respirations are unlabored, no accessory muscle use, no conversational dyspnea  Cardiovascular: regular rate & rhythm   Gastrointestinal: Abdomen soft, non-tender, non-distended, no rebound tenderness / guarding  Incision/Wound: Clean, dry and intact  Neurological: GCS: 15 (4/5/6). A&O x 3; no gross sensory / motor / coordination deficits  Musculoskeletal: No joint pain, swelling or deformity; no limitation of movement    LABS:    08-06    138  |  102  |  15.0  ----------------------------<  98  3.8   |  26.0  |  1.21    Ca    9.5      06 Aug 2019 04:39  Phos  5.4     08-06  Mg     2.2     08-06            MEDICATIONS  (STANDING):  amLODIPine   Tablet 10 milliGRAM(s) Oral daily  chlorhexidine 2% Cloths 1 Application(s) Topical daily  doxazosin 8 milliGRAM(s) Oral <User Schedule>  enoxaparin Injectable 30 milliGRAM(s) SubCutaneous two times a day  hydrALAZINE 50 milliGRAM(s) Oral every 8 hours  hydrochlorothiazide 12.5 milliGRAM(s) Oral daily  lisinopril 7.5 milliGRAM(s) Oral <User Schedule>    MEDICATIONS  (PRN):  acetaminophen   Tablet .. 650 milliGRAM(s) Oral every 6 hours PRN Temp greater or equal to 38C (100.4F), Mild Pain (1 - 3)

## 2019-08-06 NOTE — PROGRESS NOTE ADULT - ASSESSMENT
29 year old male presenting with diaphoresis, intermittent HA, urinary frequency, severely elevated BP and intermittent tachycardia likely 2/2 Pheochromocytoma.     Neurological: GCS 15, Tylenol PRN for pain    Cardiovascular: Appreciate SICU management, agree with Doxazosin 8mg BID, continue amlodipine 10mg, HCTZ 12.5mg, Lisinopril 7.5mg, hydralazine 50mg     Gastrointestinal: Reg diet    Heme: Lovenox 30 BID    ID: UTI resolved, no need for abx    Endo: pending urine studies sendouts.    Dispo: possible SICU downgrade

## 2019-08-07 LAB
ANION GAP SERPL CALC-SCNC: 12 MMOL/L — SIGNIFICANT CHANGE UP (ref 5–17)
BUN SERPL-MCNC: 15 MG/DL — SIGNIFICANT CHANGE UP (ref 8–20)
CALCIUM SERPL-MCNC: 9.5 MG/DL — SIGNIFICANT CHANGE UP (ref 8.6–10.2)
CHLORIDE SERPL-SCNC: 101 MMOL/L — SIGNIFICANT CHANGE UP (ref 98–107)
CO2 SERPL-SCNC: 26 MMOL/L — SIGNIFICANT CHANGE UP (ref 22–29)
CREAT SERPL-MCNC: 1.28 MG/DL — SIGNIFICANT CHANGE UP (ref 0.5–1.3)
GLUCOSE SERPL-MCNC: 92 MG/DL — SIGNIFICANT CHANGE UP (ref 70–115)
MAGNESIUM SERPL-MCNC: 2.2 MG/DL — SIGNIFICANT CHANGE UP (ref 1.6–2.6)
PHOSPHATE SERPL-MCNC: 5.1 MG/DL — HIGH (ref 2.4–4.7)
POTASSIUM SERPL-MCNC: 3.8 MMOL/L — SIGNIFICANT CHANGE UP (ref 3.5–5.3)
POTASSIUM SERPL-SCNC: 3.8 MMOL/L — SIGNIFICANT CHANGE UP (ref 3.5–5.3)
SODIUM SERPL-SCNC: 139 MMOL/L — SIGNIFICANT CHANGE UP (ref 135–145)

## 2019-08-07 PROCEDURE — 99232 SBSQ HOSP IP/OBS MODERATE 35: CPT

## 2019-08-07 RX ADMIN — Medication 50 MILLIGRAM(S): at 21:10

## 2019-08-07 RX ADMIN — ENOXAPARIN SODIUM 30 MILLIGRAM(S): 100 INJECTION SUBCUTANEOUS at 17:22

## 2019-08-07 RX ADMIN — LISINOPRIL 7.5 MILLIGRAM(S): 2.5 TABLET ORAL at 10:50

## 2019-08-07 RX ADMIN — Medication 8 MILLIGRAM(S): at 21:10

## 2019-08-07 RX ADMIN — Medication 50 MILLIGRAM(S): at 13:59

## 2019-08-07 RX ADMIN — AMLODIPINE BESYLATE 10 MILLIGRAM(S): 2.5 TABLET ORAL at 06:44

## 2019-08-07 RX ADMIN — ENOXAPARIN SODIUM 30 MILLIGRAM(S): 100 INJECTION SUBCUTANEOUS at 06:44

## 2019-08-07 RX ADMIN — CHLORHEXIDINE GLUCONATE 1 APPLICATION(S): 213 SOLUTION TOPICAL at 13:58

## 2019-08-07 RX ADMIN — Medication 50 MILLIGRAM(S): at 06:44

## 2019-08-07 RX ADMIN — Medication 8 MILLIGRAM(S): at 07:51

## 2019-08-07 NOTE — PROGRESS NOTE ADULT - SUBJECTIVE AND OBJECTIVE BOX
No acute events overnight. downgraded from the SICU without issues overnight. continues on current blood pressure medications. tolerating diet without abdominal pain. denies fevers, chills, headache, blurry vision, palpitations, chest pain       MEDICATIONS  (STANDING):  amLODIPine   Tablet 10 milliGRAM(s) Oral daily  chlorhexidine 2% Cloths 1 Application(s) Topical daily  doxazosin 8 milliGRAM(s) Oral <User Schedule>  enoxaparin Injectable 30 milliGRAM(s) SubCutaneous two times a day  hydrALAZINE 50 milliGRAM(s) Oral every 8 hours  lisinopril 7.5 milliGRAM(s) Oral <User Schedule>    MEDICATIONS  (PRN):  acetaminophen   Tablet .. 650 milliGRAM(s) Oral every 6 hours PRN Temp greater or equal to 38C (100.4F), Mild Pain (1 - 3)      Vital Signs Last 24 Hrs  T(C): 36.8 (07 Aug 2019 15:33), Max: 36.9 (07 Aug 2019 06:41)  T(F): 98.3 (07 Aug 2019 15:33), Max: 98.4 (07 Aug 2019 06:41)  HR: 98 (07 Aug 2019 15:33) (73 - 116)  BP: 140/85 (07 Aug 2019 15:33) (140/85 - 161/108)  BP(mean): 113 (06 Aug 2019 18:00) (113 - 113)  RR: 20 (07 Aug 2019 15:33) (18 - 20)  SpO2: 97% (07 Aug 2019 15:33) (96% - 100%)    Physical Exam:    general: no acute distress, AOx3  Respiratory: Breath Sounds equal & clear to auscultation, no accessory muscle use  Cardiovascular: Regular rate & rhythm, normal S1, S2; no murmurs, gallops or rubs  Gastrointestinal: Soft, non-tender, normal bowel sounds  Extremities: No peripheral edema, No cyanosis, clubbing     Vascular: Equal and normal pulses: 2+ peripheral pulses throughout    Musculoskeletal: No joint pain, swelling or deformity; no limitation of movement    Skin: No rashes      I&O's Detail    06 Aug 2019 07:01  -  07 Aug 2019 07:00  --------------------------------------------------------  IN:    Oral Fluid: 3280 mL  Total IN: 3280 mL    OUT:    Voided: 600 mL  Total OUT: 600 mL    Total NET: 2680 mL          LABS:    08-07    139  |  101  |  15.0  ----------------------------<  92  3.8   |  26.0  |  1.28    Ca    9.5      07 Aug 2019 07:29  Phos  5.1     08-07  Mg     2.2     08-07            RADIOLOGY & ADDITIONAL STUDIES:

## 2019-08-07 NOTE — PROGRESS NOTE ADULT - ASSESSMENT
29 year old male with long standing history of uncontrolled hypertension with likely pheochromocytoma from left adrenal mass. currently with hypertension controlled on current medication regimen    -continue anti-hypertensive medications  -maintain normotensive  -continue regular diet  -plan for likely operative intervention sometime next week. exact date of operation for removal of left adrenal mass TBD  -follow up urine metanephrines/normetanephrines

## 2019-08-08 DIAGNOSIS — D35.00 BENIGN NEOPLASM OF UNSPECIFIED ADRENAL GLAND: ICD-10-CM

## 2019-08-08 LAB
ANION GAP SERPL CALC-SCNC: 10 MMOL/L — SIGNIFICANT CHANGE UP (ref 5–17)
BUN SERPL-MCNC: 14 MG/DL — SIGNIFICANT CHANGE UP (ref 8–20)
CALCIUM SERPL-MCNC: 9.4 MG/DL — SIGNIFICANT CHANGE UP (ref 8.6–10.2)
CHLORIDE SERPL-SCNC: 104 MMOL/L — SIGNIFICANT CHANGE UP (ref 98–107)
CO2 SERPL-SCNC: 27 MMOL/L — SIGNIFICANT CHANGE UP (ref 22–29)
CREAT SERPL-MCNC: 1.15 MG/DL — SIGNIFICANT CHANGE UP (ref 0.5–1.3)
DOPAMINE - URINE 24 HOUR: 503 UG/24 HR — SIGNIFICANT CHANGE UP (ref 0–510)
DOPAMINE UR-MCNC: 201 UG/L — SIGNIFICANT CHANGE UP
EPINEPH UR-MCNC: 11 UG/L — SIGNIFICANT CHANGE UP
EPINEPHRINE - URINE, 24 HOUR: 28 UG/24 HR — HIGH (ref 0–20)
GLUCOSE SERPL-MCNC: 84 MG/DL — SIGNIFICANT CHANGE UP (ref 70–115)
MAGNESIUM SERPL-MCNC: 2.3 MG/DL — SIGNIFICANT CHANGE UP (ref 1.6–2.6)
NOREPINEPH UR-MCNC: 58 UG/L — SIGNIFICANT CHANGE UP
NOREPINEPHRINE - URINE, 24 HOUR: 145 UG/24 HR — HIGH (ref 0–135)
PHOSPHATE SERPL-MCNC: 5.1 MG/DL — HIGH (ref 2.4–4.7)
POTASSIUM SERPL-MCNC: 4.2 MMOL/L — SIGNIFICANT CHANGE UP (ref 3.5–5.3)
POTASSIUM SERPL-SCNC: 4.2 MMOL/L — SIGNIFICANT CHANGE UP (ref 3.5–5.3)
SODIUM SERPL-SCNC: 141 MMOL/L — SIGNIFICANT CHANGE UP (ref 135–145)

## 2019-08-08 RX ORDER — HEPARIN SODIUM 5000 [USP'U]/ML
5000 INJECTION INTRAVENOUS; SUBCUTANEOUS EVERY 8 HOURS
Refills: 0 | Status: DISCONTINUED | OUTPATIENT
Start: 2019-08-08 | End: 2019-08-09

## 2019-08-08 RX ORDER — PROPRANOLOL HCL 160 MG
10 CAPSULE, EXTENDED RELEASE 24HR ORAL EVERY 6 HOURS
Refills: 0 | Status: DISCONTINUED | OUTPATIENT
Start: 2019-08-08 | End: 2019-08-09

## 2019-08-08 RX ADMIN — Medication 50 MILLIGRAM(S): at 21:49

## 2019-08-08 RX ADMIN — ENOXAPARIN SODIUM 30 MILLIGRAM(S): 100 INJECTION SUBCUTANEOUS at 05:46

## 2019-08-08 RX ADMIN — Medication 8 MILLIGRAM(S): at 21:48

## 2019-08-08 RX ADMIN — AMLODIPINE BESYLATE 10 MILLIGRAM(S): 2.5 TABLET ORAL at 05:46

## 2019-08-08 RX ADMIN — HEPARIN SODIUM 5000 UNIT(S): 5000 INJECTION INTRAVENOUS; SUBCUTANEOUS at 13:17

## 2019-08-08 RX ADMIN — Medication 10 MILLIGRAM(S): at 23:42

## 2019-08-08 RX ADMIN — Medication 50 MILLIGRAM(S): at 05:46

## 2019-08-08 RX ADMIN — Medication 50 MILLIGRAM(S): at 13:13

## 2019-08-08 RX ADMIN — Medication 10 MILLIGRAM(S): at 13:12

## 2019-08-08 RX ADMIN — CHLORHEXIDINE GLUCONATE 1 APPLICATION(S): 213 SOLUTION TOPICAL at 11:28

## 2019-08-08 RX ADMIN — Medication 10 MILLIGRAM(S): at 17:34

## 2019-08-08 RX ADMIN — Medication 8 MILLIGRAM(S): at 08:15

## 2019-08-08 RX ADMIN — HEPARIN SODIUM 5000 UNIT(S): 5000 INJECTION INTRAVENOUS; SUBCUTANEOUS at 21:48

## 2019-08-08 RX ADMIN — LISINOPRIL 7.5 MILLIGRAM(S): 2.5 TABLET ORAL at 11:28

## 2019-08-08 NOTE — PROGRESS NOTE ADULT - PROBLEM SELECTOR PLAN 1
Continue current blood pressure medication regimen  discuss with attending when surgery will take place  DVT ppx  oob  incentive spirometer

## 2019-08-08 NOTE — PROGRESS NOTE ADULT - SUBJECTIVE AND OBJECTIVE BOX
OVERNIGHT EVENTS: Patient had no acute events overnight, patient only question " When is my surgery going to be" Blood pressure controlled well on current regimen       amLODIPine   Tablet 10 milliGRAM(s) Oral daily  doxazosin 8 milliGRAM(s) Oral <User Schedule>  enoxaparin Injectable 30 milliGRAM(s) SubCutaneous two times a day  hydrALAZINE 50 milliGRAM(s) Oral every 8 hours  lisinopril 7.5 milliGRAM(s) Oral <User Schedule>      Vital Signs Last 24 Hrs  T(C): 36.6 (08 Aug 2019 00:21), Max: 36.9 (07 Aug 2019 06:41)  T(F): 97.9 (08 Aug 2019 00:21), Max: 98.4 (07 Aug 2019 06:41)  HR: 98 (08 Aug 2019 00:21) (73 - 115)  BP: 125/72 (08 Aug 2019 00:21) (125/72 - 161/108)  BP(mean): --  RR: 18 (08 Aug 2019 00:21) (18 - 20)  SpO2: 98% (08 Aug 2019 00:21) (96% - 98%)  I&O's Detail    06 Aug 2019 07:01  -  07 Aug 2019 07:00  --------------------------------------------------------  IN:    Oral Fluid: 3280 mL  Total IN: 3280 mL    OUT:    Voided: 600 mL  Total OUT: 600 mL    Total NET: 2680 mL      07 Aug 2019 07:01  -  08 Aug 2019 00:48  --------------------------------------------------------  IN:    Oral Fluid: 240 mL  Total IN: 240 mL    OUT:  Total OUT: 0 mL    Total NET: 240 mL          General: NAD, resting comfortably in bed  C/V: NSR  Pulm: Nonlabored breathing, no respiratory distress  Abd: soft, NT/ND.  Extrem: WWP, no edema, SCDs in place    LABS:    08-07    139  |  101  |  15.0  ----------------------------<  92  3.8   |  26.0  |  1.28    Ca    9.5      07 Aug 2019 07:29  Phos  5.1     08-07  Mg     2.2     08-07            RADIOLOGY & ADDITIONAL STUDIES:

## 2019-08-09 ENCOUNTER — TRANSCRIPTION ENCOUNTER (OUTPATIENT)
Age: 29
End: 2019-08-09

## 2019-08-09 VITALS
SYSTOLIC BLOOD PRESSURE: 134 MMHG | RESPIRATION RATE: 18 BRPM | DIASTOLIC BLOOD PRESSURE: 78 MMHG | TEMPERATURE: 97 F | HEART RATE: 92 BPM

## 2019-08-09 DIAGNOSIS — Z71.89 OTHER SPECIFIED COUNSELING: ICD-10-CM

## 2019-08-09 LAB
ANION GAP SERPL CALC-SCNC: 8 MMOL/L — SIGNIFICANT CHANGE UP (ref 5–17)
BLD GP AB SCN SERPL QL: SIGNIFICANT CHANGE UP
BUN SERPL-MCNC: 14 MG/DL — SIGNIFICANT CHANGE UP (ref 8–20)
CALCIUM SERPL-MCNC: 9.3 MG/DL — SIGNIFICANT CHANGE UP (ref 8.6–10.2)
CHLORIDE SERPL-SCNC: 101 MMOL/L — SIGNIFICANT CHANGE UP (ref 98–107)
CO2 SERPL-SCNC: 28 MMOL/L — SIGNIFICANT CHANGE UP (ref 22–29)
CREAT SERPL-MCNC: 1.2 MG/DL — SIGNIFICANT CHANGE UP (ref 0.5–1.3)
GLUCOSE SERPL-MCNC: 99 MG/DL — SIGNIFICANT CHANGE UP (ref 70–115)
HCT VFR BLD CALC: 34.2 % — LOW (ref 39–50)
HGB BLD-MCNC: 12.4 G/DL — LOW (ref 13–17)
MAGNESIUM SERPL-MCNC: 2.2 MG/DL — SIGNIFICANT CHANGE UP (ref 1.6–2.6)
MCHC RBC-ENTMCNC: 31.3 PG — SIGNIFICANT CHANGE UP (ref 27–34)
MCHC RBC-ENTMCNC: 36.3 GM/DL — HIGH (ref 32–36)
MCV RBC AUTO: 86.4 FL — SIGNIFICANT CHANGE UP (ref 80–100)
PHOSPHATE SERPL-MCNC: 4.5 MG/DL — SIGNIFICANT CHANGE UP (ref 2.4–4.7)
PLATELET # BLD AUTO: 227 K/UL — SIGNIFICANT CHANGE UP (ref 150–400)
POTASSIUM SERPL-MCNC: 3.8 MMOL/L — SIGNIFICANT CHANGE UP (ref 3.5–5.3)
POTASSIUM SERPL-SCNC: 3.8 MMOL/L — SIGNIFICANT CHANGE UP (ref 3.5–5.3)
RBC # BLD: 3.96 M/UL — LOW (ref 4.2–5.8)
RBC # FLD: 11.3 % — SIGNIFICANT CHANGE UP (ref 10.3–14.5)
SODIUM SERPL-SCNC: 137 MMOL/L — SIGNIFICANT CHANGE UP (ref 135–145)
WBC # BLD: 6.77 K/UL — SIGNIFICANT CHANGE UP (ref 3.8–10.5)
WBC # FLD AUTO: 6.77 K/UL — SIGNIFICANT CHANGE UP (ref 3.8–10.5)

## 2019-08-09 PROCEDURE — 70450 CT HEAD/BRAIN W/O DYE: CPT

## 2019-08-09 PROCEDURE — 82330 ASSAY OF CALCIUM: CPT

## 2019-08-09 PROCEDURE — 83970 ASSAY OF PARATHORMONE: CPT

## 2019-08-09 PROCEDURE — 86316 IMMUNOASSAY TUMOR OTHER: CPT

## 2019-08-09 PROCEDURE — 83735 ASSAY OF MAGNESIUM: CPT

## 2019-08-09 PROCEDURE — 96375 TX/PRO/DX INJ NEW DRUG ADDON: CPT

## 2019-08-09 PROCEDURE — 83835 ASSAY OF METANEPHRINES: CPT

## 2019-08-09 PROCEDURE — 99285 EMERGENCY DEPT VISIT HI MDM: CPT | Mod: 25

## 2019-08-09 PROCEDURE — 82308 ASSAY OF CALCITONIN: CPT

## 2019-08-09 PROCEDURE — 93306 TTE W/DOPPLER COMPLETE: CPT

## 2019-08-09 PROCEDURE — 82384 ASSAY THREE CATECHOLAMINES: CPT

## 2019-08-09 PROCEDURE — 96374 THER/PROPH/DIAG INJ IV PUSH: CPT

## 2019-08-09 PROCEDURE — 36415 COLL VENOUS BLD VENIPUNCTURE: CPT

## 2019-08-09 PROCEDURE — 84100 ASSAY OF PHOSPHORUS: CPT

## 2019-08-09 PROCEDURE — 82088 ASSAY OF ALDOSTERONE: CPT

## 2019-08-09 PROCEDURE — 82310 ASSAY OF CALCIUM: CPT

## 2019-08-09 PROCEDURE — 80053 COMPREHEN METABOLIC PANEL: CPT

## 2019-08-09 PROCEDURE — 76536 US EXAM OF HEAD AND NECK: CPT

## 2019-08-09 PROCEDURE — 93005 ELECTROCARDIOGRAM TRACING: CPT

## 2019-08-09 PROCEDURE — 84443 ASSAY THYROID STIM HORMONE: CPT

## 2019-08-09 PROCEDURE — 84244 ASSAY OF RENIN: CPT

## 2019-08-09 PROCEDURE — 84436 ASSAY OF TOTAL THYROXINE: CPT

## 2019-08-09 PROCEDURE — 86900 BLOOD TYPING SEROLOGIC ABO: CPT

## 2019-08-09 PROCEDURE — 83036 HEMOGLOBIN GLYCOSYLATED A1C: CPT

## 2019-08-09 PROCEDURE — 86850 RBC ANTIBODY SCREEN: CPT

## 2019-08-09 PROCEDURE — 85027 COMPLETE CBC AUTOMATED: CPT

## 2019-08-09 PROCEDURE — 86901 BLOOD TYPING SEROLOGIC RH(D): CPT

## 2019-08-09 PROCEDURE — 87086 URINE CULTURE/COLONY COUNT: CPT

## 2019-08-09 PROCEDURE — 80048 BASIC METABOLIC PNL TOTAL CA: CPT

## 2019-08-09 PROCEDURE — 74182 MRI ABDOMEN W/CONTRAST: CPT

## 2019-08-09 PROCEDURE — 84480 ASSAY TRIIODOTHYRONINE (T3): CPT

## 2019-08-09 PROCEDURE — 81001 URINALYSIS AUTO W/SCOPE: CPT

## 2019-08-09 RX ORDER — DOXAZOSIN MESYLATE 4 MG
1 TABLET ORAL
Qty: 60 | Refills: 0
Start: 2019-08-09 | End: 2019-09-07

## 2019-08-09 RX ORDER — ATENOLOL 25 MG/1
1 TABLET ORAL
Qty: 60 | Refills: 0
Start: 2019-08-09 | End: 2019-09-07

## 2019-08-09 RX ORDER — AMLODIPINE BESYLATE 2.5 MG/1
1 TABLET ORAL
Qty: 30 | Refills: 0
Start: 2019-08-09 | End: 2019-09-07

## 2019-08-09 RX ORDER — HYDRALAZINE HCL 50 MG
1 TABLET ORAL
Qty: 90 | Refills: 0
Start: 2019-08-09 | End: 2019-09-07

## 2019-08-09 RX ORDER — LISINOPRIL 2.5 MG/1
3 TABLET ORAL
Qty: 90 | Refills: 0
Start: 2019-08-09 | End: 2019-09-07

## 2019-08-09 RX ORDER — POTASSIUM CHLORIDE 20 MEQ
20 PACKET (EA) ORAL ONCE
Refills: 0 | Status: COMPLETED | OUTPATIENT
Start: 2019-08-09 | End: 2019-08-09

## 2019-08-09 RX ADMIN — Medication 8 MILLIGRAM(S): at 09:05

## 2019-08-09 RX ADMIN — Medication 50 MILLIGRAM(S): at 13:19

## 2019-08-09 RX ADMIN — Medication 10 MILLIGRAM(S): at 12:19

## 2019-08-09 RX ADMIN — Medication 50 MILLIGRAM(S): at 05:40

## 2019-08-09 RX ADMIN — HEPARIN SODIUM 5000 UNIT(S): 5000 INJECTION INTRAVENOUS; SUBCUTANEOUS at 05:40

## 2019-08-09 RX ADMIN — AMLODIPINE BESYLATE 10 MILLIGRAM(S): 2.5 TABLET ORAL at 05:40

## 2019-08-09 RX ADMIN — CHLORHEXIDINE GLUCONATE 1 APPLICATION(S): 213 SOLUTION TOPICAL at 12:19

## 2019-08-09 RX ADMIN — HEPARIN SODIUM 5000 UNIT(S): 5000 INJECTION INTRAVENOUS; SUBCUTANEOUS at 13:19

## 2019-08-09 RX ADMIN — LISINOPRIL 7.5 MILLIGRAM(S): 2.5 TABLET ORAL at 09:06

## 2019-08-09 RX ADMIN — Medication 20 MILLIEQUIVALENT(S): at 12:21

## 2019-08-09 RX ADMIN — Medication 10 MILLIGRAM(S): at 05:40

## 2019-08-09 NOTE — PROGRESS NOTE ADULT - PROVIDER SPECIALTY LIST ADULT
Cardiology
Cardiology
Endocrinology
Internal Medicine
SICU
Surgery
Cardiology
Surgery

## 2019-08-09 NOTE — DISCHARGE NOTE PROVIDER - HOSPITAL COURSE
28yo M with PMHx of controlled hypertension, single episode seizure(3/2019) due severe elevated BP with normal MRI brain and EEG, former smoker 1/2ppd presented to ED c/o constant left hand 2-4 digits tinglings that started last night with associated headache and urinary frequency. Denies fever, chills, dysuria, blurry visions, cp, sob, weakness. Pt with uncontrolled HTN on Labetalol hydralazine and amlodipine and states he takes his medication as prescribed. Admits to smoking 1/2PPD in the past but quit 4month ago. In the ED /112, labs pertinent for UA +nitrite, wbc urine 11-25, MRI abdomen with cont showed questionable slight nodular thickening of the left adrenal gland measuring up to 1.2 cm. pt also has elevated metanephrine level on last admission 3/2019 and renal US negative for renal artery stenosis.          Patient was initially admitted to medicine and a surgical consult was placed hospital day 2. Patient was started on cardura for blood pressure control. Imaging of thyroid and parathyroid did not demonstrate any masses. Cardiology and endocrinology were involved in management of blood pressure control. Patient was eventually transferred to the surgical ICU later that day for closer monitoring of blood pressure and it was still persistently elevated. He was started on a cardene drip for better control along with oral antihypertensive agents. Patient was initially started on rocephin on admission for presumed urinary tract infection, however this antibiotic was stopped after 2 days after sample was noted to be contaminated and patient was asymptomatic. Over the remainder of course, patient was able to be weaned off cardene drip and oral antihypertensives were added and titrated. He was able to be downgraded to the surgical floor with control of blood pressure. on the day of discharge, patient was tolerating a regular diet, voiding spontaneoulsy and had blood pressure controlled without tachycardia        Patient is scheduled for surgery for left adrenalectomy with Dr. Jaramillo on Thursday August,  15, 2019. Patient will be arriving as a same day surgery patient. Patient is aware of the plans.

## 2019-08-09 NOTE — PROGRESS NOTE ADULT - ASSESSMENT
29 year old male with long standing history of uncontrolled hypertension with likely pheochromocytoma from left adrenal mass. Hypertension controlled on current medication regimen.    -continue anti-hypertensive medications  -consider changing to long acting Beta-blockade, atenolol, on 8/9  -maintain normotensive  -continue regular diet  -plan for likely operative intervention next Thursday 8/15  -direct patient to PST upon discharge  -follow up urine metanephrines/normetanephrines

## 2019-08-09 NOTE — DISCHARGE NOTE PROVIDER - CARE PROVIDER_API CALL
Armand Jaramillo)  Surgery; Surgical Oncology  [address] [place] [address]  Phone: [phone]  Fax: [phone]  Follow Up Time: 2 weeks

## 2019-08-09 NOTE — DISCHARGE NOTE PROVIDER - NSDCACTIVITY_GEN_ALL_CORE
Walking - Indoors allowed/Driving allowed/No restrictions/Return to Work/School allowed/Showering allowed/Walking - Outdoors allowed

## 2019-08-09 NOTE — DISCHARGE NOTE PROVIDER - CARE PROVIDERS DIRECT ADDRESSES
[email or web address]

## 2019-08-09 NOTE — PROGRESS NOTE ADULT - REASON FOR ADMISSION
uncontrolled hypertension

## 2019-08-09 NOTE — DISCHARGE NOTE PROVIDER - NSDCCPCAREPLAN_GEN_ALL_CORE_FT
PRINCIPAL DISCHARGE DIAGNOSIS  Diagnosis: Adrenal mass  Assessment and Plan of Treatment: plan for left adrenalectomy with Dr. Jaramillo August 15, 2019 as same day surgery      SECONDARY DISCHARGE DIAGNOSES  Diagnosis: HTN (hypertension)  Assessment and Plan of Treatment: etiology from left adrenal mass. continue with all antihypertensive medications prescribed: lisinopril, amlodipine, cardura, atenolol, hydralazine

## 2019-08-09 NOTE — DISCHARGE NOTE NURSING/CASE MANAGEMENT/SOCIAL WORK - NSDCDPATPORTLINK_GEN_ALL_CORE
You can access the [facility] Portal, offered by [facility], by registering with the following website: [email or web address]

## 2019-08-09 NOTE — PROGRESS NOTE ADULT - SUBJECTIVE AND OBJECTIVE BOX
HPI/OVERNIGHT EVENTS: No acute events overnight. Patient started on metoprolol yesterday. HR maintained below 100 overnight. No complaints today.     Vital Signs Last 24 Hrs  T(C): 36.7 (08 Aug 2019 16:00), Max: 36.7 (08 Aug 2019 16:00)  T(F): 98.1 (08 Aug 2019 16:00), Max: 98.1 (08 Aug 2019 16:00)  HR: 84 (08 Aug 2019 23:40) (76 - 107)  BP: 161/92 (08 Aug 2019 23:40) (125/72 - 161/92)  BP(mean): --  RR: 20 (08 Aug 2019 23:40) (18 - 20)  SpO2: 100% (08 Aug 2019 23:40) (98% - 100%)    I&O's Detail    07 Aug 2019 07:01  -  08 Aug 2019 07:00  --------------------------------------------------------  IN:    Oral Fluid: 480 mL  Total IN: 480 mL    OUT:  Total OUT: 0 mL    Total NET: 480 mL      08 Aug 2019 07:01  -  09 Aug 2019 00:15  --------------------------------------------------------  IN:    Oral Fluid: 240 mL  Total IN: 240 mL    OUT:  Total OUT: 0 mL    Total NET: 240 mL      Constitutional: well nourished patient in no acute distress  Respiratory: CTAB respirations are unlabored, no accessory muscle use, no conversational dyspnea  Cardiovascular: regular rate & rhythm   Gastrointestinal: Abdomen soft, non-tender, non-distended  Neurological: A&O x 3; no gross sensory / motor / coordination deficits      LABS:    08-08    141  |  104  |  14.0  ----------------------------<  84  4.2   |  27.0  |  1.15    Ca    9.4      08 Aug 2019 07:36  Phos  5.1     08-08  Mg     2.3     08-08      MEDICATIONS  (STANDING):  amLODIPine   Tablet 10 milliGRAM(s) Oral daily  chlorhexidine 2% Cloths 1 Application(s) Topical daily  doxazosin 8 milliGRAM(s) Oral <User Schedule>  heparin  Injectable 5000 Unit(s) SubCutaneous every 8 hours  hydrALAZINE 50 milliGRAM(s) Oral every 8 hours  lisinopril 7.5 milliGRAM(s) Oral <User Schedule>  propranolol 10 milliGRAM(s) Oral every 6 hours    MEDICATIONS  (PRN):  acetaminophen   Tablet .. 650 milliGRAM(s) Oral every 6 hours PRN Temp greater or equal to 38C (100.4F), Mild Pain (1 - 3)

## 2019-08-13 ENCOUNTER — INPATIENT (INPATIENT)
Facility: HOSPITAL | Age: 29
LOS: 4 days | Discharge: HOME | DRG: 614 | End: 2019-08-18
Attending: SURGERY | Admitting: SURGERY
Payer: COMMERCIAL

## 2019-08-13 VITALS
OXYGEN SATURATION: 97 % | HEART RATE: 76 BPM | WEIGHT: 315 LBS | SYSTOLIC BLOOD PRESSURE: 140 MMHG | HEIGHT: 72 IN | RESPIRATION RATE: 18 BRPM | TEMPERATURE: 98 F | DIASTOLIC BLOOD PRESSURE: 86 MMHG

## 2019-08-13 DIAGNOSIS — D35.00 BENIGN NEOPLASM OF UNSPECIFIED ADRENAL GLAND: ICD-10-CM

## 2019-08-13 LAB
ALBUMIN SERPL ELPH-MCNC: 3.7 G/DL — SIGNIFICANT CHANGE UP (ref 3.3–5.2)
ALP SERPL-CCNC: 39 U/L — LOW (ref 40–120)
ALT FLD-CCNC: 46 U/L — HIGH
ANION GAP SERPL CALC-SCNC: 9 MMOL/L — SIGNIFICANT CHANGE UP (ref 5–17)
APTT BLD: 32.9 SEC — SIGNIFICANT CHANGE UP (ref 27.5–36.3)
AST SERPL-CCNC: 19 U/L — SIGNIFICANT CHANGE UP
BASOPHILS # BLD AUTO: 0.06 K/UL — SIGNIFICANT CHANGE UP (ref 0–0.2)
BASOPHILS NFR BLD AUTO: 0.7 % — SIGNIFICANT CHANGE UP (ref 0–2)
BILIRUB SERPL-MCNC: 0.2 MG/DL — LOW (ref 0.4–2)
BLD GP AB SCN SERPL QL: SIGNIFICANT CHANGE UP
BUN SERPL-MCNC: 13 MG/DL — SIGNIFICANT CHANGE UP (ref 8–20)
CALCIUM SERPL-MCNC: 9.4 MG/DL — SIGNIFICANT CHANGE UP (ref 8.6–10.2)
CHLORIDE SERPL-SCNC: 106 MMOL/L — SIGNIFICANT CHANGE UP (ref 98–107)
CO2 SERPL-SCNC: 27 MMOL/L — SIGNIFICANT CHANGE UP (ref 22–29)
CREAT SERPL-MCNC: 1.13 MG/DL — SIGNIFICANT CHANGE UP (ref 0.5–1.3)
EOSINOPHIL # BLD AUTO: 0.18 K/UL — SIGNIFICANT CHANGE UP (ref 0–0.5)
EOSINOPHIL NFR BLD AUTO: 2.2 % — SIGNIFICANT CHANGE UP (ref 0–6)
GLUCOSE SERPL-MCNC: 88 MG/DL — SIGNIFICANT CHANGE UP (ref 70–115)
HCT VFR BLD CALC: 34 % — LOW (ref 39–50)
HGB BLD-MCNC: 12.4 G/DL — LOW (ref 13–17)
IMM GRANULOCYTES NFR BLD AUTO: 0.7 % — SIGNIFICANT CHANGE UP (ref 0–1.5)
INR BLD: 0.89 RATIO — SIGNIFICANT CHANGE UP (ref 0.88–1.16)
LYMPHOCYTES # BLD AUTO: 2.6 K/UL — SIGNIFICANT CHANGE UP (ref 1–3.3)
LYMPHOCYTES # BLD AUTO: 32.5 % — SIGNIFICANT CHANGE UP (ref 13–44)
MAGNESIUM SERPL-MCNC: 2 MG/DL — SIGNIFICANT CHANGE UP (ref 1.6–2.6)
MCHC RBC-ENTMCNC: 31.7 PG — SIGNIFICANT CHANGE UP (ref 27–34)
MCHC RBC-ENTMCNC: 36.5 GM/DL — HIGH (ref 32–36)
MCV RBC AUTO: 87 FL — SIGNIFICANT CHANGE UP (ref 80–100)
MONOCYTES # BLD AUTO: 0.67 K/UL — SIGNIFICANT CHANGE UP (ref 0–0.9)
MONOCYTES NFR BLD AUTO: 8.4 % — SIGNIFICANT CHANGE UP (ref 2–14)
NEUTROPHILS # BLD AUTO: 4.44 K/UL — SIGNIFICANT CHANGE UP (ref 1.8–7.4)
NEUTROPHILS NFR BLD AUTO: 55.5 % — SIGNIFICANT CHANGE UP (ref 43–77)
PHOSPHATE SERPL-MCNC: 4.3 MG/DL — SIGNIFICANT CHANGE UP (ref 2.4–4.7)
PLATELET # BLD AUTO: 233 K/UL — SIGNIFICANT CHANGE UP (ref 150–400)
POTASSIUM SERPL-MCNC: 4.1 MMOL/L — SIGNIFICANT CHANGE UP (ref 3.5–5.3)
POTASSIUM SERPL-SCNC: 4.1 MMOL/L — SIGNIFICANT CHANGE UP (ref 3.5–5.3)
PROT SERPL-MCNC: 6.5 G/DL — LOW (ref 6.6–8.7)
PROTHROM AB SERPL-ACNC: 10.2 SEC — SIGNIFICANT CHANGE UP (ref 10–12.9)
RBC # BLD: 3.91 M/UL — LOW (ref 4.2–5.8)
RBC # FLD: 11.9 % — SIGNIFICANT CHANGE UP (ref 10.3–14.5)
SODIUM SERPL-SCNC: 142 MMOL/L — SIGNIFICANT CHANGE UP (ref 135–145)
WBC # BLD: 8.01 K/UL — SIGNIFICANT CHANGE UP (ref 3.8–10.5)
WBC # FLD AUTO: 8.01 K/UL — SIGNIFICANT CHANGE UP (ref 3.8–10.5)

## 2019-08-13 PROCEDURE — 99223 1ST HOSP IP/OBS HIGH 75: CPT

## 2019-08-13 PROCEDURE — 71045 X-RAY EXAM CHEST 1 VIEW: CPT | Mod: 26

## 2019-08-13 PROCEDURE — 99285 EMERGENCY DEPT VISIT HI MDM: CPT

## 2019-08-13 PROCEDURE — 93010 ELECTROCARDIOGRAM REPORT: CPT

## 2019-08-13 PROCEDURE — 99222 1ST HOSP IP/OBS MODERATE 55: CPT

## 2019-08-13 RX ORDER — AMLODIPINE BESYLATE 2.5 MG/1
10 TABLET ORAL DAILY
Refills: 0 | Status: DISCONTINUED | OUTPATIENT
Start: 2019-08-13 | End: 2019-08-15

## 2019-08-13 RX ORDER — LISINOPRIL 2.5 MG/1
7.5 TABLET ORAL DAILY
Refills: 0 | Status: DISCONTINUED | OUTPATIENT
Start: 2019-08-13 | End: 2019-08-15

## 2019-08-13 RX ORDER — HYDRALAZINE HCL 50 MG
50 TABLET ORAL EVERY 8 HOURS
Refills: 0 | Status: DISCONTINUED | OUTPATIENT
Start: 2019-08-13 | End: 2019-08-15

## 2019-08-13 RX ORDER — SODIUM CHLORIDE 9 MG/ML
1000 INJECTION INTRAMUSCULAR; INTRAVENOUS; SUBCUTANEOUS
Refills: 0 | Status: DISCONTINUED | OUTPATIENT
Start: 2019-08-13 | End: 2019-08-15

## 2019-08-13 RX ORDER — CHLORHEXIDINE GLUCONATE 213 G/1000ML
1 SOLUTION TOPICAL
Refills: 0 | Status: DISCONTINUED | OUTPATIENT
Start: 2019-08-13 | End: 2019-08-15

## 2019-08-13 RX ORDER — DOXAZOSIN MESYLATE 4 MG
8 TABLET ORAL EVERY 12 HOURS
Refills: 0 | Status: DISCONTINUED | OUTPATIENT
Start: 2019-08-13 | End: 2019-08-15

## 2019-08-13 RX ORDER — ATENOLOL 25 MG/1
50 TABLET ORAL EVERY 12 HOURS
Refills: 0 | Status: DISCONTINUED | OUTPATIENT
Start: 2019-08-13 | End: 2019-08-15

## 2019-08-13 RX ORDER — ENOXAPARIN SODIUM 100 MG/ML
40 INJECTION SUBCUTANEOUS EVERY 24 HOURS
Refills: 0 | Status: DISCONTINUED | OUTPATIENT
Start: 2019-08-13 | End: 2019-08-15

## 2019-08-13 RX ORDER — ACETAMINOPHEN 500 MG
650 TABLET ORAL EVERY 6 HOURS
Refills: 0 | Status: DISCONTINUED | OUTPATIENT
Start: 2019-08-13 | End: 2019-08-13

## 2019-08-13 RX ADMIN — Medication 8 MILLIGRAM(S): at 21:59

## 2019-08-13 RX ADMIN — SODIUM CHLORIDE 150 MILLILITER(S): 9 INJECTION INTRAMUSCULAR; INTRAVENOUS; SUBCUTANEOUS at 16:38

## 2019-08-13 RX ADMIN — Medication 50 MILLIGRAM(S): at 16:47

## 2019-08-13 RX ADMIN — ATENOLOL 50 MILLIGRAM(S): 25 TABLET ORAL at 22:00

## 2019-08-13 RX ADMIN — Medication 50 MILLIGRAM(S): at 21:59

## 2019-08-13 RX ADMIN — ENOXAPARIN SODIUM 40 MILLIGRAM(S): 100 INJECTION SUBCUTANEOUS at 18:34

## 2019-08-13 NOTE — ED ADULT TRIAGE NOTE - CHIEF COMPLAINT QUOTE
pt A&ox4, was sent by PCP for eval of blood pressure "Russell suppose have surgery on Thursday and I need testing", resp even and unlabored no distress noted

## 2019-08-13 NOTE — H&P ADULT - ASSESSMENT
28 yo M w/ chronic HTN, MRI revealing left adrenal lesion and hx of elevated urine metanephrines concerning for pheochromocytoma    Admit to Surg Onc  Labs pending  Coags pending  RD  IVF - Normal saline  Pain control  Call medicine for clearance  Call cardiology for clearance  Resume home BP medications  Preop for robotic assisted laparoscopic adrenalectomy, possible open on 8/15

## 2019-08-13 NOTE — H&P ADULT - NSHPPHYSICALEXAM_GEN_ALL_CORE
PE  Gen: AOX3, NAD  Pulm: Non labored breathing, CTAB  CV: RRR, s1 and s2  Abd: Soft, ND, NT  Ext: Moving all 4 extremities  Vasc: +2 radial pulses bilaterally  Neuro: Good affect

## 2019-08-13 NOTE — CONSULT NOTE ADULT - ATTENDING COMMENTS
Patient seen and examined by me.  I have discussed my recommendation with the PA which are outlined above.  Patient walked 30 minutes from his home to train station, 2.5 miles distance, no exercise induced chest pain or dyspnea.  Patients perioperative cardiac risk assessment to be addressed after coronary CTA  Will follow

## 2019-08-13 NOTE — CONSULT NOTE ADULT - ASSESSMENT
This is 30yo Man with PMHx of hypertension, recently diagnosed with Pheo,  MRI revealed questionable slight nodular thickening of the left adrenal gland measuring up to 1.2 cm. Presents to the ED today for prep of elective robotic assisted laparoscopic adrenalectomy on 8/15. Medicine consulted for clearance     A/P    >Pheochromocytoma   schedule for surgery on Thursday   BP is stable - c/w current medication   Per patient he can walk several blocks and can climb multiple flight of stairs without any problem  check coagulation profile - pending  TTE showed LVEf of 70-75%, moderate LVH  cardiology consult noted - New T wave inversions noted on EKG,  Obtain Cardiac CTA  clearance pending INR and CTA    >HTN - c/w current medication  per cardio -  Consider transitioning to phenoxybenzamine     >Obesity - low fat diet     >DVT PPX - per surgery

## 2019-08-13 NOTE — CONSULT NOTE ADULT - ASSESSMENT
A/P: 28 y/o M with a PMHx of HTN with elevated urine metanephrine levels and slight nodular thickening of the left adrenal gland concerning for pheochromocytoma, single episode seizure (03/19) due to severely elevated BP with normal MRI brain and EEG who presents to the ED today for planned elective robotic assisted laparoscopic adrenalectomy on 08/15/19. Patient states that his blood pressure has been better controlled at home, and he is feeling very well. Patient with METS>4 without any chest pain or shortness of breath. Patient states that he has been taking all of his medications as prescribed. Patient denies any fevers, chills, CP, SOB, palpitations, LE edema, abdominal pain, N/V/D, headache, or dizziness.   Bloodwork pending    1. [facility]ell-operative Cardiac Risk Stratification   - New T wave inversions noted on EKG  - Obtain Cardiac CTA, already on atenolol   - Recent echo with normal BiV function and no significant valvular abnormalities   - Clearance pending CTA results and medication optimization     2. HTN  - Continue current medication regimen  - Consider transitioning to phenoxybenzamine   - Continue to monitor closely       Preliminary evaluation, please await official recommendations by Dr. Nuñez A/P: 28 y/o M with a PMHx of HTN with elevated urine metanephrine levels and slight nodular thickening of the left adrenal gland concerning for pheochromocytoma, single episode seizure (03/19) due to severely elevated BP with normal MRI brain and EEG who presents to the ED today for planned elective robotic assisted laparoscopic adrenalectomy on 08/15/19. Patient states that his blood pressure has been better controlled at home, and he is feeling very well. Patient with METS>4 without any chest pain or shortness of breath. Patient states that he has been taking all of his medications as prescribed. Patient denies any fevers, chills, CP, SOB, palpitations, LE edema, abdominal pain, N/V/D, headache, or dizziness.   Bloodwork pending    1. Shantell-operative Cardiac Risk Stratification   - New T wave inversions noted on EKG  - Obtain Cardiac CTA, already on atenolol   - Recent echo with normal BiV function and no significant valvular abnormalities   - Clearance pending CTA results and medication optimization     2. HTN  - Continue current medication regimen  - Consider transitioning to phenoxybenzamine   - Continue to monitor closely

## 2019-08-13 NOTE — CONSULT NOTE ADULT - SUBJECTIVE AND OBJECTIVE BOX
Patient is a 29y old  Male who presents with a chief complaint of Pheo/Chronic HTN (13 Aug 2019 13:31)      HPI: 28 y/o M with a PMHx of HTN with elevated urine metanephrine levels and slight nodular thickening of the left adrenal gland concerning for pheochromocytoma, single episode seizure (03/19) due to severely elevated BP with normal MRI brain and EEG who presents to the ED today for planned elective robotic assisted laparoscopic adrenalectomy on 08/15/19.       PAST MEDICAL & SURGICAL HISTORY:  HTN (hypertension)  No significant past surgical history      PREVIOUS DIAGNOSTIC TESTING:      ECHO  FINDINGS:  < from: TTE Echo Complete w/Doppler (07.31.19 @ 17:54) >  PHYSICIAN INTERPRETATION:  Left Ventricle: The left ventricular internal cavity size is normal.  Global LV systolic function was hyperdynamic. Left ventricular ejection   fraction, by visual estimation, is 70 to 75%. Spectral Doppler shows   impaired relaxation pattern of left ventricular myocardial filling (Grade   I diastolic dysfunction).  Right Ventricle: Normal right ventricular size and function. TV S' 0.2   m/s.  Left Atrium: The left atrium is normal in size.  Right Atrium: The right atrium is normal in size.  Pericardium: There is no evidence of pericardial effusion.  Mitral Valve: Structurally normal mitral valve, with normal leaflet   excursion.  Tricuspid Valve: The tricuspid valve is normal in structure. Mild   tricuspid regurgitation is visualized.  Aortic Valve: Normal trileaflet aortic valve with normal opening. The   aortic valve is trileaflet. Peak transaortic gradient equals 9.0 mmHg,   mean transaortic gradient equals 5.0 mmHg, the calculated aortic valve   area equals 3.20 cm² by the continuity equation consistent with normally   opening aortic valve.  Pulmonic Valve: Structurally normal pulmonic valve, with normal leaflet   excursion.  Aorta: The aortic root is normal in size and structure.  Pulmonary Artery: The main pulmonary artery is normal in size.  Venous: A normal flow pattern is recorded from the right upper pulmonary   vein. The inferior vena cava is normal. The inferior vena cava was normal   sized, with respiratory size variation greater than 50%.       Summary:   1. There is moderate concentric left ventricular hypertrophy.   2. Hyperdynamic global left ventricular systolic function.   3. Left ventricular ejection fraction, by visual estimation, is 70 to   75%.   4. Spectral Doppler shows impaired relaxation pattern of left   ventricular myocardial filling (Grade I diastolic dysfunction).   5. Normal right ventricular size and function.   6. The left atrium is normal in size.   7. The right atrium is normal in size.   8. Structurally normal mitral valve, with normal leaflet excursion.   9. Normal trileaflet aortic valve with normal opening.  10. There is no evidence of pericardial effusion.    MD Roxana Electronically signed on 8/1/2019 at 8:56:03 AM    < end of copied text >    Allergies    No Known Allergies    Intolerances    MEDICATIONS  (STANDING):  amLODIPine   Tablet 10 milliGRAM(s) Oral daily  ATENolol  Tablet 50 milliGRAM(s) Oral every 12 hours  chlorhexidine 2% Cloths 1 Application(s) Topical <User Schedule>  doxazosin Oral Tab/Cap - Peds 8 milliGRAM(s) Oral every 12 hours  enoxaparin Injectable 40 milliGRAM(s) SubCutaneous every 24 hours  hydrALAZINE 50 milliGRAM(s) Oral every 8 hours  lisinopril Oral Tab/Cap - Peds 7.5 milliGRAM(s) Oral daily  sodium chloride 0.9%. 1000 milliLiter(s) (150 mL/Hr) IV Continuous <Continuous>    MEDICATIONS  (PRN):    Home Medications:  acetaminophen 325 mg oral tablet: 2 tab(s) orally every 6 hours, As needed, Mild Pain (1 - 3) (30 Jul 2019 22:42)    FAMILY HISTORY:  FH: HTN (hypertension)  Father- CHF in his 50s    SOCIAL HISTORY:       CIGARETTES:   Former smoker, quit 4 months ago. Smoked 1/2 ppd x 10 years    ALCOHOL: Socially    DRUG ABUSE: Marijuana use.     REVIEW OF SYSTEMS:  CONSTITUTIONAL: No fever, weight loss, or fatigue  EYES: No eye pain, visual disturbances, or discharge  ENMT:  No difficulty hearing, tinnitus, vertigo; No sinus or throat pain  NECK: No pain or stiffness  RESPIRATORY: No cough, wheezing, chills or hemoptysis; No Shortness of Breath  CARDIOVASCULAR: No chest pain, palpitations, passing out, dizziness, or leg swelling  GASTROINTESTINAL: No abdominal or epigastric pain. No nausea, vomiting, or hematemesis; No diarrhea or constipation. No melena or hematochezia.  GENITOURINARY: No dysuria, frequency, hematuria, or incontinence  NEUROLOGICAL: No headaches, memory loss, loss of strength, numbness, or tremors  SKIN: No itching, burning, rashes, or lesions   LYMPH Nodes: No enlarged glands  ENDOCRINE: No heat or cold intolerance; No hair loss  MUSCULOSKELETAL: No joint pain or swelling; No muscle, back, or extremity pain  PSYCHIATRIC: No depression, anxiety, mood swings, or difficulty sleeping  HEME/LYMPH: No easy bruising, or bleeding gums  ALLERY AND IMMUNOLOGIC: No hives or eczema	      REVIEW OF SYMPTOMS:   Cardiovascular:     chest pain,  dyspnea,  syncope,    palpitaitons,      dizziness,    Orthopnea,      Paroxsymal nocturnal dyspnea;  Respiratory:    Dyspnea,   cough,   ;   Genitourinary:  No dysuria, no hematuria;   Gastrointestinal:   No dark color stool, no melena, no diarrhea, no constipation, no abdominal pain;   Neurological: No headache, no dizziness, no slurred speech;    Psychiatric: No agitation, no anxiety.    ALL OTHER REVIEW OF SYSTEMS ARE NEGATIVE.    Vital Signs Last 24 Hrs  T(C): 36.9 (13 Aug 2019 12:56), Max: 36.9 (13 Aug 2019 12:56)  T(F): 98.5 (13 Aug 2019 12:56), Max: 98.5 (13 Aug 2019 12:56)  HR: 76 (13 Aug 2019 12:56) (76 - 76)  BP: 140/86 (13 Aug 2019 12:56) (140/86 - 140/86)  BP(mean): --  RR: 18 (13 Aug 2019 12:56) (18 - 18)  SpO2: 97% (13 Aug 2019 12:56) (97% - 97%)    Daily Height in cm: 182.88 (13 Aug 2019 12:56)      PHYSICAL EXAM:  Appearance: Normal, well nourished	  HEENT:   Normal oral mucosa, PERRL, EOMI, sclera non-icteric	  Lymphatic: No cervical lymphadenopathy  Cardiovascular: Normal S1 S2, No JVD, No cardiac murmurs, No carotid bruits, No peripheral edema  Respiratory: Lungs clear to auscultation	  Psychiatry: A & O x 3, Mood & affect appropriate  Gastrointestinal:  Soft, Non-tender, + BS, no bruits	  Skin: No rashes, No ecchymoses, No cyanosis  Neurologic: Grossly non-focal with full strength in all four extremities  Extremities: Normal range of motion, No clubbing, cyanosis or edema  Vascular: Peripheral pulses palpable 2+ bilaterally      INTERPRETATION OF TELEMETRY:    ECG: Pending    LABS: Pending                  I&O's Summary    BNP    RADIOLOGY & ADDITIONAL STUDIES:  < from: MR Abdomen w/ IV Cont (07.30.19 @ 18:52) >  COMPARISON: CTA abdomen and pelvis 3/12/2019.    FINDINGS:    LOWER CHEST: Clear.    LIVER: Normal.  BILE DUCTS: Nondilated.  GALLBLADDER: Normal.  SPLEEN: Normal.  PANCREAS: Normal.  ADRENALS: Questionable slight nodular thickening of the left adrenal   gland measuring up to 1.2 cm (4; 22).  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    VISUALIZED PORTIONS:    BOWEL: No bowel-related abnormality.  PERITONEUM: No ascites.  VESSELS:  Normal caliber aorta.  RETROPERITONEUM: No adenopathy or mass.    ABDOMINAL WALL: Normal.  BONES: No aggressive lesion.    IMPRESSION:    Questionable slight nodular thickening of the left adrenal gland   measuring up to 1.2 cm.     No other retroperitoneal mass to suggest extra adrenal paraganglioma.      < end of copied text > Patient is a 29y old  Male who presents with a chief complaint of Pheo/Chronic HTN (13 Aug 2019 13:31)      HPI: 28 y/o M with a PMHx of HTN with elevated urine metanephrine levels and slight nodular thickening of the left adrenal gland concerning for pheochromocytoma, single episode seizure (03/19) due to severely elevated BP with normal MRI brain and EEG who presents to the ED today for planned elective robotic assisted laparoscopic adrenalectomy on 08/15/19. Patient states that his blood pressure has been better controlled at home, and he is feeling very well. Patient with METS>4 without any chest pain or shortness of breath. Patient states that he has been taking all of his medications as prescribed. Patient denies any fevers, chills, CP, SOB, palpitations, LE edema, abdominal pain, N/V/D, headache, or dizziness.       PAST MEDICAL & SURGICAL HISTORY:  HTN (hypertension)  No significant past surgical history      PREVIOUS DIAGNOSTIC TESTING:      ECHO  FINDINGS:  < from: TTE Echo Complete w/Doppler (07.31.19 @ 17:54) >  PHYSICIAN INTERPRETATION:  Left Ventricle: The left ventricular internal cavity size is normal.  Global LV systolic function was hyperdynamic. Left ventricular ejection   fraction, by visual estimation, is 70 to 75%. Spectral Doppler shows   impaired relaxation pattern of left ventricular myocardial filling (Grade   I diastolic dysfunction).  Right Ventricle: Normal right ventricular size and function. TV S' 0.2   m/s.  Left Atrium: The left atrium is normal in size.  Right Atrium: The right atrium is normal in size.  Pericardium: There is no evidence of pericardial effusion.  Mitral Valve: Structurally normal mitral valve, with normal leaflet   excursion.  Tricuspid Valve: The tricuspid valve is normal in structure. Mild   tricuspid regurgitation is visualized.  Aortic Valve: Normal trileaflet aortic valve with normal opening. The   aortic valve is trileaflet. Peak transaortic gradient equals 9.0 mmHg,   mean transaortic gradient equals 5.0 mmHg, the calculated aortic valve   area equals 3.20 cm² by the continuity equation consistent with normally   opening aortic valve.  Pulmonic Valve: Structurally normal pulmonic valve, with normal leaflet   excursion.  Aorta: The aortic root is normal in size and structure.  Pulmonary Artery: The main pulmonary artery is normal in size.  Venous: A normal flow pattern is recorded from the right upper pulmonary   vein. The inferior vena cava is normal. The inferior vena cava was normal   sized, with respiratory size variation greater than 50%.       Summary:   1. There is moderate concentric left ventricular hypertrophy.   2. Hyperdynamic global left ventricular systolic function.   3. Left ventricular ejection fraction, by visual estimation, is 70 to   75%.   4. Spectral Doppler shows impaired relaxation pattern of left   ventricular myocardial filling (Grade I diastolic dysfunction).   5. Normal right ventricular size and function.   6. The left atrium is normal in size.   7. The right atrium is normal in size.   8. Structurally normal mitral valve, with normal leaflet excursion.   9. Normal trileaflet aortic valve with normal opening.  10. There is no evidence of pericardial effusion.    MD Roxana Electronically signed on 8/1/2019 at 8:56:03 AM    < end of copied text >    Allergies    No Known Allergies    Intolerances    MEDICATIONS  (STANDING):  amLODIPine   Tablet 10 milliGRAM(s) Oral daily  ATENolol  Tablet 50 milliGRAM(s) Oral every 12 hours  chlorhexidine 2% Cloths 1 Application(s) Topical <User Schedule>  doxazosin Oral Tab/Cap - Peds 8 milliGRAM(s) Oral every 12 hours  enoxaparin Injectable 40 milliGRAM(s) SubCutaneous every 24 hours  hydrALAZINE 50 milliGRAM(s) Oral every 8 hours  lisinopril Oral Tab/Cap - Peds 7.5 milliGRAM(s) Oral daily  sodium chloride 0.9%. 1000 milliLiter(s) (150 mL/Hr) IV Continuous <Continuous>    MEDICATIONS  (PRN):    Home Medications:  acetaminophen 325 mg oral tablet: 2 tab(s) orally every 6 hours, As needed, Mild Pain (1 - 3) (30 Jul 2019 22:42)    FAMILY HISTORY:  FH: HTN (hypertension)  Father- CHF in his 50s    SOCIAL HISTORY:       CIGARETTES:   Former smoker, quit 4 months ago. Smoked 1/2 ppd x 10 years    ALCOHOL: Socially    DRUG ABUSE: Marijuana use.     REVIEW OF SYSTEMS:  CONSTITUTIONAL: No fever, weight loss, or fatigue  EYES: No eye pain, visual disturbances, or discharge  ENMT:  No difficulty hearing, tinnitus, vertigo; No sinus or throat pain  NECK: No pain or stiffness  RESPIRATORY: No cough, wheezing, chills or hemoptysis; No Shortness of Breath  CARDIOVASCULAR: No chest pain, palpitations, passing out, dizziness, or leg swelling  GASTROINTESTINAL: No abdominal or epigastric pain. No nausea, vomiting, or hematemesis; No diarrhea or constipation. No melena or hematochezia.  GENITOURINARY: No dysuria, frequency, hematuria, or incontinence  NEUROLOGICAL: No headaches, memory loss, loss of strength, numbness, or tremors  SKIN: No itching, burning, rashes, or lesions   LYMPH Nodes: No enlarged glands  ENDOCRINE: No heat or cold intolerance; No hair loss  MUSCULOSKELETAL: No joint pain or swelling; No muscle, back, or extremity pain  PSYCHIATRIC: No depression, anxiety, mood swings, or difficulty sleeping  HEME/LYMPH: No easy bruising, or bleeding gums  ALLERY AND IMMUNOLOGIC: No hives or eczema	      REVIEW OF SYMPTOMS:   Cardiovascular:     chest pain,  dyspnea,  syncope,    palpitaitons,      dizziness,    Orthopnea,      Paroxsymal nocturnal dyspnea;  Respiratory:    Dyspnea,   cough,   ;   Genitourinary:  No dysuria, no hematuria;   Gastrointestinal:   No dark color stool, no melena, no diarrhea, no constipation, no abdominal pain;   Neurological: No headache, no dizziness, no slurred speech;    Psychiatric: No agitation, no anxiety.    ALL OTHER REVIEW OF SYSTEMS ARE NEGATIVE.    Vital Signs Last 24 Hrs  T(C): 36.9 (13 Aug 2019 12:56), Max: 36.9 (13 Aug 2019 12:56)  T(F): 98.5 (13 Aug 2019 12:56), Max: 98.5 (13 Aug 2019 12:56)  HR: 76 (13 Aug 2019 12:56) (76 - 76)  BP: 140/86 (13 Aug 2019 12:56) (140/86 - 140/86)  BP(mean): --  RR: 18 (13 Aug 2019 12:56) (18 - 18)  SpO2: 97% (13 Aug 2019 12:56) (97% - 97%)    Daily Height in cm: 182.88 (13 Aug 2019 12:56)      PHYSICAL EXAM:  Appearance: Normal, well nourished	  HEENT:   Normal oral mucosa, PERRL, EOMI, sclera non-icteric	  Lymphatic: No cervical lymphadenopathy  Cardiovascular: Normal S1 S2, No JVD, No cardiac murmurs, No carotid bruits, No peripheral edema  Respiratory: Lungs clear to auscultation	  Psychiatry: A & O x 3, Mood & affect appropriate  Gastrointestinal:  Soft, Non-tender, + BS, no bruits	  Skin: No rashes, No ecchymoses, No cyanosis  Neurologic: Grossly non-focal with full strength in all four extremities  Extremities: Normal range of motion, No clubbing, cyanosis or edema  Vascular: Peripheral pulses palpable 2+ bilaterally      INTERPRETATION OF TELEMETRY:    ECG: SR 1st degree AV block, worsening T wave inversions inferolaterally     LABS: Pending                  I&O's Summary    BNP    RADIOLOGY & ADDITIONAL STUDIES:  < from: MR Abdomen w/ IV Cont (07.30.19 @ 18:52) >  COMPARISON: CTA abdomen and pelvis 3/12/2019.    FINDINGS:    LOWER CHEST: Clear.    LIVER: Normal.  BILE DUCTS: Nondilated.  GALLBLADDER: Normal.  SPLEEN: Normal.  PANCREAS: Normal.  ADRENALS: Questionable slight nodular thickening of the left adrenal   gland measuring up to 1.2 cm (4; 22).  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    VISUALIZED PORTIONS:    BOWEL: No bowel-related abnormality.  PERITONEUM: No ascites.  VESSELS:  Normal caliber aorta.  RETROPERITONEUM: No adenopathy or mass.    ABDOMINAL WALL: Normal.  BONES: No aggressive lesion.    IMPRESSION:    Questionable slight nodular thickening of the left adrenal gland   measuring up to 1.2 cm.     No other retroperitoneal mass to suggest extra adrenal paraganglioma.      < end of copied text > Patient is a 29y old  Male who presents with a chief complaint of Pheo/Chronic HTN (13 Aug 2019 13:31)      HPI: 30 y/o M with a PMHx of HTN with elevated urine metanephrine levels and slight nodular thickening of the left adrenal gland concerning for pheochromocytoma, single episode seizure (03/19) due to severely elevated BP with normal MRI brain and EEG who presents to the ED today for planned elective robotic assisted laparoscopic adrenalectomy on 08/15/19. Patient states that his blood pressure has been better controlled at home, and he is feeling very well. Patient with METS>4 without any chest pain or shortness of breath. Patient states that he has been taking all of his medications as prescribed. Patient denies any fevers, chills, CP, SOB, palpitations, LE edema, abdominal pain, N/V/D, headache, or dizziness.       PAST MEDICAL & SURGICAL HISTORY:  HTN (hypertension)  No significant past surgical history      PREVIOUS DIAGNOSTIC TESTING:      ECHO  FINDINGS:  < from: TTE Echo Complete w/Doppler (07.31.19 @ 17:54) >  PHYSICIAN INTERPRETATION:  Left Ventricle: The left ventricular internal cavity size is normal.  Global LV systolic function was hyperdynamic. Left ventricular ejection   fraction, by visual estimation, is 70 to 75%. Spectral Doppler shows   impaired relaxation pattern of left ventricular myocardial filling (Grade   I diastolic dysfunction).  Right Ventricle: Normal right ventricular size and function. TV S' 0.2   m/s.  Left Atrium: The left atrium is normal in size.  Right Atrium: The right atrium is normal in size.  Pericardium: There is no evidence of pericardial effusion.  Mitral Valve: Structurally normal mitral valve, with normal leaflet   excursion.  Tricuspid Valve: The tricuspid valve is normal in structure. Mild   tricuspid regurgitation is visualized.  Aortic Valve: Normal trileaflet aortic valve with normal opening. The   aortic valve is trileaflet. Peak transaortic gradient equals 9.0 mmHg,   mean transaortic gradient equals 5.0 mmHg, the calculated aortic valve   area equals 3.20 cm² by the continuity equation consistent with normally   opening aortic valve.  Pulmonic Valve: Structurally normal pulmonic valve, with normal leaflet   excursion.  Aorta: The aortic root is normal in size and structure.  Pulmonary Artery: The main pulmonary artery is normal in size.  Venous: A normal flow pattern is recorded from the right upper pulmonary   vein. The inferior vena cava is normal. The inferior vena cava was normal   sized, with respiratory size variation greater than 50%.       Summary:   1. There is moderate concentric left ventricular hypertrophy.   2. Hyperdynamic global left ventricular systolic function.   3. Left ventricular ejection fraction, by visual estimation, is 70 to   75%.   4. Spectral Doppler shows impaired relaxation pattern of left   ventricular myocardial filling (Grade I diastolic dysfunction).   5. Normal right ventricular size and function.   6. The left atrium is normal in size.   7. The right atrium is normal in size.   8. Structurally normal mitral valve, with normal leaflet excursion.   9. Normal trileaflet aortic valve with normal opening.  10. There is no evidence of pericardial effusion.    MD Roxana Electronically signed on 8/1/2019 at 8:56:03 AM    < end of copied text >    Allergies    No Known Allergies    Intolerances    MEDICATIONS  (STANDING):  amLODIPine   Tablet 10 milliGRAM(s) Oral daily  ATENolol  Tablet 50 milliGRAM(s) Oral every 12 hours  chlorhexidine 2% Cloths 1 Application(s) Topical <User Schedule>  doxazosin Oral Tab/Cap - Peds 8 milliGRAM(s) Oral every 12 hours  enoxaparin Injectable 40 milliGRAM(s) SubCutaneous every 24 hours  hydrALAZINE 50 milliGRAM(s) Oral every 8 hours  lisinopril Oral Tab/Cap - Peds 7.5 milliGRAM(s) Oral daily  sodium chloride 0.9%. 1000 milliLiter(s) (150 mL/Hr) IV Continuous <Continuous>    MEDICATIONS  (PRN):    Home Medications:  acetaminophen 325 mg oral tablet: 2 tab(s) orally every 6 hours, As needed, Mild Pain (1 - 3) (30 Jul 2019 22:42)    FAMILY HISTORY:  FH: HTN (hypertension)  Father- CHF in his 50s    SOCIAL HISTORY:       CIGARETTES:   Former smoker, quit 4 months ago. Smoked 1/2 ppd x 10 years    ALCOHOL: Socially    DRUG ABUSE: Marijuana use.     REVIEW OF SYSTEMS:  CONSTITUTIONAL: No fever, weight loss, or fatigue  EYES: No eye pain, visual disturbances, or discharge  ENMT:  No difficulty hearing, tinnitus, vertigo; No sinus or throat pain  NECK: No pain or stiffness  RESPIRATORY: No cough, wheezing, chills or hemoptysis; No Shortness of Breath  CARDIOVASCULAR: No chest pain, palpitations, passing out, dizziness, or leg swelling  GASTROINTESTINAL: No abdominal or epigastric pain. No nausea, vomiting, or hematemesis; No diarrhea or constipation. No melena or hematochezia.  GENITOURINARY: No dysuria, frequency, hematuria, or incontinence  NEUROLOGICAL: No headaches, memory loss, loss of strength, numbness, or tremors  SKIN: No itching, burning, rashes, or lesions   LYMPH Nodes: No enlarged glands  ENDOCRINE: No heat or cold intolerance; No hair loss  MUSCULOSKELETAL: No joint pain or swelling; No muscle, back, or extremity pain  PSYCHIATRIC: No depression, anxiety, mood swings, or difficulty sleeping  HEME/LYMPH: No easy bruising, or bleeding gums  ALLERY AND IMMUNOLOGIC: No hives or eczema	      REVIEW OF SYMPTOMS:   Cardiovascular:     chest pain,  dyspnea,  syncope,    palpitaitons,      dizziness,    Orthopnea,      Paroxsymal nocturnal dyspnea;  Respiratory:    Dyspnea,   cough,   ;   Genitourinary:  No dysuria, no hematuria;   Gastrointestinal:   No dark color stool, no melena, no diarrhea, no constipation, no abdominal pain;   Neurological: No headache, no dizziness, no slurred speech;    Psychiatric: No agitation, no anxiety.    ALL OTHER REVIEW OF SYSTEMS ARE NEGATIVE.    Vital Signs Last 24 Hrs  T(C): 36.9 (13 Aug 2019 12:56), Max: 36.9 (13 Aug 2019 12:56)  T(F): 98.5 (13 Aug 2019 12:56), Max: 98.5 (13 Aug 2019 12:56)  HR: 76 (13 Aug 2019 12:56) (76 - 76)  BP: 140/86 (13 Aug 2019 12:56) (140/86 - 140/86)  BP(mean): --  RR: 18 (13 Aug 2019 12:56) (18 - 18)  SpO2: 97% (13 Aug 2019 12:56) (97% - 97%)    Daily Height in cm: 182.88 (13 Aug 2019 12:56)      PHYSICAL EXAM:  Appearance: Normal, well nourished	  HEENT:   Normal oral mucosa, PERRL, EOMI, sclera non-icteric	  Lymphatic: No cervical lymphadenopathy  Cardiovascular: Normal S1 S2, No JVD, No cardiac murmurs, No carotid bruits, No peripheral edema  Respiratory: Lungs clear to auscultation	  Psychiatry: A & O x 3, Mood & affect appropriate  Gastrointestinal:  Soft, Non-tender, + BS, no bruits	  Skin: No rashes, No ecchymoses, No cyanosis  Neurologic: Grossly non-focal with full strength in all four extremities  Extremities: Normal range of motion, No clubbing, cyanosis or edema  Vascular: Peripheral pulses palpable 2+ bilaterally      INTERPRETATION OF TELEMETRY:    ECG: SR 1st degree AV block, LVH  worsening T wave inversions inferolaterally Q waves in V1 and V2    LABS: Pending                  I&O's Summary    BNP    RADIOLOGY & ADDITIONAL STUDIES:  < from: MR Abdomen w/ IV Cont (07.30.19 @ 18:52) >  COMPARISON: CTA abdomen and pelvis 3/12/2019.    FINDINGS:    LOWER CHEST: Clear.    LIVER: Normal.  BILE DUCTS: Nondilated.  GALLBLADDER: Normal.  SPLEEN: Normal.  PANCREAS: Normal.  ADRENALS: Questionable slight nodular thickening of the left adrenal   gland measuring up to 1.2 cm (4; 22).  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    VISUALIZED PORTIONS:    BOWEL: No bowel-related abnormality.  PERITONEUM: No ascites.  VESSELS:  Normal caliber aorta.  RETROPERITONEUM: No adenopathy or mass.    ABDOMINAL WALL: Normal.  BONES: No aggressive lesion.    IMPRESSION:    Questionable slight nodular thickening of the left adrenal gland   measuring up to 1.2 cm.     No other retroperitoneal mass to suggest extra adrenal paraganglioma.      < end of copied text >

## 2019-08-13 NOTE — CONSULT NOTE ADULT - SUBJECTIVE AND OBJECTIVE BOX
Patient is a 29y old  Male who presents with a chief complaint of Pheo/Chronic HTN (13 Aug 2019 15:20)      HPI:  30yo M with PMHx of hypertension, single episode seizure (3/2019) due to severely elevated BP with normal MRI brain and EEG presented to ED this past month with elevated BP. He had hx of long standing hx of global diaphoresis, sweaty palms and intermittent HA's. During this recent hospital admission, work-up including MRI revealed questionable slight nodular thickening of the left adrenal gland measuring up to 1.2 cm, also urine studies with elevated metanephrine levels concerning for pheochromocytoma. BP was controlled with multiple medications this past admission and was sent home. Presents to the ED today for prep of elective robotic assisted laparoscopic adrenalectomy on 8/15. Patient is doing well. Denies abdominal pain. Denies nausea/vomiting, tolerating a regular diet. Voiding at baseline. Passing flatus and having bowel movements. Denies fevers/chills.    Pt denied chest pain, SOB, abd. pain, nausea and vomiting     Pmhx: HTN  Pshx: Denies  Meds: Atenolol, amlodipine, doxazosin, hydralazine, lisinopril  Allergies: NKDA  Shx: Denies x 3 (13 Aug 2019 13:31)      PAST MEDICAL & SURGICAL HISTORY:  HTN (hypertension)  No significant past surgical history      FAMILY HISTORY:  FH: HTN (hypertension)      SOCIAL HISTORY: smoke marijuana, drink alcohol socially     Allergies    No Known Allergies    Intolerances          MEDICATIONS  (STANDING):  amLODIPine   Tablet 10 milliGRAM(s) Oral daily  ATENolol  Tablet 50 milliGRAM(s) Oral every 12 hours  chlorhexidine 2% Cloths 1 Application(s) Topical <User Schedule>  doxazosin Oral Tab/Cap - Peds 8 milliGRAM(s) Oral every 12 hours  enoxaparin Injectable 40 milliGRAM(s) SubCutaneous every 24 hours  hydrALAZINE 50 milliGRAM(s) Oral every 8 hours  lisinopril Oral Tab/Cap - Peds 7.5 milliGRAM(s) Oral daily  sodium chloride 0.9%. 1000 milliLiter(s) (150 mL/Hr) IV Continuous <Continuous>    MEDICATIONS  (PRN):      MEDICATIONS:  Antimicrobials:      Cardiovascular:  amLODIPine   Tablet 10 milliGRAM(s) Oral daily  ATENolol  Tablet 50 milliGRAM(s) Oral every 12 hours  doxazosin Oral Tab/Cap - Peds 8 milliGRAM(s) Oral every 12 hours  hydrALAZINE 50 milliGRAM(s) Oral every 8 hours  lisinopril Oral Tab/Cap - Peds 7.5 milliGRAM(s) Oral daily      Pulmonary:      Neurologic:      Oncologic:      Hematologic:  enoxaparin Injectable 40 milliGRAM(s) SubCutaneous every 24 hours      GI:      :      Endocrine/Metabolic:      Nutrition/Electrolytes:  sodium chloride 0.9%. 1000 milliLiter(s) IV Continuous <Continuous>      Immunologic:      Topical agents:  chlorhexidine 2% Cloths 1 Application(s) Topical <User Schedule>      Others:          Vital Signs Last 24 Hrs  T(C): 36.9 (13 Aug 2019 12:56), Max: 36.9 (13 Aug 2019 12:56)  T(F): 98.5 (13 Aug 2019 12:56), Max: 98.5 (13 Aug 2019 12:56)  HR: 76 (13 Aug 2019 12:56) (76 - 76)  BP: 140/86 (13 Aug 2019 12:56) (140/86 - 140/86)  BP(mean): --  RR: 18 (13 Aug 2019 12:56) (18 - 18)  SpO2: 97% (13 Aug 2019 12:56) (97% - 97%)    LABS:                RADIOLOGY & ADDITIONAL STUDIES:      REVIEW OF SYSTEMS:    Denied chest pain, SOB, palpitation, abd. pain, nausea, vomiting, headache, dizziness, numbness, tingling, urinary and bowel complaints.       PHYSICAL EXAM:    GENERAL: Obese  HEAD:  Atraumatic, Normocephalic  EYES: EOMI, PERRLA, conjunctiva and sclera clear  NECK: Supple, No JVD, Normal thyroid  CHEST/LUNG: Clear to percussion bilaterally; No rales, rhonchi, wheezing, or rubs  HEART: s1/s2 audible  ABDOMEN: Soft, Nontender, Nondistended; Bowel sounds present  EXTREMITIES:  2+ Peripheral Pulses, No clubbing, cyanosis, or edema  SKIN: No rashes or lesions  NERVOUS SYSTEM:  Alert & Oriented X3      Radiology     < from: TTE Echo Complete w/Doppler (07.31.19 @ 17:54) >    Summary:   1. There is moderate concentric left ventricular hypertrophy.   2. Hyperdynamic global left ventricular systolic function.   3. Left ventricular ejection fraction, by visual estimation, is 70 to   75%.   4. Spectral Doppler shows impaired relaxation pattern of left   ventricular myocardial filling (Grade I diastolic dysfunction).   5. Normal right ventricular size and function.   6. The left atrium is normal in size.   7. The right atrium is normal in size.   8. Structurally normal mitral valve, with normal leaflet excursion.   9. Normal trileaflet aortic valve with normal opening.  10. There is no evidence of pericardial effusion.    MD Roxana Electronically signed on 8/1/2019 at 8:56:03 AM    < end of copied text >

## 2019-08-13 NOTE — ED ADULT NURSE NOTE - OBJECTIVE STATEMENT
29 yom presents to ed offers no med complaint patient is here for cardiac clearance for procedure on thursday. abnormal ekg.  denies cp denies n/v/d denies numbness denies tingling. ambulates

## 2019-08-13 NOTE — H&P ADULT - HISTORY OF PRESENT ILLNESS
28yo M with PMHx of hypertension, single episode seizure (3/2019) due to severely elevated BP with normal MRI brain and EEG presented to ED this past month with elevated BP. He had hx of long standing hx of global diaphoresis, sweaty palms and intermittent HA's. During this recent hospital admission, work-up including MRI revealed questionable slight nodular thickening of the left adrenal gland measuring up to 1.2 cm, also urine studies with elevated metanephrine levels concerning for pheochromocytoma. BP was controlled with multiple medications this past admission and was sent home. Presents to the ED today for prep of elective robotic assisted laparoscopic adrenalectomy on 8/15. Patient is doing well. Denies abdominal pain. Denies nausea/vomiting, tolerating a regular diet. Voiding at baseline. Passing flatus and having bowel movements. Denies fevers/chills.     Pmhx: HTN  Pshx: Denies  Meds: Atenolol, amlodipine, doxazosin, hydralazine, lisinopril  Allergies: NKDA  Shx: Denies x 3

## 2019-08-13 NOTE — ED STATDOCS - PROGRESS NOTE DETAILS
30y/o with PMHx and recently diagnosed pheochromocytoma(last week) presents to the ED c/o the ED for blood testing. Pt is scheduled for surgery at [facility] in two days, and presents for pre-operational testing and BP control. Slight associated SOB. Denies nausea, vomiting, diarrhea, fever, chills.  Focused eval, protocol orders entered. Pt to be moved to main ED for complete evaluation by another provider.

## 2019-08-14 ENCOUNTER — TRANSCRIPTION ENCOUNTER (OUTPATIENT)
Age: 29
End: 2019-08-14

## 2019-08-14 LAB
ANION GAP SERPL CALC-SCNC: 10 MMOL/L — SIGNIFICANT CHANGE UP (ref 5–17)
BASOPHILS # BLD AUTO: 0.07 K/UL — SIGNIFICANT CHANGE UP (ref 0–0.2)
BASOPHILS NFR BLD AUTO: 1 % — SIGNIFICANT CHANGE UP (ref 0–2)
BUN SERPL-MCNC: 9 MG/DL — SIGNIFICANT CHANGE UP (ref 8–20)
CALCIUM SERPL-MCNC: 8.6 MG/DL — SIGNIFICANT CHANGE UP (ref 8.6–10.2)
CHLORIDE SERPL-SCNC: 102 MMOL/L — SIGNIFICANT CHANGE UP (ref 98–107)
CO2 SERPL-SCNC: 29 MMOL/L — SIGNIFICANT CHANGE UP (ref 22–29)
CREAT SERPL-MCNC: 1.06 MG/DL — SIGNIFICANT CHANGE UP (ref 0.5–1.3)
EOSINOPHIL # BLD AUTO: 0.23 K/UL — SIGNIFICANT CHANGE UP (ref 0–0.5)
EOSINOPHIL NFR BLD AUTO: 3.2 % — SIGNIFICANT CHANGE UP (ref 0–6)
GLUCOSE SERPL-MCNC: 96 MG/DL — SIGNIFICANT CHANGE UP (ref 70–115)
HCT VFR BLD CALC: 32.6 % — LOW (ref 39–50)
HGB BLD-MCNC: 11.9 G/DL — LOW (ref 13–17)
IMM GRANULOCYTES NFR BLD AUTO: 0.8 % — SIGNIFICANT CHANGE UP (ref 0–1.5)
LYMPHOCYTES # BLD AUTO: 2.95 K/UL — SIGNIFICANT CHANGE UP (ref 1–3.3)
LYMPHOCYTES # BLD AUTO: 41.2 % — SIGNIFICANT CHANGE UP (ref 13–44)
MAGNESIUM SERPL-MCNC: 1.8 MG/DL — SIGNIFICANT CHANGE UP (ref 1.6–2.6)
MCHC RBC-ENTMCNC: 31.6 PG — SIGNIFICANT CHANGE UP (ref 27–34)
MCHC RBC-ENTMCNC: 36.5 GM/DL — HIGH (ref 32–36)
MCV RBC AUTO: 86.5 FL — SIGNIFICANT CHANGE UP (ref 80–100)
MONOCYTES # BLD AUTO: 0.69 K/UL — SIGNIFICANT CHANGE UP (ref 0–0.9)
MONOCYTES NFR BLD AUTO: 9.6 % — SIGNIFICANT CHANGE UP (ref 2–14)
NEUTROPHILS # BLD AUTO: 3.16 K/UL — SIGNIFICANT CHANGE UP (ref 1.8–7.4)
NEUTROPHILS NFR BLD AUTO: 44.2 % — SIGNIFICANT CHANGE UP (ref 43–77)
PHOSPHATE SERPL-MCNC: 4.1 MG/DL — SIGNIFICANT CHANGE UP (ref 2.4–4.7)
PLATELET # BLD AUTO: 222 K/UL — SIGNIFICANT CHANGE UP (ref 150–400)
POTASSIUM SERPL-MCNC: 4 MMOL/L — SIGNIFICANT CHANGE UP (ref 3.5–5.3)
POTASSIUM SERPL-SCNC: 4 MMOL/L — SIGNIFICANT CHANGE UP (ref 3.5–5.3)
RBC # BLD: 3.77 M/UL — LOW (ref 4.2–5.8)
RBC # FLD: 11.8 % — SIGNIFICANT CHANGE UP (ref 10.3–14.5)
SODIUM SERPL-SCNC: 141 MMOL/L — SIGNIFICANT CHANGE UP (ref 135–145)
WBC # BLD: 7.16 K/UL — SIGNIFICANT CHANGE UP (ref 3.8–10.5)
WBC # FLD AUTO: 7.16 K/UL — SIGNIFICANT CHANGE UP (ref 3.8–10.5)

## 2019-08-14 PROCEDURE — 75574 CT ANGIO HRT W/3D IMAGE: CPT | Mod: 26

## 2019-08-14 PROCEDURE — 99223 1ST HOSP IP/OBS HIGH 75: CPT

## 2019-08-14 PROCEDURE — 78452 HT MUSCLE IMAGE SPECT MULT: CPT | Mod: 26

## 2019-08-14 PROCEDURE — 99232 SBSQ HOSP IP/OBS MODERATE 35: CPT

## 2019-08-14 PROCEDURE — 93018 CV STRESS TEST I&R ONLY: CPT

## 2019-08-14 PROCEDURE — 93016 CV STRESS TEST SUPVJ ONLY: CPT

## 2019-08-14 RX ORDER — MAGNESIUM SULFATE 500 MG/ML
2 VIAL (ML) INJECTION ONCE
Refills: 0 | Status: COMPLETED | OUTPATIENT
Start: 2019-08-14 | End: 2019-08-14

## 2019-08-14 RX ADMIN — AMLODIPINE BESYLATE 10 MILLIGRAM(S): 2.5 TABLET ORAL at 05:53

## 2019-08-14 RX ADMIN — Medication 50 GRAM(S): at 15:43

## 2019-08-14 RX ADMIN — Medication 8 MILLIGRAM(S): at 05:52

## 2019-08-14 RX ADMIN — ATENOLOL 50 MILLIGRAM(S): 25 TABLET ORAL at 05:53

## 2019-08-14 RX ADMIN — CHLORHEXIDINE GLUCONATE 1 APPLICATION(S): 213 SOLUTION TOPICAL at 05:53

## 2019-08-14 RX ADMIN — Medication 50 MILLIGRAM(S): at 22:07

## 2019-08-14 RX ADMIN — SODIUM CHLORIDE 150 MILLILITER(S): 9 INJECTION INTRAMUSCULAR; INTRAVENOUS; SUBCUTANEOUS at 00:30

## 2019-08-14 RX ADMIN — Medication 8 MILLIGRAM(S): at 18:50

## 2019-08-14 RX ADMIN — LISINOPRIL 7.5 MILLIGRAM(S): 2.5 TABLET ORAL at 05:53

## 2019-08-14 RX ADMIN — ENOXAPARIN SODIUM 40 MILLIGRAM(S): 100 INJECTION SUBCUTANEOUS at 18:50

## 2019-08-14 RX ADMIN — Medication 50 MILLIGRAM(S): at 05:53

## 2019-08-14 RX ADMIN — Medication 50 MILLIGRAM(S): at 17:07

## 2019-08-14 RX ADMIN — SODIUM CHLORIDE 100 MILLILITER(S): 9 INJECTION INTRAMUSCULAR; INTRAVENOUS; SUBCUTANEOUS at 17:07

## 2019-08-14 RX ADMIN — ATENOLOL 50 MILLIGRAM(S): 25 TABLET ORAL at 18:50

## 2019-08-14 NOTE — CONSULT NOTE ADULT - SUBJECTIVE AND OBJECTIVE BOX
Patient is a 29y old  Male who presents with a chief complaint of Pheo/Chronic HTN (14 Aug 2019 10:13)    HPI:  29M with PMHx of hypertension, single episode seizure (3/2019) due to severely elevated BP with normal MRI brain and EEG presented to ED this past month with elevated BP. He had hx of long standing hx of global diaphoresis, sweaty palms and intermittent HA's. During this recent hospital admission, work-up including MRI revealed questionable slight nodular thickening of the left adrenal gland measuring up to 1.2 cm, also urine studies with elevated metanephrine levels concerning for pheochromocytoma done in 3/2019 (urine 24hr metanephrines total at 1649 normetamephrines 1256 and normal plasma metanephrines but elevated normetanphrines 160). BP was controlled with multiple medications this past admission and was sent home. Presents to the ED today for prep of elective robotic assisted laparoscopic adrenalectomy on 8/15.  spoke with the radiologist- no definite adrenal adenoma and questionable thickening on MRI abdomen. no retroperitoneal paraganglioma on CT angio c/a/p. unclear if nuclear scan/Uday Medley    Pmhx: HTN  Pshx: Denies  Meds: Atenolol, amlodipine, doxazosin, hydralazine, lisinopril  Allergies: NKDA  Shx: Denies x 3 (13 Aug 2019 13:31)      PAST MEDICAL & SURGICAL HISTORY:  HTN (hypertension)  No significant past surgical history      SH: see above    FAMILY HISTORY:  FH: HTN (hypertension)        Allergies    No Known Allergies    Intolerances        REVIEW OF SYSTEMS:    CONSTITUTIONAL: No fever, weight loss, or fatigue  EYES: No eye pain, visual disturbances, or discharge  ENMT:  No difficulty hearing, tinnitus, vertigo; No sinus or throat pain  NECK: No pain or stiffness  RESPIRATORY: No cough, wheezing, chills or hemoptysis; No shortness of breath  CARDIOVASCULAR: No chest pain, palpitations, dizziness, or leg swelling  GASTROINTESTINAL: No abdominal or epigastric pain. No nausea, vomiting, or hematemesis; No diarrhea or constipation. No melena or hematochezia.  NEUROLOGICAL: No headaches, memory loss, loss of strength, numbness, or tremors  SKIN: No itching, burning, rashes, or lesions   MUSCULOSKELETAL: No joint pain or swelling; No muscle, back, or extremity pain  PSYCHIATRIC: No depression, anxiety, mood swings, or difficulty sleeping        MEDICATIONS  (STANDING):  amLODIPine   Tablet 10 milliGRAM(s) Oral daily  ATENolol  Tablet 50 milliGRAM(s) Oral every 12 hours  chlorhexidine 2% Cloths 1 Application(s) Topical <User Schedule>  doxazosin Oral Tab/Cap - Peds 8 milliGRAM(s) Oral every 12 hours  enoxaparin Injectable 40 milliGRAM(s) SubCutaneous every 24 hours  hydrALAZINE 50 milliGRAM(s) Oral every 8 hours  lisinopril Oral Tab/Cap - Peds 7.5 milliGRAM(s) Oral daily  magnesium sulfate  IVPB 2 Gram(s) IV Intermittent once  sodium chloride 0.9%. 1000 milliLiter(s) (100 mL/Hr) IV Continuous <Continuous>    MEDICATIONS  (PRN):      Vital Signs Last 24 Hrs  T(C): 36.7 (14 Aug 2019 08:28), Max: 37.1 (14 Aug 2019 00:22)  T(F): 98.1 (14 Aug 2019 08:28), Max: 98.7 (14 Aug 2019 00:22)  HR: 67 (14 Aug 2019 08:28) (67 - 81)  BP: 156/77 (14 Aug 2019 08:28) (131/81 - 162/62)  BP(mean): --  RR: 18 (14 Aug 2019 08:28) (18 - 18)  SpO2: 97% (14 Aug 2019 08:28) (96% - 98%)      PHYSICAL EXAM:    Constitutional: NAD, well-groomed, well-developed  HEENT: EOMI, no exophalmos  Neck: trachea midline, no thyroid enlargement  Respiratory: CTAB  Cardiovascular: S1 and S2, RRR  Gastrointestinal: BS+, soft, ntnd  Extremities: No peripheral edema  Neurological: A/O x 3, no focal deficits  Psychiatric: Normal mood, normal affect  Skin: No rashes, no acanthosis        LABS  08-14    141  |  102  |  9.0  ----------------------------<  96  4.0   |  29.0  |  1.06    Ca    8.6      14 Aug 2019 07:40  Phos  4.1     08-14  Mg     1.8     08-14    TPro  6.5<L>  /  Alb  3.7  /  TBili  0.2<L>  /  DBili  x   /  AST  19  /  ALT  46<H>  /  AlkPhos  39<L>  08-13                          11.9   7.16  )-----------( 222      ( 14 Aug 2019 07:40 )             32.6               Alanine Aminotransferase (ALT/SGPT): 46 U/L (08-13-19 @ 15:36)  Aspartate Aminotransferase (AST/SGOT): 19 U/L (08-13-19 @ 15:36)  Alkaline Phosphatase, Serum: 39 U/L (08-13-19 @ 15:36)  Albumin, Serum: 3.7 g/dL (08-13-19 @ 15:36)      CAPILLARY BLOOD GLUCOSE Patient is a 29y old  Male who presents with a chief complaint of Pheo/Chronic HTN (14 Aug 2019 10:13)    HPI:  29M with PMHx of hypertension, single episode seizure (3/2019) due to severely elevated BP with normal MRI brain and EEG presented to ED this past month with elevated BP. He had hx of long standing hx of global diaphoresis, sweaty palms and intermittent HA's. During this recent hospital admission, work-up including MRI revealed questionable slight nodular thickening of the left adrenal gland measuring up to 1.2 cm, also urine studies with elevated metanephrine levels concerning for pheochromocytoma done in 3/2019 (urine 24hr metanephrines total at 1649 normetamephrines 1256 and normal plasma metanephrines but elevated normetanphrines 160). BP was controlled with multiple medications this past admission and was sent home. Presents to the ED today for prep of elective robotic assisted laparoscopic adrenalectomy on 8/15.  spoke with the radiologist- no definite adrenal adenoma and questionable thickening on MRI abdomen. no retroperitoneal paraganglioma on CT angio c/a/p. unfortunately 143 MIBG scan cannot be done at [facility] and neither can a dotate PET scan  spoke with attending surgeon at length regarding best course of action  had HTN since 12 years old. patient wants to go for surgery     Pmhx: HTN  Pshx: Denies  Meds: Atenolol, amlodipine, doxazosin, hydralazine, lisinopril  Allergies: NKDA  Shx: Denies x 3 (13 Aug 2019 13:31)      PAST MEDICAL & SURGICAL HISTORY:  HTN (hypertension)  No significant past surgical history      SH: see above    FAMILY HISTORY:  FH: HTN (hypertension)        Allergies    No Known Allergies    Intolerances        REVIEW OF SYSTEMS:    CONSTITUTIONAL: No fever, weight loss, or fatigue  EYES: No eye pain, visual disturbances, or discharge  ENMT:  No difficulty hearing, tinnitus, vertigo; No sinus or throat pain  NECK: No pain or stiffness  RESPIRATORY: No cough, wheezing, chills or hemoptysis; No shortness of breath  CARDIOVASCULAR: No chest pain, palpitations, dizziness, or leg swelling  GASTROINTESTINAL: No abdominal or epigastric pain. No nausea, vomiting, or hematemesis; No diarrhea or constipation. No melena or hematochezia.  NEUROLOGICAL: No headaches, memory loss, loss of strength, numbness, or tremors  SKIN: No itching, burning, rashes, or lesions   MUSCULOSKELETAL: No joint pain or swelling; No muscle, back, or extremity pain  PSYCHIATRIC: No depression, anxiety, mood swings, or difficulty sleeping        MEDICATIONS  (STANDING):  amLODIPine   Tablet 10 milliGRAM(s) Oral daily  ATENolol  Tablet 50 milliGRAM(s) Oral every 12 hours  chlorhexidine 2% Cloths 1 Application(s) Topical <User Schedule>  doxazosin Oral Tab/Cap - Peds 8 milliGRAM(s) Oral every 12 hours  enoxaparin Injectable 40 milliGRAM(s) SubCutaneous every 24 hours  hydrALAZINE 50 milliGRAM(s) Oral every 8 hours  lisinopril Oral Tab/Cap - Peds 7.5 milliGRAM(s) Oral daily  magnesium sulfate  IVPB 2 Gram(s) IV Intermittent once  sodium chloride 0.9%. 1000 milliLiter(s) (100 mL/Hr) IV Continuous <Continuous>    MEDICATIONS  (PRN):      Vital Signs Last 24 Hrs  T(C): 36.7 (14 Aug 2019 08:28), Max: 37.1 (14 Aug 2019 00:22)  T(F): 98.1 (14 Aug 2019 08:28), Max: 98.7 (14 Aug 2019 00:22)  HR: 67 (14 Aug 2019 08:28) (67 - 81)  BP: 156/77 (14 Aug 2019 08:28) (131/81 - 162/62)  BP(mean): --  RR: 18 (14 Aug 2019 08:28) (18 - 18)  SpO2: 97% (14 Aug 2019 08:28) (96% - 98%)      PHYSICAL EXAM:    Constitutional: NAD,obese  HEENT: EOMI, no exophalmos  Neck: trachea midline, no thyroid enlargement  Respiratory: CTAB  Cardiovascular: S1 and S2, RRR  Gastrointestinal: BS+, soft, ntnd  Extremities: No peripheral edema  Neurological: A/O x 3, no focal deficits  Psychiatric: Normal mood, normal affect  Skin: No rashes, no acanthosis. multiple tattoos        LABS  08-14    141  |  102  |  9.0  ----------------------------<  96  4.0   |  29.0  |  1.06    Ca    8.6      14 Aug 2019 07:40  Phos  4.1     08-14  Mg     1.8     08-14    TPro  6.5<L>  /  Alb  3.7  /  TBili  0.2<L>  /  DBili  x   /  AST  19  /  ALT  46<H>  /  AlkPhos  39<L>  08-13                          11.9   7.16  )-----------( 222      ( 14 Aug 2019 07:40 )             32.6               Alanine Aminotransferase (ALT/SGPT): 46 U/L (08-13-19 @ 15:36)  Aspartate Aminotransferase (AST/SGOT): 19 U/L (08-13-19 @ 15:36)  Alkaline Phosphatase, Serum: 39 U/L (08-13-19 @ 15:36)  Albumin, Serum: 3.7 g/dL (08-13-19 @ 15:36)      CAPILLARY BLOOD GLUCOSE

## 2019-08-14 NOTE — PROGRESS NOTE ADULT - SUBJECTIVE AND OBJECTIVE BOX
HPI/OVERNIGHT EVENTS:  Patient seen and examined at bedside this morning. Doing very well. HTN well controlled. Has been NPO for imaging. Denies any nausea, vomiting, fever, chills, chest pain, lightheadedness or shortness of breath. Awaiting Cardiac CTA this morning for cardiac clearance to proceed with surgery on 8/15.     MEDICATIONS  (STANDING):  amLODIPine   Tablet 10 milliGRAM(s) Oral daily  ATENolol  Tablet 50 milliGRAM(s) Oral every 12 hours  chlorhexidine 2% Cloths 1 Application(s) Topical <User Schedule>  doxazosin Oral Tab/Cap - Peds 8 milliGRAM(s) Oral every 12 hours  enoxaparin Injectable 40 milliGRAM(s) SubCutaneous every 24 hours  hydrALAZINE 50 milliGRAM(s) Oral every 8 hours  lisinopril Oral Tab/Cap - Peds 7.5 milliGRAM(s) Oral daily  sodium chloride 0.9%. 1000 milliLiter(s) (150 mL/Hr) IV Continuous <Continuous>    MEDICATIONS  (PRN):      Vital Signs Last 24 Hrs  T(C): 37.1 (14 Aug 2019 00:22), Max: 37.1 (14 Aug 2019 00:22)  T(F): 98.7 (14 Aug 2019 00:22), Max: 98.7 (14 Aug 2019 00:22)  HR: 69 (14 Aug 2019 05:07) (69 - 81)  BP: 131/81 (14 Aug 2019 05:07) (131/81 - 162/62)  BP(mean): --  RR: 18 (14 Aug 2019 00:22) (18 - 18)  SpO2: 96% (14 Aug 2019 00:22) (96% - 98%)    Constitutional: patient resting comfortably in bed, in no acute distress  HEENT: EOMI / PERRL b/l, no active drainage or redness  Neck: No JVD, full ROM without pain  Respiratory: respirations are unlabored, no accessory muscle use, no conversational dyspnea  Cardiovascular: regular rate & rhythm  Gastrointestinal: Abdomen soft, non-tender, non-distended, no rebound tenderness / guarding  Neurological: GCS: 15 (4/5/6). A&O x 3; no gross sensory / motor / coordination deficits  Musculoskeletal: No joint pain, swelling or deformity; no limitation of movement      I&O's Detail    13 Aug 2019 07:01  -  14 Aug 2019 05:44  --------------------------------------------------------  IN:    Oral Fluid: 240 mL    sodium chloride 0.9%.: 1650 mL  Total IN: 1890 mL    OUT:    Voided: 500 mL  Total OUT: 500 mL    Total NET: 1390 mL          LABS:                        12.4   8.01  )-----------( 233      ( 13 Aug 2019 15:36 )             34.0     08-13    142  |  106  |  13.0  ----------------------------<  88  4.1   |  27.0  |  1.13    Ca    9.4      13 Aug 2019 15:36  Phos  4.3     08-13  Mg     2.0     08-13    TPro  6.5<L>  /  Alb  3.7  /  TBili  0.2<L>  /  DBili  x   /  AST  19  /  ALT  46<H>  /  AlkPhos  39<L>  08-13    PT/INR - ( 13 Aug 2019 15:36 )   PT: 10.2 sec;   INR: 0.89 ratio         PTT - ( 13 Aug 2019 15:36 )  PTT:32.9 sec

## 2019-08-14 NOTE — PROGRESS NOTE ADULT - ASSESSMENT
Patient is 28yo M w/ pheochromocytoma and HTN presents for surgical optimization for planned adrenalectomy on 8/15. He feels well this morning.     - Cardiac CTA  - f/u cardiac clearance  - pre-op today for adrenalectomy 8/15

## 2019-08-14 NOTE — PROGRESS NOTE ADULT - ASSESSMENT
This is 28yo Man with PMHx of hypertension, recently diagnosed with Pheo,  MRI revealed questionable slight nodular thickening of the left adrenal gland measuring up to 1.2 cm. Presents to the ED today for prep of elective robotic assisted laparoscopic adrenalectomy on 8/15. Medicine consulted for clearance, seen by cardiology, recommend cardiac CTA - pending    A/P    >Pheochromocytoma   schedule for surgery on Thursday   BP is stable - c/w current medication   Per patient he can walk several blocks and can climb multiple flight of stairs without any problem  INR 0.89  TTE showed LVEf of 70-75%, moderate LVH  cardiology consult noted - New T wave inversions noted on EKG,  Obtain Cardiac CTA  clearance pending CTA  pt is currently on IVF NS @ 150cc/hr, not sure why he is on aggressive IVF since yesterday - defer to surgery    >HTN - c/w current medication  per cardio -  Consider transitioning to phenoxybenzamine     >Obesity - low fat diet     >DVT PPX - per surgery This is 30yo Man with PMHx of hypertension, recently diagnosed with Pheo,  MRI revealed questionable slight nodular thickening of the left adrenal gland measuring up to 1.2 cm. Presents to the ED today for prep of elective robotic assisted laparoscopic adrenalectomy on 8/15. Medicine consulted for clearance, seen by cardiology, recommend cardiac CTA - pending    A/P    >Pheochromocytoma   schedule for surgery on Thursday   BP is stable - c/w current medication   Per patient he can walk several blocks and can climb multiple flight of stairs without any problem  INR 0.89  TTE showed LVEf of 70-75%, moderate LVH  cardiology consult noted - New T wave inversions noted on EKG,  Obtain Cardiac CTA  clearance pending CTA  pt is currently on IVF NS @ 150cc/hr, not sure why he is on aggressive IVF since yesterday - defer to surgery    >HTN - c/w current medication  per cardio -  Consider transitioning to phenoxybenzamine     >Obesity - low fat diet     >DVT PPX - per surgery    Medically optimized for OR tomorrow  if cardiac CT is negative

## 2019-08-14 NOTE — PROGRESS NOTE ADULT - ASSESSMENT
A/P: 28 y/o M with a PMHx of HTN with elevated urine metanephrine levels and slight nodular thickening of the left adrenal gland concerning for pheochromocytoma, single episode seizure (03/19) due to severely elevated BP with normal MRI brain and EEG who presents to the ED today for planned elective robotic assisted laparoscopic adrenalectomy on 08/15/19. Patient states that his blood pressure has been better controlled at home, and he is feeling very well. Patient with METS>4 without any chest pain or shortness of breath. Patient states that he has been taking all of his medications as prescribed. Patient denies any fevers, chills, CP, SOB, palpitations, LE edema, abdominal pain, N/V/D, headache, or dizziness.   Bloodwork pending    1. Shantell-operative Cardiac Risk Stratification   - Patients echo and stress test are normal.  - Patient has good exercise tolerance/Normal myocardial perfusion.     Patient is low risk for perioperative cardiac events.    NO ABSOLUTE CONTRAINDICATIONS TO PROCEED WITH SURGERY FOR PHEOCHROMOCYTOMA FROM A CARDIOLOGY PERSPECTIVE.

## 2019-08-14 NOTE — PROGRESS NOTE ADULT - SUBJECTIVE AND OBJECTIVE BOX
[place] [facility]                                                        Office: [address] [address]                                                       Telephone: [phone]. Fax:[phone]                                                                             PROGRESS NOTE    Subjective:  Patient is feeling fine, no complaints     Review of symptoms:   Cardiac:  No chest pain. No dyspnea. No palpitations.  Respiratory:no cough. No dyspnea  Gastrointestinal: No diarrhea. No abdominal pain. No bleeding.   Neuro: No focal neuro complaints.      	  Vitals:  T(C): 36.7 (08-14-19 @ 08:28), Max: 37.1 (08-14-19 @ 00:22)  HR: 67 (08-14-19 @ 08:28) (67 - 81)  BP: 156/77 (08-14-19 @ 08:28) (131/81 - 162/62)  RR: 18 (08-14-19 @ 08:28) (18 - 18)  SpO2: 97% (08-14-19 @ 08:28) (96% - 98%)  Wt(kg): --  I&O's Summary    13 Aug 2019 07:01  -  14 Aug 2019 07:00  --------------------------------------------------------  IN: 2190 mL / OUT: 1100 mL / NET: 1090 mL      Weight (kg): 147.1 (08-14 @ 00:22)    PHYSICAL EXAM: Obese  Appearance: Comfortable. No acute distress  HEENT:  Head and neck: Atraumatic. Normocephalic. , Neck is supple. No JVD,   Neurologic: Alert and awake, Grossly nonfocal.   Lymphatic: No cervical lymphadenopathy  Cardiovascular: Normal S1 S2, No murmurs. No JVD,   Respiratory: Lungs clear to auscultation  Gastrointestinal:  Soft, Non-tender, + BS  Lower Extremities: No edema  Psychiatry: Patient is calm. No agitation.  Skin: No rashes.    CURRENT MEDICATIONS:    MEDICATIONS  (STANDING):  amLODIPine   Tablet 10 milliGRAM(s) Oral daily  ATENolol  Tablet 50 milliGRAM(s) Oral every 12 hours  chlorhexidine 2% Cloths 1 Application(s) Topical <User Schedule>  doxazosin Oral Tab/Cap - Peds 8 milliGRAM(s) Oral every 12 hours  enoxaparin Injectable 40 milliGRAM(s) SubCutaneous every 24 hours  hydrALAZINE 50 milliGRAM(s) Oral every 8 hours  lisinopril Oral Tab/Cap - Peds 7.5 milliGRAM(s) Oral daily  sodium chloride 0.9%. 1000 milliLiter(s) (100 mL/Hr) IV Continuous <Continuous>      LABS:	 	                          11.9   7.16  )-----------( 222      ( 14 Aug 2019 07:40 )             32.6     08-14    141  |  102  |  9.0  ----------------------------<  96  4.0   |  29.0  |  1.06    Ca    8.6      14 Aug 2019 07:40  Phos  4.1     08-14  Mg     1.8     08-14    TPro  6.5<L>  /  Alb  3.7  /  TBili  0.2<L>  /  DBili  x   /  AST  19  /  ALT  46<H>  /  AlkPhos  39<L>  08-13    proBNP:   Lipid Profile:       LIVER FUNCTIONS - ( 13 Aug 2019 15:36 )  Alb: 3.7 g/dL / Pro: 6.5 g/dL / ALK PHOS: 39 U/L / ALT: 46 U/L / AST: 19 U/L / GGT: x             < from: TTE Echo Complete w/Doppler (07.31.19 @ 17:54) >  Summary:   1. There is moderate concentric left ventricular hypertrophy.   2. Hyperdynamic global left ventricular systolic function.   3. Left ventricular ejection fraction, by visual estimation, is 70 to   75%.   4. Spectral Doppler shows impaired relaxation pattern of left   ventricular myocardial filling (Grade I diastolic dysfunction).   5. Normal right ventricular size and function.   6. The left atrium is normal in size.   7. The right atrium is normal in size.   8. Structurally normal mitral valve, with normal leaflet excursion.   9. Normal trileaflet aortic valve with normal opening.  10. There is no evidence of pericardial effusion.    MD Roxana Electronically signed on 8/1/2019 at 8:56:03 AM       < end of copied text >    < from: Nuclear Stress Test-Pharmacologic (08.14.19 @ 13:28) >    IMPRESSIONS:Normal Study  * The left ventricle was normal in size.  * Tracer uptakewas homogeneous throughout the left  ventricle.  * Normal study; no evidence for myocardial infarction or  ischemia.  * Gated wall motion analysis was performed, and shows  normal wall motion.  ------------------------------------------------------------------------      ------------------------------------------------------------------------    Confirmed on  8/14/2019 - 15:38:42 by Sylvia White    < end of copied text >

## 2019-08-14 NOTE — PROGRESS NOTE ADULT - SUBJECTIVE AND OBJECTIVE BOX
Internal Medicine Hospitalist - Dr. Clare VALLECILLO    [id number]    29y      Male    Patient is a 29y old  Male who presents with a chief complaint of Pheo/Chronic HTN (14 Aug 2019 05:44)    INTERVAL HPI/ OVERNIGHT EVENTS: Patient is seen and examined, denied chest pain, SOB, abd. pain, nausea and vomiting    REVIEW OF SYSTEMS:    Denied fever, chills, abd. pain, nausea, vomiting, chest pain, SOB, headache, dizziness    PHYSICAL EXAM:    Vital Signs Last 24 Hrs  T(C): 36.7 (14 Aug 2019 08:28), Max: 37.1 (14 Aug 2019 00:22)  T(F): 98.1 (14 Aug 2019 08:28), Max: 98.7 (14 Aug 2019 00:22)  HR: 67 (14 Aug 2019 08:28) (67 - 81)  BP: 156/77 (14 Aug 2019 08:28) (131/81 - 162/62)  BP(mean): --  RR: 18 (14 Aug 2019 08:28) (18 - 18)  SpO2: 97% (14 Aug 2019 08:28) (96% - 98%)    GENERAL: Obese  CHEST/LUNG: CTA b/l   HEART: S1S2+ audible  ABDOMEN: Soft, Nontender, Nondistended; Bowel sounds present  EXTREMITIES:  no edema  CNS: AAO X 3  Psychiatry: normal mood    LABS:                        11.9   7.16  )-----------( 222      ( 14 Aug 2019 07:40 )             32.6     08-14    141  |  102  |  9.0  ----------------------------<  96  4.0   |  29.0  |  1.06    Ca    8.6      14 Aug 2019 07:40  Phos  4.1     08-14  Mg     1.8     08-14    TPro  6.5<L>  /  Alb  3.7  /  TBili  0.2<L>  /  DBili  x   /  AST  19  /  ALT  46<H>  /  AlkPhos  39<L>  08-13    PT/INR - ( 13 Aug 2019 15:36 )   PT: 10.2 sec;   INR: 0.89 ratio         PTT - ( 13 Aug 2019 15:36 )  PTT:32.9 sec        MEDICATIONS  (STANDING):  amLODIPine   Tablet 10 milliGRAM(s) Oral daily  ATENolol  Tablet 50 milliGRAM(s) Oral every 12 hours  chlorhexidine 2% Cloths 1 Application(s) Topical <User Schedule>  doxazosin Oral Tab/Cap - Peds 8 milliGRAM(s) Oral every 12 hours  enoxaparin Injectable 40 milliGRAM(s) SubCutaneous every 24 hours  hydrALAZINE 50 milliGRAM(s) Oral every 8 hours  lisinopril Oral Tab/Cap - Peds 7.5 milliGRAM(s) Oral daily  magnesium sulfate  IVPB 2 Gram(s) IV Intermittent once  sodium chloride 0.9%. 1000 milliLiter(s) (150 mL/Hr) IV Continuous <Continuous>    MEDICATIONS  (PRN):      RADIOLOGY & ADDITIONAL TEST

## 2019-08-15 ENCOUNTER — APPOINTMENT (OUTPATIENT)
Dept: SURGICAL ONCOLOGY | Facility: HOSPITAL | Age: 29
End: 2019-08-15

## 2019-08-15 ENCOUNTER — RESULT REVIEW (OUTPATIENT)
Age: 29
End: 2019-08-15

## 2019-08-15 LAB
ANION GAP SERPL CALC-SCNC: 10 MMOL/L — SIGNIFICANT CHANGE UP (ref 5–17)
APTT BLD: 34.1 SEC — SIGNIFICANT CHANGE UP (ref 27.5–36.3)
BUN SERPL-MCNC: 12 MG/DL — SIGNIFICANT CHANGE UP (ref 8–20)
CALCIUM SERPL-MCNC: 9.1 MG/DL — SIGNIFICANT CHANGE UP (ref 8.6–10.2)
CHLORIDE SERPL-SCNC: 101 MMOL/L — SIGNIFICANT CHANGE UP (ref 98–107)
CO2 SERPL-SCNC: 29 MMOL/L — SIGNIFICANT CHANGE UP (ref 22–29)
CREAT SERPL-MCNC: 1.08 MG/DL — SIGNIFICANT CHANGE UP (ref 0.5–1.3)
GLUCOSE SERPL-MCNC: 94 MG/DL — SIGNIFICANT CHANGE UP (ref 70–115)
HCT VFR BLD CALC: 35.1 % — LOW (ref 39–50)
HGB BLD-MCNC: 12.7 G/DL — LOW (ref 13–17)
INR BLD: 0.86 RATIO — LOW (ref 0.88–1.16)
MAGNESIUM SERPL-MCNC: 2.3 MG/DL — SIGNIFICANT CHANGE UP (ref 1.6–2.6)
MCHC RBC-ENTMCNC: 31.1 PG — SIGNIFICANT CHANGE UP (ref 27–34)
MCHC RBC-ENTMCNC: 36.2 GM/DL — HIGH (ref 32–36)
MCV RBC AUTO: 86 FL — SIGNIFICANT CHANGE UP (ref 80–100)
PHOSPHATE SERPL-MCNC: 4.8 MG/DL — HIGH (ref 2.4–4.7)
PLATELET # BLD AUTO: 246 K/UL — SIGNIFICANT CHANGE UP (ref 150–400)
POTASSIUM SERPL-MCNC: 4 MMOL/L — SIGNIFICANT CHANGE UP (ref 3.5–5.3)
POTASSIUM SERPL-SCNC: 4 MMOL/L — SIGNIFICANT CHANGE UP (ref 3.5–5.3)
PROTHROM AB SERPL-ACNC: 9.9 SEC — LOW (ref 10–12.9)
RBC # BLD: 4.08 M/UL — LOW (ref 4.2–5.8)
RBC # FLD: 11.8 % — SIGNIFICANT CHANGE UP (ref 10.3–14.5)
SODIUM SERPL-SCNC: 140 MMOL/L — SIGNIFICANT CHANGE UP (ref 135–145)
WBC # BLD: 7.52 K/UL — SIGNIFICANT CHANGE UP (ref 3.8–10.5)
WBC # FLD AUTO: 7.52 K/UL — SIGNIFICANT CHANGE UP (ref 3.8–10.5)

## 2019-08-15 PROCEDURE — 88331 PATH CONSLTJ SURG 1 BLK 1SPC: CPT | Mod: 26

## 2019-08-15 PROCEDURE — 88307 TISSUE EXAM BY PATHOLOGIST: CPT | Mod: 26

## 2019-08-15 PROCEDURE — 71045 X-RAY EXAM CHEST 1 VIEW: CPT | Mod: 26

## 2019-08-15 PROCEDURE — 88304 TISSUE EXAM BY PATHOLOGIST: CPT | Mod: 26

## 2019-08-15 PROCEDURE — 60650 LAPAROSCOPY ADRENALECTOMY: CPT | Mod: AS

## 2019-08-15 RX ORDER — ONDANSETRON 8 MG/1
4 TABLET, FILM COATED ORAL ONCE
Refills: 0 | Status: DISCONTINUED | OUTPATIENT
Start: 2019-08-15 | End: 2019-08-15

## 2019-08-15 RX ORDER — AMLODIPINE BESYLATE 2.5 MG/1
10 TABLET ORAL DAILY
Refills: 0 | Status: DISCONTINUED | OUTPATIENT
Start: 2019-08-15 | End: 2019-08-15

## 2019-08-15 RX ORDER — HYDROMORPHONE HYDROCHLORIDE 2 MG/ML
1 INJECTION INTRAMUSCULAR; INTRAVENOUS; SUBCUTANEOUS
Refills: 0 | Status: DISCONTINUED | OUTPATIENT
Start: 2019-08-15 | End: 2019-08-15

## 2019-08-15 RX ORDER — FENTANYL CITRATE 50 UG/ML
50 INJECTION INTRAVENOUS ONCE
Refills: 0 | Status: DISCONTINUED | OUTPATIENT
Start: 2019-08-15 | End: 2019-08-15

## 2019-08-15 RX ORDER — ENOXAPARIN SODIUM 100 MG/ML
40 INJECTION SUBCUTANEOUS EVERY 24 HOURS
Refills: 0 | Status: DISCONTINUED | OUTPATIENT
Start: 2019-08-15 | End: 2019-08-16

## 2019-08-15 RX ORDER — LISINOPRIL 2.5 MG/1
7.5 TABLET ORAL DAILY
Refills: 0 | Status: DISCONTINUED | OUTPATIENT
Start: 2019-08-15 | End: 2019-08-15

## 2019-08-15 RX ORDER — HYDROMORPHONE HYDROCHLORIDE 2 MG/ML
0.5 INJECTION INTRAMUSCULAR; INTRAVENOUS; SUBCUTANEOUS
Refills: 0 | Status: DISCONTINUED | OUTPATIENT
Start: 2019-08-15 | End: 2019-08-17

## 2019-08-15 RX ORDER — SODIUM CHLORIDE 9 MG/ML
1000 INJECTION, SOLUTION INTRAVENOUS
Refills: 0 | Status: DISCONTINUED | OUTPATIENT
Start: 2019-08-15 | End: 2019-08-15

## 2019-08-15 RX ORDER — ACETAMINOPHEN 500 MG
650 TABLET ORAL EVERY 6 HOURS
Refills: 0 | Status: DISCONTINUED | OUTPATIENT
Start: 2019-08-15 | End: 2019-08-15

## 2019-08-15 RX ORDER — HYDRALAZINE HCL 50 MG
50 TABLET ORAL EVERY 8 HOURS
Refills: 0 | Status: DISCONTINUED | OUTPATIENT
Start: 2019-08-15 | End: 2019-08-15

## 2019-08-15 RX ORDER — ATENOLOL 25 MG/1
50 TABLET ORAL EVERY 12 HOURS
Refills: 0 | Status: DISCONTINUED | OUTPATIENT
Start: 2019-08-15 | End: 2019-08-18

## 2019-08-15 RX ORDER — DOXAZOSIN MESYLATE 4 MG
8 TABLET ORAL EVERY 12 HOURS
Refills: 0 | Status: DISCONTINUED | OUTPATIENT
Start: 2019-08-15 | End: 2019-08-15

## 2019-08-15 RX ORDER — DOXAZOSIN MESYLATE 4 MG
8 TABLET ORAL EVERY 12 HOURS
Refills: 0 | Status: DISCONTINUED | OUTPATIENT
Start: 2019-08-15 | End: 2019-08-16

## 2019-08-15 RX ORDER — HYDRALAZINE HCL 50 MG
50 TABLET ORAL EVERY 8 HOURS
Refills: 0 | Status: DISCONTINUED | OUTPATIENT
Start: 2019-08-15 | End: 2019-08-16

## 2019-08-15 RX ORDER — AMLODIPINE BESYLATE 2.5 MG/1
10 TABLET ORAL DAILY
Refills: 0 | Status: DISCONTINUED | OUTPATIENT
Start: 2019-08-15 | End: 2019-08-18

## 2019-08-15 RX ORDER — ACETAMINOPHEN 500 MG
650 TABLET ORAL EVERY 6 HOURS
Refills: 0 | Status: DISCONTINUED | OUTPATIENT
Start: 2019-08-15 | End: 2019-08-18

## 2019-08-15 RX ORDER — LISINOPRIL 2.5 MG/1
7.5 TABLET ORAL DAILY
Refills: 0 | Status: DISCONTINUED | OUTPATIENT
Start: 2019-08-15 | End: 2019-08-16

## 2019-08-15 RX ORDER — FENTANYL CITRATE 50 UG/ML
50 INJECTION INTRAVENOUS
Refills: 0 | Status: DISCONTINUED | OUTPATIENT
Start: 2019-08-15 | End: 2019-08-15

## 2019-08-15 RX ORDER — HYDROMORPHONE HYDROCHLORIDE 2 MG/ML
1 INJECTION INTRAMUSCULAR; INTRAVENOUS; SUBCUTANEOUS
Refills: 0 | Status: DISCONTINUED | OUTPATIENT
Start: 2019-08-15 | End: 2019-08-17

## 2019-08-15 RX ORDER — ATENOLOL 25 MG/1
50 TABLET ORAL EVERY 12 HOURS
Refills: 0 | Status: DISCONTINUED | OUTPATIENT
Start: 2019-08-15 | End: 2019-08-15

## 2019-08-15 RX ADMIN — Medication 650 MILLIGRAM(S): at 20:32

## 2019-08-15 RX ADMIN — Medication 650 MILLIGRAM(S): at 20:28

## 2019-08-15 RX ADMIN — LISINOPRIL 7.5 MILLIGRAM(S): 2.5 TABLET ORAL at 06:19

## 2019-08-15 RX ADMIN — SODIUM CHLORIDE 100 MILLILITER(S): 9 INJECTION INTRAMUSCULAR; INTRAVENOUS; SUBCUTANEOUS at 05:57

## 2019-08-15 RX ADMIN — HYDROMORPHONE HYDROCHLORIDE 1 MILLIGRAM(S): 2 INJECTION INTRAMUSCULAR; INTRAVENOUS; SUBCUTANEOUS at 22:36

## 2019-08-15 RX ADMIN — Medication 8 MILLIGRAM(S): at 06:20

## 2019-08-15 RX ADMIN — Medication 4 MILLIGRAM(S): at 20:27

## 2019-08-15 RX ADMIN — FENTANYL CITRATE 50 MICROGRAM(S): 50 INJECTION INTRAVENOUS at 18:09

## 2019-08-15 RX ADMIN — FENTANYL CITRATE 50 MICROGRAM(S): 50 INJECTION INTRAVENOUS at 19:21

## 2019-08-15 RX ADMIN — CHLORHEXIDINE GLUCONATE 1 APPLICATION(S): 213 SOLUTION TOPICAL at 05:58

## 2019-08-15 RX ADMIN — AMLODIPINE BESYLATE 10 MILLIGRAM(S): 2.5 TABLET ORAL at 20:29

## 2019-08-15 RX ADMIN — FENTANYL CITRATE 50 MICROGRAM(S): 50 INJECTION INTRAVENOUS at 17:57

## 2019-08-15 RX ADMIN — Medication 50 MILLIGRAM(S): at 06:20

## 2019-08-15 RX ADMIN — ATENOLOL 50 MILLIGRAM(S): 25 TABLET ORAL at 20:28

## 2019-08-15 RX ADMIN — HYDROMORPHONE HYDROCHLORIDE 1 MILLIGRAM(S): 2 INJECTION INTRAMUSCULAR; INTRAVENOUS; SUBCUTANEOUS at 22:24

## 2019-08-15 RX ADMIN — AMLODIPINE BESYLATE 10 MILLIGRAM(S): 2.5 TABLET ORAL at 06:20

## 2019-08-15 NOTE — PROGRESS NOTE ADULT - ASSESSMENT
Patient is 30yo M w/ pheochromocytoma and HTN presents for surgical optimization for planned adrenalectomy on 8/15. He feels well this morning.     - today for adrenalectomy 8/15

## 2019-08-15 NOTE — PROGRESS NOTE ADULT - SUBJECTIVE AND OBJECTIVE BOX
HPI/OVERNIGHT EVENTS: Patient examined at bedside. No complaints. He had a meal at the end of the day after Cardiology work.   Patient advised to go to hospital. She denies f/c/n/m    Vital Signs Last 24 Hrs  T(C): 36.8 (14 Aug 2019 22:05), Max: 36.8 (14 Aug 2019 18:52)  T(F): 98.2 (14 Aug 2019 22:05), Max: 98.2 (14 Aug 2019 18:52)  HR: 75 (14 Aug 2019 22:05) (67 - 75)  BP: 144/79 (14 Aug 2019 22:05) (131/81 - 156/77)  BP(mean): --  RR: 18 (14 Aug 2019 22:05) (18 - 18)  SpO2: 98% (14 Aug 2019 22:05) (97% - 98%)    I&O's Detail    13 Aug 2019 07:01  -  14 Aug 2019 07:00  --------------------------------------------------------  IN:    Oral Fluid: 240 mL    sodium chloride 0.9%.: 1950 mL  Total IN: 2190 mL    OUT:    Voided: 1100 mL  Total OUT: 1100 mL    Total NET: 1090 mL      14 Aug 2019 07:01  -  15 Aug 2019 01:30  --------------------------------------------------------  IN:    sodium chloride 0.9%.: 500 mL  Total IN: 500 mL    OUT:    Voided: 500 mL  Total OUT: 500 mL    Total NET: 0 mL              Constitutional: patient resting comfortably in bed, in no acute distress  HEENT: EOMI / PERRL b/l   Neck: No JVD, full ROM without pain  Respiratory: CTAB respirations are unlabored, no accessory muscle use, no conversational dyspnea  Cardiovascular: regular rate & rhythm   Gastrointestinal: Abdomen soft, non-tender, non-distended, no rebound tenderness / guarding  Incision/Wound: Clean, dry and intact  Rectal:   Neurological: GCS: 15 (4/5/6). A&O x 3; no gross sensory / motor / coordination deficits  Back: CVA tenderness   Psychiatric: Normal mood, normal affect  Musculoskeletal: No joint pain, swelling or deformity; no limitation of movement    LABS:                        11.9   7.16  )-----------( 222      ( 14 Aug 2019 07:40 )             32.6     08-14    141  |  102  |  9.0  ----------------------------<  96  4.0   |  29.0  |  1.06    Ca    8.6      14 Aug 2019 07:40  Phos  4.1     08-14  Mg     1.8     08-14    TPro  6.5<L>  /  Alb  3.7  /  TBili  0.2<L>  /  DBili  x   /  AST  19  /  ALT  46<H>  /  AlkPhos  39<L>  08-13    PT/INR - ( 13 Aug 2019 15:36 )   PT: 10.2 sec;   INR: 0.89 ratio         PTT - ( 13 Aug 2019 15:36 )  PTT:32.9 sec      MEDICATIONS  (STANDING):  amLODIPine   Tablet 10 milliGRAM(s) Oral daily  ATENolol  Tablet 50 milliGRAM(s) Oral every 12 hours  chlorhexidine 2% Cloths 1 Application(s) Topical <User Schedule>  doxazosin Oral Tab/Cap - Peds 8 milliGRAM(s) Oral every 12 hours  enoxaparin Injectable 40 milliGRAM(s) SubCutaneous every 24 hours  hydrALAZINE 50 milliGRAM(s) Oral every 8 hours  lisinopril Oral Tab/Cap - Peds 7.5 milliGRAM(s) Oral daily  sodium chloride 0.9%. 1000 milliLiter(s) (100 mL/Hr) IV Continuous <Continuous>    MEDICATIONS  (PRN): HPI/OVERNIGHT EVENTS: Patient examined at bedside. No complaints. He had a meal at the end of the day after Cardiology workup. He was cleared by Medicine and Cardiology for OR procedure today. He denies f/c/n/m    Vital Signs Last 24 Hrs  T(C): 36.8 (14 Aug 2019 22:05), Max: 36.8 (14 Aug 2019 18:52)  T(F): 98.2 (14 Aug 2019 22:05), Max: 98.2 (14 Aug 2019 18:52)  HR: 75 (14 Aug 2019 22:05) (67 - 75)  BP: 144/79 (14 Aug 2019 22:05) (131/81 - 156/77)  BP(mean): --  RR: 18 (14 Aug 2019 22:05) (18 - 18)  SpO2: 98% (14 Aug 2019 22:05) (97% - 98%)    I&O's Detail    13 Aug 2019 07:01  -  14 Aug 2019 07:00  --------------------------------------------------------  IN:    Oral Fluid: 240 mL    sodium chloride 0.9%.: 1950 mL  Total IN: 2190 mL    OUT:    Voided: 1100 mL  Total OUT: 1100 mL    Total NET: 1090 mL      14 Aug 2019 07:01  -  15 Aug 2019 01:30  --------------------------------------------------------  IN:    sodium chloride 0.9%.: 500 mL  Total IN: 500 mL    OUT:    Voided: 500 mL  Total OUT: 500 mL    Total NET: 0 mL        Constitutional: patient resting comfortably in bed, in no acute distress  HEENT: EOMI / PERRL b/l   Neck: No JVD, full ROM without pain  Respiratory: CTAB respirations are unlabored, no accessory muscle use, no conversational dyspnea  Cardiovascular: regular rate & rhythm   Gastrointestinal: Abdomen soft, non-tender, non-distended, no rebound tenderness / guarding  Musculoskeletal: No joint pain, swelling or deformity; no limitation of movement    LABS:                        11.9   7.16  )-----------( 222      ( 14 Aug 2019 07:40 )             32.6     08-14    141  |  102  |  9.0  ----------------------------<  96  4.0   |  29.0  |  1.06    Ca    8.6      14 Aug 2019 07:40  Phos  4.1     08-14  Mg     1.8     08-14    TPro  6.5<L>  /  Alb  3.7  /  TBili  0.2<L>  /  DBili  x   /  AST  19  /  ALT  46<H>  /  AlkPhos  39<L>  08-13    PT/INR - ( 13 Aug 2019 15:36 )   PT: 10.2 sec;   INR: 0.89 ratio         PTT - ( 13 Aug 2019 15:36 )  PTT:32.9 sec      MEDICATIONS  (STANDING):  amLODIPine   Tablet 10 milliGRAM(s) Oral daily  ATENolol  Tablet 50 milliGRAM(s) Oral every 12 hours  chlorhexidine 2% Cloths 1 Application(s) Topical <User Schedule>  doxazosin Oral Tab/Cap - Peds 8 milliGRAM(s) Oral every 12 hours  enoxaparin Injectable 40 milliGRAM(s) SubCutaneous every 24 hours  hydrALAZINE 50 milliGRAM(s) Oral every 8 hours  lisinopril Oral Tab/Cap - Peds 7.5 milliGRAM(s) Oral daily  sodium chloride 0.9%. 1000 milliLiter(s) (100 mL/Hr) IV Continuous <Continuous>    MEDICATIONS  (PRN):

## 2019-08-16 DIAGNOSIS — D35.00 BENIGN NEOPLASM OF UNSPECIFIED ADRENAL GLAND: ICD-10-CM

## 2019-08-16 LAB
ANION GAP SERPL CALC-SCNC: 10 MMOL/L — SIGNIFICANT CHANGE UP (ref 5–17)
BASOPHILS # BLD AUTO: 0.05 K/UL — SIGNIFICANT CHANGE UP (ref 0–0.2)
BASOPHILS NFR BLD AUTO: 0.5 % — SIGNIFICANT CHANGE UP (ref 0–2)
BUN SERPL-MCNC: 10 MG/DL — SIGNIFICANT CHANGE UP (ref 8–20)
CALCIUM SERPL-MCNC: 9 MG/DL — SIGNIFICANT CHANGE UP (ref 8.6–10.2)
CHLORIDE SERPL-SCNC: 104 MMOL/L — SIGNIFICANT CHANGE UP (ref 98–107)
CO2 SERPL-SCNC: 27 MMOL/L — SIGNIFICANT CHANGE UP (ref 22–29)
CREAT SERPL-MCNC: 1.02 MG/DL — SIGNIFICANT CHANGE UP (ref 0.5–1.3)
EOSINOPHIL # BLD AUTO: 0.1 K/UL — SIGNIFICANT CHANGE UP (ref 0–0.5)
EOSINOPHIL NFR BLD AUTO: 1 % — SIGNIFICANT CHANGE UP (ref 0–6)
GLUCOSE SERPL-MCNC: 126 MG/DL — HIGH (ref 70–115)
HCT VFR BLD CALC: 33.6 % — LOW (ref 39–50)
HGB BLD-MCNC: 12.5 G/DL — LOW (ref 13–17)
IMM GRANULOCYTES NFR BLD AUTO: 0.5 % — SIGNIFICANT CHANGE UP (ref 0–1.5)
LYMPHOCYTES # BLD AUTO: 19.8 % — SIGNIFICANT CHANGE UP (ref 13–44)
LYMPHOCYTES # BLD AUTO: 2.02 K/UL — SIGNIFICANT CHANGE UP (ref 1–3.3)
MAGNESIUM SERPL-MCNC: 2.2 MG/DL — SIGNIFICANT CHANGE UP (ref 1.6–2.6)
MCHC RBC-ENTMCNC: 31.8 PG — SIGNIFICANT CHANGE UP (ref 27–34)
MCHC RBC-ENTMCNC: 37.2 GM/DL — HIGH (ref 32–36)
MCV RBC AUTO: 85.5 FL — SIGNIFICANT CHANGE UP (ref 80–100)
MONOCYTES # BLD AUTO: 0.78 K/UL — SIGNIFICANT CHANGE UP (ref 0–0.9)
MONOCYTES NFR BLD AUTO: 7.6 % — SIGNIFICANT CHANGE UP (ref 2–14)
NEUTROPHILS # BLD AUTO: 7.21 K/UL — SIGNIFICANT CHANGE UP (ref 1.8–7.4)
NEUTROPHILS NFR BLD AUTO: 70.6 % — SIGNIFICANT CHANGE UP (ref 43–77)
PHOSPHATE SERPL-MCNC: 4.5 MG/DL — SIGNIFICANT CHANGE UP (ref 2.4–4.7)
PLATELET # BLD AUTO: 226 K/UL — SIGNIFICANT CHANGE UP (ref 150–400)
POTASSIUM SERPL-MCNC: 3.9 MMOL/L — SIGNIFICANT CHANGE UP (ref 3.5–5.3)
POTASSIUM SERPL-SCNC: 3.9 MMOL/L — SIGNIFICANT CHANGE UP (ref 3.5–5.3)
RBC # BLD: 3.93 M/UL — LOW (ref 4.2–5.8)
RBC # FLD: 11.8 % — SIGNIFICANT CHANGE UP (ref 10.3–14.5)
SODIUM SERPL-SCNC: 141 MMOL/L — SIGNIFICANT CHANGE UP (ref 135–145)
WBC # BLD: 10.21 K/UL — SIGNIFICANT CHANGE UP (ref 3.8–10.5)
WBC # FLD AUTO: 10.21 K/UL — SIGNIFICANT CHANGE UP (ref 3.8–10.5)

## 2019-08-16 PROCEDURE — 99232 SBSQ HOSP IP/OBS MODERATE 35: CPT

## 2019-08-16 PROCEDURE — 93010 ELECTROCARDIOGRAM REPORT: CPT

## 2019-08-16 PROCEDURE — 99231 SBSQ HOSP IP/OBS SF/LOW 25: CPT

## 2019-08-16 RX ORDER — ENOXAPARIN SODIUM 100 MG/ML
30 INJECTION SUBCUTANEOUS EVERY 12 HOURS
Refills: 0 | Status: DISCONTINUED | OUTPATIENT
Start: 2019-08-16 | End: 2019-08-18

## 2019-08-16 RX ADMIN — Medication 650 MILLIGRAM(S): at 06:04

## 2019-08-16 RX ADMIN — AMLODIPINE BESYLATE 10 MILLIGRAM(S): 2.5 TABLET ORAL at 05:38

## 2019-08-16 RX ADMIN — HYDROMORPHONE HYDROCHLORIDE 1 MILLIGRAM(S): 2 INJECTION INTRAMUSCULAR; INTRAVENOUS; SUBCUTANEOUS at 20:04

## 2019-08-16 RX ADMIN — ENOXAPARIN SODIUM 30 MILLIGRAM(S): 100 INJECTION SUBCUTANEOUS at 17:08

## 2019-08-16 RX ADMIN — ENOXAPARIN SODIUM 30 MILLIGRAM(S): 100 INJECTION SUBCUTANEOUS at 05:39

## 2019-08-16 RX ADMIN — HYDROMORPHONE HYDROCHLORIDE 1 MILLIGRAM(S): 2 INJECTION INTRAMUSCULAR; INTRAVENOUS; SUBCUTANEOUS at 19:34

## 2019-08-16 RX ADMIN — Medication 650 MILLIGRAM(S): at 23:57

## 2019-08-16 RX ADMIN — Medication 8 MILLIGRAM(S): at 05:39

## 2019-08-16 RX ADMIN — ATENOLOL 50 MILLIGRAM(S): 25 TABLET ORAL at 05:40

## 2019-08-16 RX ADMIN — HYDROMORPHONE HYDROCHLORIDE 1 MILLIGRAM(S): 2 INJECTION INTRAMUSCULAR; INTRAVENOUS; SUBCUTANEOUS at 09:09

## 2019-08-16 RX ADMIN — HYDROMORPHONE HYDROCHLORIDE 1 MILLIGRAM(S): 2 INJECTION INTRAMUSCULAR; INTRAVENOUS; SUBCUTANEOUS at 05:26

## 2019-08-16 RX ADMIN — Medication 650 MILLIGRAM(S): at 05:39

## 2019-08-16 RX ADMIN — Medication 650 MILLIGRAM(S): at 12:23

## 2019-08-16 RX ADMIN — HYDROMORPHONE HYDROCHLORIDE 1 MILLIGRAM(S): 2 INJECTION INTRAMUSCULAR; INTRAVENOUS; SUBCUTANEOUS at 09:40

## 2019-08-16 RX ADMIN — HYDROMORPHONE HYDROCHLORIDE 1 MILLIGRAM(S): 2 INJECTION INTRAMUSCULAR; INTRAVENOUS; SUBCUTANEOUS at 13:21

## 2019-08-16 RX ADMIN — HYDROMORPHONE HYDROCHLORIDE 1 MILLIGRAM(S): 2 INJECTION INTRAMUSCULAR; INTRAVENOUS; SUBCUTANEOUS at 13:49

## 2019-08-16 RX ADMIN — HYDROMORPHONE HYDROCHLORIDE 1 MILLIGRAM(S): 2 INJECTION INTRAMUSCULAR; INTRAVENOUS; SUBCUTANEOUS at 04:40

## 2019-08-16 RX ADMIN — Medication 650 MILLIGRAM(S): at 17:08

## 2019-08-16 RX ADMIN — Medication 650 MILLIGRAM(S): at 13:18

## 2019-08-16 NOTE — CHART NOTE - NSCHARTNOTEFT_GEN_A_CORE
SICU DOWNGRADE NOTE    29y Male transferred to floor from SICU to [address] [place] [address] on POD1 s/p robotic left adrenalectomy as his blood pressure has been well controlled, requiring less medication than he did preoperatively.  Seen and examined at bedside during rounds with attending. He feels well on the floor. Pain is well controlled. He denies any nausea, vomiting,, chest pain, shortness of breath, or any new or concerning symptoms Ambulating independently, voiding, and tolerating a regular diet.     SUBJECTIVE:  -------------------------------------------------------------------------------------------  PMHx/PSHx: PAST MEDICAL & SURGICAL HISTORY:  HTN (hypertension)  No significant past surgical history    Allergies: No Known Allergies  No pancakes/no Nigerian toast/prefers omelets in am RDOK (Unknown)    -------------------------------------------------------------------------------------------    OBJECTIVE:    PHYSICAL EXAM:  GENERAL: NAD, well-developed, well nourished man sitting in chair comfortably  HEAD:  Atraumatic, Normocephalic  CHEST/LUNG: Clear to auscultation bilaterally; No wheeze  HEART: Regular rate and rhythm  ABDOMEN: Soft, Nontender, Nondistended; Incisions x5 with dressings clean, dry, and intact.   EXTREMITIES: Full ROM    --------------------------------------------  Vitals:  T(C): 36.5 (08-16-19 @ 15:46), Max: 37 (08-15-19 @ 21:49)  HR: 68 (08-16-19 @ 15:46) (61 - 97)  BP: 112/70 (08-16-19 @ 15:46) (105/52 - 173/86)  RR: 18 (08-16-19 @ 15:46) (11 - 25)  SpO2: 98% (08-16-19 @ 15:46) (95% - 100%)  Wt(kg): --    -------------------------------------------------------------------------------------------  I&O's Summary    15 Aug 2019 07:01  -  16 Aug 2019 07:00  --------------------------------------------------------  IN: 400 mL / OUT: 2550 mL / NET: -2150 mL    16 Aug 2019 07:01  -  16 Aug 2019 16:21  --------------------------------------------------------  IN: 1100 mL / OUT: 1040 mL / NET: 60 mL    -------------------------------------------------------------------------------------------  Labs:                        12.5   10.21 )-----------( 226      ( 16 Aug 2019 05:36 )             33.6       Auto Immature Granulocyte %: 0.5 % (08-16-19 @ 05:36)  Auto Neutrophil %: 70.6 % (08-16-19 @ 05:36)    08-16    141  |  104  |  10.0  ----------------------------<  126<H>  3.9   |  27.0  |  1.02      Calcium, Total Serum: 9.0 mg/dL (08-16-19 @ 05:36)      LFTs:         Coags:     9.9    ----< 0.86    ( 15 Aug 2019 05:48 )     34.1            -------------------------------------------------------------------------------------------  Active Medications:  MEDICATIONS  (STANDING):  acetaminophen   Tablet .. 650 milliGRAM(s) Oral every 6 hours  amLODIPine   Tablet 10 milliGRAM(s) Oral daily  ATENolol  Tablet 50 milliGRAM(s) Oral every 12 hours  enoxaparin Injectable 30 milliGRAM(s) SubCutaneous every 12 hours    MEDICATIONS  (PRN):  HYDROmorphone  Injectable 0.5 milliGRAM(s) IV Push every 3 hours PRN Moderate Pain (4 - 6)  HYDROmorphone  Injectable 1 milliGRAM(s) IV Push every 3 hours PRN Severe Pain (7 - 10)    -------------------------------------------------------------------------------------------     Assessment/Plan:  29y Male s/p robotic left adrenalectomy POD 1 transferred to floor from SICU. His blood pressure has been well controlled and offers no complaints. Pain is well controlled. Plan to reduce medication need for blood pressure management.    - discontinuing doxazosin  - monitor bp closely  - pain control  - regular diet  - f/u pathology   - encourage oob  - dvt ppx  - needs outpatient f/u with endocrine and cards

## 2019-08-16 NOTE — PROGRESS NOTE ADULT - SUBJECTIVE AND OBJECTIVE BOX
Internal Medicine Hospitalist - Dr. Clare VALLECILLO    [id number]    29y      Male    Patient is a 29y old  Male who presents with a chief complaint of Pheo/Chronic HTN (16 Aug 2019 04:53    INTERVAL HPI/ OVERNIGHT EVENTS: Patient is seen and examined, s/p OR yesterday, report mild abd. pain, denied nausea and vomiting    REVIEW OF SYSTEMS:    Denied fever, chills, abd. pain, nausea, vomiting, chest pain, SOB, headache, dizziness    PHYSICAL EXAM:    Vital Signs Last 24 Hrs  T(C): 36.9 (16 Aug 2019 08:00), Max: 37 (15 Aug 2019 21:49)  T(F): 98.4 (16 Aug 2019 08:00), Max: 98.6 (15 Aug 2019 21:49)  HR: 67 (16 Aug 2019 09:00) (61 - 97)  BP: 143/77 (16 Aug 2019 08:00) (108/54 - 173/86)  BP(mean): 107 (16 Aug 2019 08:00) (107 - 121)  RR: 14 (16 Aug 2019 09:00) (12 - 25)  SpO2: 98% (16 Aug 2019 09:00) (95% - 100%)    GENERAL: obese  CHEST/LUNG: CTA b/l   HEART: S1S2+ audible  ABDOMEN: Obese,  Surgical incision sites C/D/I.  Soft, non-tender, normal bowel sounds  EXTREMITIES:  no edema  CNS: AAO X 3  Psychiatry: normal mood    LABS:                        12.5   10.21 )-----------( 226      ( 16 Aug 2019 05:36 )             33.6     08-16    141  |  104  |  10.0  ----------------------------<  126<H>  3.9   |  27.0  |  1.02    Ca    9.0      16 Aug 2019 05:36  Phos  4.5     08-16  Mg     2.2     08-16      PT/INR - ( 15 Aug 2019 05:48 )   PT: 9.9 sec;   INR: 0.86 ratio         PTT - ( 15 Aug 2019 05:48 )  PTT:34.1 sec        MEDICATIONS  (STANDING):  acetaminophen   Tablet .. 650 milliGRAM(s) Oral every 6 hours  amLODIPine   Tablet 10 milliGRAM(s) Oral daily  ATENolol  Tablet 50 milliGRAM(s) Oral every 12 hours  doxazosin Oral Tab/Cap - Peds 8 milliGRAM(s) Oral every 12 hours  enoxaparin Injectable 30 milliGRAM(s) SubCutaneous every 12 hours    MEDICATIONS  (PRN):  HYDROmorphone  Injectable 0.5 milliGRAM(s) IV Push every 3 hours PRN Moderate Pain (4 - 6)  HYDROmorphone  Injectable 1 milliGRAM(s) IV Push every 3 hours PRN Severe Pain (7 - 10)      RADIOLOGY & ADDITIONAL TEST

## 2019-08-16 NOTE — PROGRESS NOTE ADULT - SUBJECTIVE AND OBJECTIVE BOX
OVERNIGHT EVENTS: Patient had no issues post-op. blood pressure and pain  well controlled. Tolerating a regular diet.    STATUS POST:  Robot-assisted left adrenalectomy     POST OPERATIVE DAY #: 1      amLODIPine   Tablet 10 milliGRAM(s) Oral daily  ATENolol  Tablet 50 milliGRAM(s) Oral every 12 hours  doxazosin Oral Tab/Cap - Peds 8 milliGRAM(s) Oral every 12 hours  enoxaparin Injectable 40 milliGRAM(s) SubCutaneous every 24 hours  hydrALAZINE 50 milliGRAM(s) Oral every 8 hours  lisinopril Oral Tab/Cap - Peds 7.5 milliGRAM(s) Oral daily      Vital Signs Last 24 Hrs  T(C): 37 (16 Aug 2019 00:09), Max: 37 (15 Aug 2019 21:49)  T(F): 98.6 (16 Aug 2019 00:09), Max: 98.6 (15 Aug 2019 21:49)  HR: 94 (15 Aug 2019 23:30) (61 - 97)  BP: 173/86 (15 Aug 2019 20:00) (108/54 - 176/91)  BP(mean): 121 (15 Aug 2019 20:00) (121 - 121)  RR: 23 (15 Aug 2019 23:30) (12 - 25)  SpO2: 95% (15 Aug 2019 23:30) (95% - 100%)  I&O's Detail    14 Aug 2019 07:01  -  15 Aug 2019 07:00  --------------------------------------------------------  IN:    sodium chloride 0.9%: 1200 mL  Total IN: 1200 mL    OUT:    Voided: 1300 mL  Total OUT: 1300 mL    Total NET: -100 mL      15 Aug 2019 07:01  -  16 Aug 2019 03:54  --------------------------------------------------------  IN:    sodium chloride 0.9%: 400 mL  Total IN: 400 mL    OUT:    Voided: 1650 mL  Total OUT: 1650 mL    Total NET: -1250 mL          General: NAD, resting comfortably in bed  C/V: NSR on monitor   Pulm: Nonlabored breathing, no respiratory distress  Abd: soft, obese, surgical dressing cites c/d/i, appropriate tenderness to palpation   : voiding spontaneously , clear yellow urine  Extrem: WWP, no edema, SCDs in place    LABS:                        12.7   7.52  )-----------( 246      ( 15 Aug 2019 05:48 )             35.1     08-15    140  |  101  |  12.0  ----------------------------<  94  4.0   |  29.0  |  1.08    Ca    9.1      15 Aug 2019 05:48  Phos  4.8     08-15  Mg     2.3     08-15      PT/INR - ( 15 Aug 2019 05:48 )   PT: 9.9 sec;   INR: 0.86 ratio         PTT - ( 15 Aug 2019 05:48 )  PTT:34.1 sec      RADIOLOGY & ADDITIONAL STUDIES: OVERNIGHT EVENTS: Patient had no issues post-op. blood pressure and pain  well controlled. Tolerating a regular diet, no bowel function as of yet.    STATUS POST:  Robot-assisted left adrenalectomy     POST OPERATIVE DAY #: 1        amLODIPine   Tablet 10 milliGRAM(s) Oral daily  ATENolol  Tablet 50 milliGRAM(s) Oral every 12 hours  doxazosin Oral Tab/Cap - Peds 8 milliGRAM(s) Oral every 12 hours  enoxaparin Injectable 40 milliGRAM(s) SubCutaneous every 24 hours  hydrALAZINE 50 milliGRAM(s) Oral every 8 hours  lisinopril Oral Tab/Cap - Peds 7.5 milliGRAM(s) Oral daily      Vital Signs Last 24 Hrs  T(C): 37 (16 Aug 2019 00:09), Max: 37 (15 Aug 2019 21:49)  T(F): 98.6 (16 Aug 2019 00:09), Max: 98.6 (15 Aug 2019 21:49)  HR: 94 (15 Aug 2019 23:30) (61 - 97)  BP: 173/86 (15 Aug 2019 20:00) (108/54 - 176/91)  BP(mean): 121 (15 Aug 2019 20:00) (121 - 121)  RR: 23 (15 Aug 2019 23:30) (12 - 25)  SpO2: 95% (15 Aug 2019 23:30) (95% - 100%)  I&O's Detail    14 Aug 2019 07:01  -  15 Aug 2019 07:00  --------------------------------------------------------  IN:    sodium chloride 0.9%: 1200 mL  Total IN: 1200 mL    OUT:    Voided: 1300 mL  Total OUT: 1300 mL    Total NET: -100 mL      15 Aug 2019 07:01  -  16 Aug 2019 03:54  --------------------------------------------------------  IN:    sodium chloride 0.9%: 400 mL  Total IN: 400 mL    OUT:    Voided: 1650 mL  Total OUT: 1650 mL    Total NET: -1250 mL          General: NAD, resting comfortably in bed  C/V: NSR on monitor   Pulm: Nonlabored breathing, no respiratory distress  Abd: soft, obese, surgical dressing cites c/d/i, appropriate tenderness to palpation, no bm, no flatus, no nausea, vomiting   : voiding spontaneously , clear yellow urine  Extrem: WWP, no edema, SCDs in place    LABS:                        12.7   7.52  )-----------( 246      ( 15 Aug 2019 05:48 )             35.1     08-15    140  |  101  |  12.0  ----------------------------<  94  4.0   |  29.0  |  1.08    Ca    9.1      15 Aug 2019 05:48  Phos  4.8     08-15  Mg     2.3     08-15      PT/INR - ( 15 Aug 2019 05:48 )   PT: 9.9 sec;   INR: 0.86 ratio         PTT - ( 15 Aug 2019 05:48 )  PTT:34.1 sec      RADIOLOGY & ADDITIONAL STUDIES:

## 2019-08-16 NOTE — PROGRESS NOTE ADULT - SUBJECTIVE AND OBJECTIVE BOX
INTERVAL HPI/OVERNIGHT EVENTS:    Pt admitted to SICU s/p Robot-assisted left adrenalectomy to monitor hemodynamics.  Patient had no issues post-op.  Pt's blood pressure and pain well controlled. Tolerating a regular diet, no bowel function as of yet.    MEDICATIONS  (STANDING):  acetaminophen   Tablet .. 650 milliGRAM(s) Oral every 6 hours  amLODIPine   Tablet 10 milliGRAM(s) Oral daily  ATENolol  Tablet 50 milliGRAM(s) Oral every 12 hours  doxazosin Oral Tab/Cap - Peds 8 milliGRAM(s) Oral every 12 hours  enoxaparin Injectable 40 milliGRAM(s) SubCutaneous every 24 hours  hydrALAZINE 50 milliGRAM(s) Oral every 8 hours  lisinopril Oral Tab/Cap - Peds 7.5 milliGRAM(s) Oral daily    MEDICATIONS  (PRN):  HYDROmorphone  Injectable 0.5 milliGRAM(s) IV Push every 3 hours PRN Moderate Pain (4 - 6)  HYDROmorphone  Injectable 1 milliGRAM(s) IV Push every 3 hours PRN Severe Pain (7 - 10)      Drug Dosing Weight  Height (cm): 182.9 (14 Aug 2019 00:22)  Weight (kg): 147.1 (14 Aug 2019 00:22)  BMI (kg/m2): 44 (14 Aug 2019 00:22)  BSA (m2): 2.62 (14 Aug 2019 00:22)        PAST MEDICAL & SURGICAL HISTORY:  HTN (hypertension)  No significant past surgical history      ICU Vital Signs Last 24 Hrs  T(C): 36.8 (16 Aug 2019 04:21), Max: 37 (15 Aug 2019 21:49)  T(F): 98.2 (16 Aug 2019 04:21), Max: 98.6 (15 Aug 2019 21:49)  HR: 94 (15 Aug 2019 23:30) (61 - 97)  BP: 173/86 (15 Aug 2019 20:00) (108/54 - 176/91)  BP(mean): 121 (15 Aug 2019 20:00) (121 - 121)  ABP: 131/51 (15 Aug 2019 23:30) (126/57 - 188/95)  ABP(mean): 75 (15 Aug 2019 23:30) (75 - 124)  RR: 23 (15 Aug 2019 23:30) (12 - 25)  SpO2: 95% (15 Aug 2019 23:30) (95% - 100%)          I&O's Detail    14 Aug 2019 07:01  -  15 Aug 2019 07:00  --------------------------------------------------------  IN:    sodium chloride 0.9%: 1200 mL  Total IN: 1200 mL    OUT:    Voided: 1300 mL  Total OUT: 1300 mL    Total NET: -100 mL      15 Aug 2019 07:01  -  16 Aug 2019 04:54  --------------------------------------------------------  IN:    sodium chloride 0.9%: 400 mL  Total IN: 400 mL    OUT:    Voided: 2550 mL  Total OUT: 2550 mL    Total NET: -2150 mL              Physical Exam:    Neurological:  No sensory/motor deficits    HEENT: PERRLA, EOMI, no drainage or redness    Neck: No bruits; no thyromegaly or nodules,  No JVD    Back: Normal spine flexure, No CVA tenderness, No deformity or limitation of movement    Respiratory: Breath Sounds equal & clear to auscultation, no accessory muscle use    Cardiovascular: Regular rate & rhythm, normal S1, S2; no murmurs, gallops or rubs    Gastrointestinal:  Obese.  Surgical incision sites C/D/I.  Soft, non-tender, normal bowel sounds    Extremities: No peripheral edema, No cyanosis, clubbing     Vascular: Equal and normal pulses: 2+ peripheral pulses throughout    Musculoskeletal: No joint pain, swelling or deformity; no limitation of movement        LABS:  CBC Full  -  ( 15 Aug 2019 05:48 )  WBC Count : 7.52 K/uL  RBC Count : 4.08 M/uL  Hemoglobin : 12.7 g/dL  Hematocrit : 35.1 %  Platelet Count - Automated : 246 K/uL  Mean Cell Volume : 86.0 fl  Mean Cell Hemoglobin : 31.1 pg  Mean Cell Hemoglobin Concentration : 36.2 gm/dL  Auto Neutrophil # : x  Auto Lymphocyte # : x  Auto Monocyte # : x  Auto Eosinophil # : x  Auto Basophil # : x  Auto Neutrophil % : x  Auto Lymphocyte % : x  Auto Monocyte % : x  Auto Eosinophil % : x  Auto Basophil % : x    08-15    140  |  101  |  12.0  ----------------------------<  94  4.0   |  29.0  |  1.08    Ca    9.1      15 Aug 2019 05:48  Phos  4.8     08-15  Mg     2.3     08-15      PT/INR - ( 15 Aug 2019 05:48 )   PT: 9.9 sec;   INR: 0.86 ratio         PTT - ( 15 Aug 2019 05:48 )  PTT:34.1 sec

## 2019-08-16 NOTE — PROGRESS NOTE ADULT - ASSESSMENT
29M with PMHx of hypertension, single episode seizure (3/2019) due to severely elevated BP with normal MRI brain and EEG presented to ED this past month with elevated BP. He had hx of long standing hx of global diaphoresis, sweaty palms and intermittent HA's. During this recent hospital admission, work-up including MRI revealed questionable slight nodular thickening of the left adrenal gland measuring up to 1.2 cm, also urine studies with elevated metanephrine levels concerning for pheochromocytoma done in 3/2019 (urine 24hr metanephrines total at 1649 normetamephrines 1256 and normal plasma metanephrines but elevated normetanphrines 160). BP was controlled with multiple medications this past admission and was sent home. Presents to the ED today for prep of elective robotic assisted laparoscopic adrenalectomy on 8/15.  spoke with the radiologist- no definite adrenal adenoma and questionable thickening on MRI abdomen. no retroperitoneal paraganglioma on CT angio c/a/p. unfortunately 143 MIBG scan cannot be done at [facility] and neither can a dotate PET scan    Possible pheo vs paraganglioma  -s/p lap left adrenalectomy 8/15  -radiological scans not convincing, spoke to radiology at length  -if patient still has persistent HTN, would need MIBG scan or PET scan (both outpatient) to r/o paraganglioma as a cause of persistent HTN  -patient still needs to follow up with us in the clinic, for which the patient agreed    HTN- well controlled. off lisinopril and off hydralazine. primary team plans to wean off other agents as well    patient to be discharged tomorrow

## 2019-08-16 NOTE — DIETITIAN INITIAL EVALUATION ADULT. - PERTINENT LABORATORY DATA
08-16 Na141 mmol/L Glu 126 mg/dL<H> K+ 3.9 mmol/L Cr  1.02 mg/dL BUN 10.0 mg/dL Phos 4.5 mg/dL Alb n/a   PAB n/a     n/a  HgbA1C

## 2019-08-16 NOTE — PROGRESS NOTE ADULT - PROBLEM SELECTOR PLAN 1
Care per sicu team  monitor bp closely, continue current regimen  serial abdominal exams  pain control  regular diet  monitor for return of bowel function   f/u pathology   encourage oob  dvt ppx  needs outpatient f/u with endocrine and cards

## 2019-08-16 NOTE — PROGRESS NOTE ADULT - SUBJECTIVE AND OBJECTIVE BOX
Interval Events:  no overnight events  follow up on possible pheo    patient seen and examined at bedside.  feels good otherwise  off a few bp meds    REVIEW OF SYSTEMS:    CONSTITUTIONAL: No fever, weight loss, or fatigue  EYES: No eye pain, visual disturbances, or discharge  ENMT:  No difficulty hearing, tinnitus, vertigo; No sinus or throat pain  NECK: No pain or stiffness  RESPIRATORY: No cough, wheezing, chills or hemoptysis; No shortness of breath  CARDIOVASCULAR: No chest pain, palpitations, dizziness, or leg swelling  GASTROINTESTINAL: No abdominal or epigastric pain. No nausea, vomiting, or hematemesis; No diarrhea or constipation. No melena or hematochezia.  NEUROLOGICAL: No headaches, memory loss, loss of strength, numbness, or tremors  SKIN: No itching, burning, rashes, or lesions   MUSCULOSKELETAL: No joint pain or swelling; No muscle, back, or extremity pain  PSYCHIATRIC: No depression, anxiety, mood swings, or difficulty sleeping        No Known Allergies  No pancakes/no [place] toast/prefers omelets in am RDOK (Unknown)      MEDICATIONS  (STANDING):  acetaminophen   Tablet .. 650 milliGRAM(s) Oral every 6 hours  amLODIPine   Tablet 10 milliGRAM(s) Oral daily  ATENolol  Tablet 50 milliGRAM(s) Oral every 12 hours  doxazosin Oral Tab/Cap - Peds 8 milliGRAM(s) Oral every 12 hours  enoxaparin Injectable 30 milliGRAM(s) SubCutaneous every 12 hours    MEDICATIONS  (PRN):  HYDROmorphone  Injectable 0.5 milliGRAM(s) IV Push every 3 hours PRN Moderate Pain (4 - 6)  HYDROmorphone  Injectable 1 milliGRAM(s) IV Push every 3 hours PRN Severe Pain (7 - 10)      Vital Signs Last 24 Hrs  T(C): 36.5 (16 Aug 2019 15:46), Max: 37 (15 Aug 2019 21:49)  T(F): 97.7 (16 Aug 2019 15:46), Max: 98.6 (15 Aug 2019 21:49)  HR: 68 (16 Aug 2019 15:46) (61 - 97)  BP: 112/70 (16 Aug 2019 15:46) (105/52 - 173/86)  BP(mean): 77 (16 Aug 2019 14:45) (70 - 121)  RR: 18 (16 Aug 2019 15:46) (11 - 25)  SpO2: 98% (16 Aug 2019 15:46) (95% - 100%)    PHYSICAL EXAM:    Constitutional: NAD,obese  HEENT: EOMI, no exophalmos  Neck: trachea midline, no thyroid enlargement  Respiratory: CTAB  Cardiovascular: S1 and S2, RRR  Gastrointestinal: BS+, soft, ntnd, healing bandaged laprascopic scars  Extremities: No peripheral edema  Neurological: A/O x 3, no focal deficits  Psychiatric: Normal mood, normal affect  Skin: No rashes, no acanthosis. multiple tattoos      LABS  08-16    141  |  104  |  10.0  ----------------------------<  126<H>  3.9   |  27.0  |  1.02    Ca    9.0      16 Aug 2019 05:36  Phos  4.5     08-16  Mg     2.2     08-16                            12.5   10.21 )-----------( 226      ( 16 Aug 2019 05:36 )             33.6           CAPILLARY BLOOD GLUCOSE

## 2019-08-16 NOTE — PROGRESS NOTE ADULT - ASSESSMENT
28 yo M with severe HTN Likely secondary to Pheochromocytoma s/p Robot-assisted left adrenalectomy POD#1    Neurological:  Pain controlled with Dilaudid PRN and PO Tylenol.  Transition to PO oxycodone.  Monitor for delirium.  Optimize sleep/wake cycle    Cardiovascular:  BP has remained controlled on current HTN regimen.  No change in hemodynamics s/p OR.      Resp:  Pulmonary toilet.  Incentive spirometry.  OOB to chair    Gastrointestinal: Reg diet    Heme:  SCD, DVT prophylaxis    ID:  None    Endo: No issues    Dispo: Pt stable for downgrade out of SICU awaiting confirmation from Surg onc team

## 2019-08-17 ENCOUNTER — TRANSCRIPTION ENCOUNTER (OUTPATIENT)
Age: 29
End: 2019-08-17

## 2019-08-17 LAB
ANION GAP SERPL CALC-SCNC: 10 MMOL/L — SIGNIFICANT CHANGE UP (ref 5–17)
BUN SERPL-MCNC: 15 MG/DL — SIGNIFICANT CHANGE UP (ref 8–20)
CALCIUM SERPL-MCNC: 9.1 MG/DL — SIGNIFICANT CHANGE UP (ref 8.6–10.2)
CHLORIDE SERPL-SCNC: 100 MMOL/L — SIGNIFICANT CHANGE UP (ref 98–107)
CO2 SERPL-SCNC: 27 MMOL/L — SIGNIFICANT CHANGE UP (ref 22–29)
CREAT SERPL-MCNC: 1.21 MG/DL — SIGNIFICANT CHANGE UP (ref 0.5–1.3)
GLUCOSE SERPL-MCNC: 93 MG/DL — SIGNIFICANT CHANGE UP (ref 70–115)
HCT VFR BLD CALC: 34.5 % — LOW (ref 39–50)
HGB BLD-MCNC: 12.3 G/DL — LOW (ref 13–17)
MAGNESIUM SERPL-MCNC: 2.2 MG/DL — SIGNIFICANT CHANGE UP (ref 1.6–2.6)
MCHC RBC-ENTMCNC: 31.3 PG — SIGNIFICANT CHANGE UP (ref 27–34)
MCHC RBC-ENTMCNC: 35.7 GM/DL — SIGNIFICANT CHANGE UP (ref 32–36)
MCV RBC AUTO: 87.8 FL — SIGNIFICANT CHANGE UP (ref 80–100)
PHOSPHATE SERPL-MCNC: 3.9 MG/DL — SIGNIFICANT CHANGE UP (ref 2.4–4.7)
PLATELET # BLD AUTO: 214 K/UL — SIGNIFICANT CHANGE UP (ref 150–400)
POTASSIUM SERPL-MCNC: 4.2 MMOL/L — SIGNIFICANT CHANGE UP (ref 3.5–5.3)
POTASSIUM SERPL-SCNC: 4.2 MMOL/L — SIGNIFICANT CHANGE UP (ref 3.5–5.3)
RBC # BLD: 3.93 M/UL — LOW (ref 4.2–5.8)
RBC # FLD: 12.1 % — SIGNIFICANT CHANGE UP (ref 10.3–14.5)
SODIUM SERPL-SCNC: 137 MMOL/L — SIGNIFICANT CHANGE UP (ref 135–145)
WBC # BLD: 10.57 K/UL — HIGH (ref 3.8–10.5)
WBC # FLD AUTO: 10.57 K/UL — HIGH (ref 3.8–10.5)

## 2019-08-17 PROCEDURE — 99232 SBSQ HOSP IP/OBS MODERATE 35: CPT

## 2019-08-17 RX ORDER — OXYCODONE HYDROCHLORIDE 5 MG/1
1 TABLET ORAL
Qty: 24 | Refills: 0
Start: 2019-08-17 | End: 2019-08-20

## 2019-08-17 RX ORDER — OXYCODONE HYDROCHLORIDE 5 MG/1
5 TABLET ORAL EVERY 4 HOURS
Refills: 0 | Status: DISCONTINUED | OUTPATIENT
Start: 2019-08-17 | End: 2019-08-18

## 2019-08-17 RX ORDER — OXYCODONE HYDROCHLORIDE 5 MG/1
10 TABLET ORAL EVERY 4 HOURS
Refills: 0 | Status: DISCONTINUED | OUTPATIENT
Start: 2019-08-17 | End: 2019-08-18

## 2019-08-17 RX ORDER — ATENOLOL 25 MG/1
1 TABLET ORAL
Qty: 60 | Refills: 0
Start: 2019-08-17 | End: 2019-09-15

## 2019-08-17 RX ORDER — DOCUSATE SODIUM 100 MG
100 CAPSULE ORAL
Refills: 0 | Status: DISCONTINUED | OUTPATIENT
Start: 2019-08-17 | End: 2019-08-18

## 2019-08-17 RX ORDER — AMLODIPINE BESYLATE 2.5 MG/1
1 TABLET ORAL
Qty: 30 | Refills: 0
Start: 2019-08-17 | End: 2019-09-15

## 2019-08-17 RX ADMIN — AMLODIPINE BESYLATE 10 MILLIGRAM(S): 2.5 TABLET ORAL at 05:23

## 2019-08-17 RX ADMIN — ENOXAPARIN SODIUM 30 MILLIGRAM(S): 100 INJECTION SUBCUTANEOUS at 17:02

## 2019-08-17 RX ADMIN — HYDROMORPHONE HYDROCHLORIDE 0.5 MILLIGRAM(S): 2 INJECTION INTRAMUSCULAR; INTRAVENOUS; SUBCUTANEOUS at 20:24

## 2019-08-17 RX ADMIN — HYDROMORPHONE HYDROCHLORIDE 1 MILLIGRAM(S): 2 INJECTION INTRAMUSCULAR; INTRAVENOUS; SUBCUTANEOUS at 02:53

## 2019-08-17 RX ADMIN — HYDROMORPHONE HYDROCHLORIDE 1 MILLIGRAM(S): 2 INJECTION INTRAMUSCULAR; INTRAVENOUS; SUBCUTANEOUS at 09:00

## 2019-08-17 RX ADMIN — HYDROMORPHONE HYDROCHLORIDE 1 MILLIGRAM(S): 2 INJECTION INTRAMUSCULAR; INTRAVENOUS; SUBCUTANEOUS at 09:36

## 2019-08-17 RX ADMIN — HYDROMORPHONE HYDROCHLORIDE 1 MILLIGRAM(S): 2 INJECTION INTRAMUSCULAR; INTRAVENOUS; SUBCUTANEOUS at 02:23

## 2019-08-17 RX ADMIN — Medication 650 MILLIGRAM(S): at 05:23

## 2019-08-17 RX ADMIN — Medication 650 MILLIGRAM(S): at 17:01

## 2019-08-17 RX ADMIN — Medication 650 MILLIGRAM(S): at 13:07

## 2019-08-17 RX ADMIN — Medication 100 MILLIGRAM(S): at 17:01

## 2019-08-17 RX ADMIN — Medication 650 MILLIGRAM(S): at 00:27

## 2019-08-17 RX ADMIN — HYDROMORPHONE HYDROCHLORIDE 0.5 MILLIGRAM(S): 2 INJECTION INTRAMUSCULAR; INTRAVENOUS; SUBCUTANEOUS at 15:50

## 2019-08-17 RX ADMIN — Medication 650 MILLIGRAM(S): at 05:53

## 2019-08-17 RX ADMIN — Medication 650 MILLIGRAM(S): at 23:56

## 2019-08-17 RX ADMIN — Medication 650 MILLIGRAM(S): at 11:46

## 2019-08-17 RX ADMIN — ATENOLOL 50 MILLIGRAM(S): 25 TABLET ORAL at 17:01

## 2019-08-17 RX ADMIN — ATENOLOL 50 MILLIGRAM(S): 25 TABLET ORAL at 05:23

## 2019-08-17 RX ADMIN — Medication 650 MILLIGRAM(S): at 16:01

## 2019-08-17 RX ADMIN — HYDROMORPHONE HYDROCHLORIDE 0.5 MILLIGRAM(S): 2 INJECTION INTRAMUSCULAR; INTRAVENOUS; SUBCUTANEOUS at 14:54

## 2019-08-17 RX ADMIN — HYDROMORPHONE HYDROCHLORIDE 0.5 MILLIGRAM(S): 2 INJECTION INTRAMUSCULAR; INTRAVENOUS; SUBCUTANEOUS at 19:54

## 2019-08-17 RX ADMIN — ENOXAPARIN SODIUM 30 MILLIGRAM(S): 100 INJECTION SUBCUTANEOUS at 05:22

## 2019-08-17 NOTE — PROGRESS NOTE ADULT - ASSESSMENT
29y Male POD 2 s/p robotic left adrenalectomy and feels well. His blood pressure has been fairly controlled with high of 152/89 last evening. Otherwise offers no complaints. Pain is well controlled. Discussed readiness to go home which he is considering.     - Possible d/c home today  - monitor bp closely  - pain control  - regular diet  - f/u pathology   - encourage oob  - needs outpatient f/u with endocrine and cards.

## 2019-08-17 NOTE — PROGRESS NOTE ADULT - ATTENDING COMMENTS
Patient seen and examined. BP in reasonable range now down to 2 antihypertensive agents. Awaiting pathology. Wounds clean. Abdomen soft, non-distended. Tolerating regular diet but now lower GI function and reports burping. Likely his mild post-op ileus. Will wait for resolution prior to discharge.
Seen and examined.      NAD  AAOx4  RRR  Non labored resp    Obs in ICU for potential hemodynamic lability.  Has remained stable.  No pressors, no antihypertensive drips.  OK for transfer to floor.

## 2019-08-17 NOTE — DISCHARGE NOTE PROVIDER - PROVIDER TOKENS
PROVIDER:[TOKEN:[[id number]]],PROVIDER:[TOKEN:[[id number]]] PROVIDER:[TOKEN:[[id number]]],PROVIDER:[TOKEN:[[id number]]],PROVIDER:[TOKEN:[[id number]]]

## 2019-08-17 NOTE — DISCHARGE NOTE PROVIDER - NSDCCPCAREPLAN_GEN_ALL_CORE_FT
PRINCIPAL DISCHARGE DIAGNOSIS  Diagnosis: Pheochromocytoma, unspecified laterality  Assessment and Plan of Treatment: Follow Up: Please call to make an appointment with Dr. Pack (Endocrinology) and Dr. Mosley 10-14 days after discharge. Also, please call to make an appointment with your primary care physician as per your usual schedule.   Activity: May return to normal activities as tolerated, however refrain from heavy lifting >10-15 pounds.   Diet: May continue regular diet.  Medications: Please take all home medications as prescribed by your primary care doctor. Hypertensive and pain medications have been prescribed for you. Please take it as prescribed, do not drive or operate heavy machinery while taking narcotics. You are encouraged to take over-the-counter tylenol and/or ibuprofen for pain relief when you feel your pain no longer warrants the use of narcotic pain medications, however DO NOT TAKE percocet and tylenol at the same time as they contain the same active ingredient (acetaminophen). Take only percocet OR tylenol.   Wound Care: Please, keep wound site clean and dry. You may shower, but do not bathe.   Patient is advised to RETURN TO THE EMERGENCY DEPARTMENT for any of the following - worsening pain, fever/chills, nausea/vomiting, alterned mental status, chest pain, shortness of breath, or any other new/worsening symptoms.

## 2019-08-17 NOTE — PROGRESS NOTE ADULT - SUBJECTIVE AND OBJECTIVE BOX
Hospital Day #    POD # 2 s/p robotic assisted left adrenalectomy    IV: SL    I&O's Summary    16 Aug 2019 07:01  -  17 Aug 2019 07:00  --------------------------------------------------------  IN: 1420 mL / OUT: 1690 mL / NET: -270 mL        diet:  regular- tolerating PO denies nausea or vomiting    Patient: afebrile VSS awake and alert sitting in chair comfortable.     T(C): 36.7 (08-17-19 @ 07:40), Max: 37.1 (08-16-19 @ 23:34)  HR: 72 (08-17-19 @ 07:40) (68 - 74)  BP: 148/87 (08-17-19 @ 07:40) (112/70 - 152/89)  RR: 20 (08-17-19 @ 07:40) (18 - 20)  SpO2: 96% (08-17-19 @ 05:20) (96% - 98%)  Wt(kg): --    chest: good air exchange, denies SOB or chest pain   Abdomen: soft , obese, non-distended, non-tender at present ( good relief with meds as needed ) surgical sites clean and stable , + bs, + flatus no bm yet   output: voiding adequately   Extremities: warm to toes without calf pain or tenderness, OOB and ambulates well          **LABS NOTED AND REVIEWED 8/17/19 )                      12.3   10.57 )-----------( 214      ( 17 Aug 2019 06:14 )             34.5     08-17    137  |  100  |  15.0  ----------------------------<  93  4.2   |  27.0  |  1.21    Ca    9.1      17 Aug 2019 06:14  Phos  3.9     08-17  Mg     2.2     08-17          xrays:    PAST MEDICAL & SURGICAL HISTORY:  HTN (hypertension)  No significant past surgical history          Impression: STABLE pod # 2 , TOLERATED SURGERY WELL, SURGICAL SITES STABLE, PAIN WELL CONTROLLED , TOLERATING po, STATES PASSED SOME FLATUS NO BM YET.  OOB and ambulating bp well managed at present   discuss with surgeon present situation and continued care.   Plan: continue present care and management, encouraged to continue ambulation, will start stool softener and continue to follow discharge planning.

## 2019-08-17 NOTE — PROGRESS NOTE ADULT - ASSESSMENT
29M with PMHx of hypertension, single episode seizure (3/2019) due to severely elevated BP with normal MRI brain and EEG presented to ED this past month with elevated BP. He had hx of long standing hx of global diaphoresis, sweaty palms and intermittent HA's. During this recent hospital admission, work-up including MRI revealed questionable slight nodular thickening of the left adrenal gland measuring up to 1.2 cm, also urine studies with elevated metanephrine levels concerning for pheochromocytoma done in 3/2019 (urine 24hr metanephrines total at 1649 normetamephrines 1256 and normal plasma metanephrines but elevated normetanphrines 160). BP was controlled with multiple medications this past admission and was sent home. Presents to the ED today for prep of elective robotic assisted laparoscopic adrenalectomy on 8/15.  spoke with the radiologist- no definite adrenal adenoma and questionable thickening on MRI abdomen. no retroperitoneal paraganglioma on CT angio c/a/p. unfortunately 143 MIBG scan cannot be done at [facility] and neither can a dotate PET scan    Possible pheo vs paraganglioma  -s/p lap left adrenalectomy 8/15  -radiological scans not convincing, spoke to radiology at length  -if patient still has persistent HTN, would need MIBG scan or PET scan (both outpatient) to r/o paraganglioma as a cause of persistent HTN  -patient still needs to follow up with us in the clinic, for which the patient agreed    HTN- slightly high. off lisinopril and off hydralazine. on bb and norvasc. primary team plans to wean off other agents as well    patient to be discharged tomorrow

## 2019-08-17 NOTE — DISCHARGE NOTE PROVIDER - CARE PROVIDER_API CALL
Joana Pack)  Internal Medicine  [address] [place] [address]  Phone: [phone]  Fax: [phone]  Follow Up Time:     Espinoza Mosley)  Surgery  [address] [place] [address]  Phone: [phone]  Fax: [phone]  Follow Up Time: Joana Pack)  Internal Medicine  [address] [place] [address]  Phone: [phone]  Fax: [phone]  Follow Up Time:     Espinoza Mosley)  Surgery  [address] [place] [address]  Phone: [phone]  Fax: [phone]  Follow Up Time:     Armand Jaramillo)  Surgery; Surgical Oncology  [address] [place] [address]  Phone: [phone]  Fax: [phone]  Follow Up Time:

## 2019-08-17 NOTE — PROGRESS NOTE ADULT - SUBJECTIVE AND OBJECTIVE BOX
Interval Events:  no overnight events  follow up on possible pheo    patient seen and examined at bedside.  feels good otherwise  off a few bp meds    REVIEW OF SYSTEMS:    CONSTITUTIONAL: No fever, weight loss, or fatigue  EYES: No eye pain, visual disturbances, or discharge  ENMT:  No difficulty hearing, tinnitus, vertigo; No sinus or throat pain  NECK: No pain or stiffness  RESPIRATORY: No cough, wheezing, chills or hemoptysis; No shortness of breath  CARDIOVASCULAR: No chest pain, palpitations, dizziness, or leg swelling  GASTROINTESTINAL: No abdominal or epigastric pain. No nausea, vomiting, or hematemesis; No diarrhea or constipation. No melena or hematochezia.  NEUROLOGICAL: No headaches, memory loss, loss of strength, numbness, or tremors  SKIN: No itching, burning, rashes, or lesions   MUSCULOSKELETAL: No joint pain or swelling; No muscle, back, or extremity pain  PSYCHIATRIC: No depression, anxiety, mood swings, or difficulty sleeping        No Known Allergies  No pancakes/no Chinese toast/prefers omelets in am RDOK (Unknown)      MEDICATIONS  (STANDING):  acetaminophen   Tablet .. 650 milliGRAM(s) Oral every 6 hours  amLODIPine   Tablet 10 milliGRAM(s) Oral daily  ATENolol  Tablet 50 milliGRAM(s) Oral every 12 hours  enoxaparin Injectable 30 milliGRAM(s) SubCutaneous every 12 hours    MEDICATIONS  (PRN):  HYDROmorphone  Injectable 0.5 milliGRAM(s) IV Push every 3 hours PRN Moderate Pain (4 - 6)  HYDROmorphone  Injectable 1 milliGRAM(s) IV Push every 3 hours PRN Severe Pain (7 - 10)      Vital Signs Last 24 Hrs  T(C): 36.7 (17 Aug 2019 07:40), Max: 37.1 (16 Aug 2019 23:34)  T(F): 98.1 (17 Aug 2019 07:40), Max: 98.8 (16 Aug 2019 23:34)  HR: 72 (17 Aug 2019 07:40) (62 - 76)  BP: 148/87 (17 Aug 2019 07:40) (105/52 - 152/89)  BP(mean): 77 (16 Aug 2019 14:45) (70 - 78)  RR: 20 (17 Aug 2019 07:40) (11 - 21)  SpO2: 96% (17 Aug 2019 05:20) (96% - 98%)    PHYSICAL EXAM:    Constitutional: NAD,obese  HEENT: EOMI, no exophalmos  Neck: trachea midline, no thyroid enlargement  Respiratory: CTAB  Cardiovascular: S1 and S2, RRR  Gastrointestinal: BS+, soft, ntnd, healing bandaged laprascopic scars  Extremities: No peripheral edema  Neurological: A/O x 3, no focal deficits  Psychiatric: Normal mood, normal affect  Skin: No rashes, no acanthosis. multiple tattoos          LABS  08-17    137  |  100  |  15.0  ----------------------------<  93  4.2   |  27.0  |  1.21    Ca    9.1      17 Aug 2019 06:14  Phos  3.9     08-17  Mg     2.2     08-17                            12.3   10.57 )-----------( 214      ( 17 Aug 2019 06:14 )             34.5           CAPILLARY BLOOD GLUCOSE

## 2019-08-17 NOTE — PROGRESS NOTE ADULT - ASSESSMENT
30yo Man with PMHx of hypertension, recently diagnosed with Pheo,  MRI revealed questionable slight nodular thickening of the left adrenal gland measuring up to 1.2 cm. Presents to the ED today for prep of elective robotic assisted laparoscopic adrenalectomy on 8/15. Medicine consulted for clearance, s/p Robot-assisted left adrenalectomy 15-Aug-2019     A/P    >Pheochromocytoma - s/p Robot-assisted left adrenalectomy 15-Aug-2019   BP is stable - c/w atenolol, amlodipine, monitor for labile BP - would recommend wean off anti-HTN meds - in office.   TTE showed LVEf of 70-75%, moderate LVH  defer further management as per surgery    >HTN - c/w current medication  monitor BP closely    >Obesity - low fat diet     >DVT PPX - per surgery    will follow 30yo Man with PMHx of hypertension, recently diagnosed with Pheo,  MRI revealed questionable slight nodular thickening of the left adrenal gland measuring up to 1.2 cm. Presents to the ED today for prep of elective robotic assisted laparoscopic adrenalectomy on 8/15. Medicine consulted for clearance, s/p Robot-assisted left adrenalectomy 15-Aug-2019     A/P    >Possible Pheochromocytoma - s/p Robot-assisted left adrenalectomy 15-Aug-2019   BP is stable - c/w atenolol, amlodipine, monitor for labile BP - would recommend wean off anti-HTN meds - in office.   TTE showed LVEf of 70-75%, moderate LVH  defer further management as per surgery    >HTN - c/w current medication  monitor BP closely    >Obesity - low fat diet     >DVT PPX - per surgery    will follow

## 2019-08-17 NOTE — PROGRESS NOTE ADULT - SUBJECTIVE AND OBJECTIVE BOX
chief complaint of Pheo/Chronic HTN    INTERVAL HPI/ OVERNIGHT EVENTS: Patient is seen and examined, s/p left adrenalectomy POD#1 , report mild abd. pain, denied nausea and vomiting    REVIEW OF SYSTEMS:    Denied fever, chills, abd. pain, nausea, vomiting, chest pain, SOB, headache, dizziness    PHYSICAL EXAM:    Vital Signs Last 24 Hrs  T(C): 36.7 (17 Aug 2019 07:40), Max: 37.1 (16 Aug 2019 23:34)  T(F): 98.1 (17 Aug 2019 07:40), Max: 98.8 (16 Aug 2019 23:34)  HR: 72 (17 Aug 2019 07:40) (68 - 74)  BP: 148/87 (17 Aug 2019 07:40) (112/70 - 152/89)  BP(mean): --  RR: 20 (17 Aug 2019 07:40) (18 - 20)  SpO2: 96% (17 Aug 2019 05:20) (96% - 98%)      GENERAL: obese  CHEST/LUNG: CTA b/l   HEART: S1S2+ audible  ABDOMEN: Obese, Soft, non-tender, normal bowel sounds  EXTREMITIES:  no edema  CNS: AAO X 3  Psychiatry: normal mood        LABS:                                   12.3   10.57 )-----------( 214      ( 17 Aug 2019 06:14 )             34.5   08-17    137  |  100  |  15.0  ----------------------------<  93  4.2   |  27.0  |  1.21    Ca    9.1      17 Aug 2019 06:14  Phos  3.9     08-17  Mg     2.2     08-17          MEDICATIONS  (STANDING):  acetaminophen   Tablet .. 650 milliGRAM(s) Oral every 6 hours  amLODIPine   Tablet 10 milliGRAM(s) Oral daily  ATENolol  Tablet 50 milliGRAM(s) Oral every 12 hours  docusate sodium 100 milliGRAM(s) Oral two times a day  enoxaparin Injectable 30 milliGRAM(s) SubCutaneous every 12 hours    MEDICATIONS  (PRN):  HYDROmorphone  Injectable 0.5 milliGRAM(s) IV Push every 3 hours PRN Moderate Pain (4 - 6)  HYDROmorphone  Injectable 1 milliGRAM(s) IV Push every 3 hours PRN Severe Pain (7 - 10)        RADIOLOGY & ADDITIONAL TEST

## 2019-08-17 NOTE — PROGRESS NOTE ADULT - SUBJECTIVE AND OBJECTIVE BOX
HPI/OVERNIGHT EVENTS:  No acute overnight events. Evening BP was 15/2/89. Seen and examined at bedside, patient feels well this morning. Pain is well controlled. He denies any nausea, vomiting,, chest pain, shortness of breath, or any new or concerning symptoms Ambulating independently, voiding, had bowel movement, and tolerating a regular diet. Dressings removed during rounds.      MEDICATIONS  (STANDING):  acetaminophen   Tablet .. 650 milliGRAM(s) Oral every 6 hours  amLODIPine   Tablet 10 milliGRAM(s) Oral daily  ATENolol  Tablet 50 milliGRAM(s) Oral every 12 hours  enoxaparin Injectable 30 milliGRAM(s) SubCutaneous every 12 hours    MEDICATIONS  (PRN):  HYDROmorphone  Injectable 0.5 milliGRAM(s) IV Push every 3 hours PRN Moderate Pain (4 - 6)  HYDROmorphone  Injectable 1 milliGRAM(s) IV Push every 3 hours PRN Severe Pain (7 - 10)      Vital Signs Last 24 Hrs  T(C): 37.1 (16 Aug 2019 23:34), Max: 37.1 (16 Aug 2019 23:34)  T(F): 98.8 (16 Aug 2019 23:34), Max: 98.8 (16 Aug 2019 23:34)  HR: 74 (16 Aug 2019 23:34) (62 - 76)  BP: 152/89 (16 Aug 2019 23:34) (105/52 - 158/86)  BP(mean): 77 (16 Aug 2019 14:45) (70 - 115)  RR: 18 (16 Aug 2019 15:46) (11 - 22)  SpO2: 98% (16 Aug 2019 23:34) (95% - 99%)    Physical Exam  GENERAL: NAD, well-developed, well nourished, resting comfortably in bed  HEAD:  Atraumatic, Normocephalic  CHEST/LUNG: Clear to auscultation bilaterally; No wheeze  HEART: Regular rate and rhythm  ABDOMEN: Soft, Nontender, Nondistended; Incisions x5 with dressings clean, dry, and intact. Removed. Incisions clean dry and nonerythematous.   EXTREMITIES: Full ROM      I&O's Detail    15 Aug 2019 07:01  -  16 Aug 2019 07:00  --------------------------------------------------------  IN:    sodium chloride 0.9%: 400 mL  Total IN: 400 mL    OUT:    Voided: 2550 mL  Total OUT: 2550 mL    Total NET: -2150 mL      16 Aug 2019 07:01  -  17 Aug 2019 01:15  --------------------------------------------------------  IN:    Oral Fluid: 1420 mL  Total IN: 1420 mL    OUT:    Voided: 1340 mL  Total OUT: 1340 mL    Total NET: 80 mL          LABS:                        12.5   10.21 )-----------( 226      ( 16 Aug 2019 05:36 )             33.6     08-16    141  |  104  |  10.0  ----------------------------<  126<H>  3.9   |  27.0  |  1.02    Ca    9.0      16 Aug 2019 05:36  Phos  4.5     08-16  Mg     2.2     08-16      PT/INR - ( 15 Aug 2019 05:48 )   PT: 9.9 sec;   INR: 0.86 ratio         PTT - ( 15 Aug 2019 05:48 )  PTT:34.1 sec

## 2019-08-17 NOTE — DISCHARGE NOTE PROVIDER - CARE PROVIDERS DIRECT ADDRESSES
,DirectAddress_Unknown,DirectAddress_Unknown ,DirectAddress_Unknown,DirectAddress_Unknown,[email or web address]

## 2019-08-17 NOTE — DISCHARGE NOTE PROVIDER - NSDCACTIVITY_GEN_ALL_CORE
No heavy lifting/straining/Showering allowed Stairs allowed/No heavy lifting/straining/Showering allowed/Walking - Indoors allowed/Walking - Outdoors allowed

## 2019-08-18 ENCOUNTER — TRANSCRIPTION ENCOUNTER (OUTPATIENT)
Age: 29
End: 2019-08-18

## 2019-08-18 VITALS
RESPIRATION RATE: 20 BRPM | TEMPERATURE: 99 F | DIASTOLIC BLOOD PRESSURE: 88 MMHG | OXYGEN SATURATION: 97 % | SYSTOLIC BLOOD PRESSURE: 148 MMHG | HEART RATE: 82 BPM

## 2019-08-18 LAB
ANION GAP SERPL CALC-SCNC: 10 MMOL/L — SIGNIFICANT CHANGE UP (ref 5–17)
BUN SERPL-MCNC: 14 MG/DL — SIGNIFICANT CHANGE UP (ref 8–20)
CALCIUM SERPL-MCNC: 9.1 MG/DL — SIGNIFICANT CHANGE UP (ref 8.6–10.2)
CHLORIDE SERPL-SCNC: 101 MMOL/L — SIGNIFICANT CHANGE UP (ref 98–107)
CO2 SERPL-SCNC: 26 MMOL/L — SIGNIFICANT CHANGE UP (ref 22–29)
CREAT SERPL-MCNC: 1.15 MG/DL — SIGNIFICANT CHANGE UP (ref 0.5–1.3)
GLUCOSE SERPL-MCNC: 98 MG/DL — SIGNIFICANT CHANGE UP (ref 70–115)
HCT VFR BLD CALC: 33.5 % — LOW (ref 39–50)
HGB BLD-MCNC: 12.1 G/DL — LOW (ref 13–17)
MAGNESIUM SERPL-MCNC: 2.1 MG/DL — SIGNIFICANT CHANGE UP (ref 1.8–2.6)
MCHC RBC-ENTMCNC: 31.3 PG — SIGNIFICANT CHANGE UP (ref 27–34)
MCHC RBC-ENTMCNC: 36.1 GM/DL — HIGH (ref 32–36)
MCV RBC AUTO: 86.8 FL — SIGNIFICANT CHANGE UP (ref 80–100)
PHOSPHATE SERPL-MCNC: 3.9 MG/DL — SIGNIFICANT CHANGE UP (ref 2.4–4.7)
PLATELET # BLD AUTO: 207 K/UL — SIGNIFICANT CHANGE UP (ref 150–400)
POTASSIUM SERPL-MCNC: 4.4 MMOL/L — SIGNIFICANT CHANGE UP (ref 3.5–5.3)
POTASSIUM SERPL-SCNC: 4.4 MMOL/L — SIGNIFICANT CHANGE UP (ref 3.5–5.3)
RBC # BLD: 3.86 M/UL — LOW (ref 4.2–5.8)
RBC # FLD: 11.9 % — SIGNIFICANT CHANGE UP (ref 10.3–14.5)
SODIUM SERPL-SCNC: 137 MMOL/L — SIGNIFICANT CHANGE UP (ref 135–145)
WBC # BLD: 8.88 K/UL — SIGNIFICANT CHANGE UP (ref 3.8–10.5)
WBC # FLD AUTO: 8.88 K/UL — SIGNIFICANT CHANGE UP (ref 3.8–10.5)

## 2019-08-18 PROCEDURE — 99232 SBSQ HOSP IP/OBS MODERATE 35: CPT

## 2019-08-18 RX ADMIN — Medication 100 MILLIGRAM(S): at 17:14

## 2019-08-18 RX ADMIN — Medication 100 MILLIGRAM(S): at 05:51

## 2019-08-18 RX ADMIN — OXYCODONE HYDROCHLORIDE 5 MILLIGRAM(S): 5 TABLET ORAL at 03:23

## 2019-08-18 RX ADMIN — Medication 650 MILLIGRAM(S): at 05:51

## 2019-08-18 RX ADMIN — ENOXAPARIN SODIUM 30 MILLIGRAM(S): 100 INJECTION SUBCUTANEOUS at 05:51

## 2019-08-18 RX ADMIN — ENOXAPARIN SODIUM 30 MILLIGRAM(S): 100 INJECTION SUBCUTANEOUS at 17:14

## 2019-08-18 RX ADMIN — AMLODIPINE BESYLATE 10 MILLIGRAM(S): 2.5 TABLET ORAL at 05:52

## 2019-08-18 RX ADMIN — ATENOLOL 50 MILLIGRAM(S): 25 TABLET ORAL at 05:51

## 2019-08-18 RX ADMIN — ATENOLOL 50 MILLIGRAM(S): 25 TABLET ORAL at 17:14

## 2019-08-18 RX ADMIN — OXYCODONE HYDROCHLORIDE 5 MILLIGRAM(S): 5 TABLET ORAL at 03:53

## 2019-08-18 RX ADMIN — Medication 650 MILLIGRAM(S): at 00:26

## 2019-08-18 NOTE — DISCHARGE NOTE NURSING/CASE MANAGEMENT/SOCIAL WORK - NSDCDPATPORTLINK_GEN_ALL_CORE
You can access the [facility] Patient Portal, offered by [facility], by registering with the following website: [email or web address]

## 2019-08-18 NOTE — PROGRESS NOTE ADULT - SUBJECTIVE AND OBJECTIVE BOX
chief complaint of Pheo/Chronic HTN    INTERVAL HPI/ OVERNIGHT EVENTS: Patient is seen and examined, s/p left adrenalectomy POD#2 , report mild abd. pain at the surgical site only, denied nausea and vomiting, one episode of loose stool, otherwise feels well.     REVIEW OF SYSTEMS:    Denied fever, chills, abd. pain, nausea, vomiting, chest pain, SOB, headache, dizziness    PHYSICAL EXAM:  Vital Signs Last 24 Hrs  T(C): 36.9 (18 Aug 2019 07:46), Max: 37.1 (18 Aug 2019 05:45)  T(F): 98.5 (18 Aug 2019 07:46), Max: 98.7 (18 Aug 2019 05:45)  HR: 69 (18 Aug 2019 07:46) (67 - 81)  BP: 127/8 (18 Aug 2019 07:46) (127/8 - 165/78)  BP(mean): --  RR: 20 (18 Aug 2019 07:46) (18 - 20)  SpO2: 97% (18 Aug 2019 05:45) (95% - 99%)      GENERAL: obese  CHEST/LUNG: CTA b/l   HEART: S1S2+ audible  ABDOMEN: Obese, Soft, non-tender, normal bowel sounds  EXTREMITIES:  no edema  CNS: AAO X 3  Psychiatry: normal mood        LABS:                                              12.1   8.88  )-----------( 207      ( 18 Aug 2019 06:35 )             33.5   08-18    137  |  101  |  14.0  ----------------------------<  98  4.4   |  26.0  |  1.15    Ca    9.1      18 Aug 2019 06:35  Phos  3.9     08-18  Mg     2.1     08-18              MEDICATIONS  (STANDING):  acetaminophen   Tablet .. 650 milliGRAM(s) Oral every 6 hours  amLODIPine   Tablet 10 milliGRAM(s) Oral daily  ATENolol  Tablet 50 milliGRAM(s) Oral every 12 hours  docusate sodium 100 milliGRAM(s) Oral two times a day  enoxaparin Injectable 30 milliGRAM(s) SubCutaneous every 12 hours    MEDICATIONS  (PRN):  oxyCODONE    IR 5 milliGRAM(s) Oral every 4 hours PRN Moderate Pain (4 - 6)  oxyCODONE    IR 10 milliGRAM(s) Oral every 4 hours PRN Severe Pain (7 - 10)          RADIOLOGY & ADDITIONAL TEST

## 2019-08-18 NOTE — PROGRESS NOTE ADULT - SUBJECTIVE AND OBJECTIVE BOX
Interval Events:  no overnight events  follow up on possible pheo    patient seen and examined at bedside.  feels good otherwise  BP elevated but patient reported being in pain at that time  patient was seen ambulating on the floor    REVIEW OF SYSTEMS:    CONSTITUTIONAL: No fever, weight loss, or fatigue  EYES: No eye pain, visual disturbances, or discharge  ENMT:  No difficulty hearing, tinnitus, vertigo; No sinus or throat pain  NECK: No pain or stiffness  RESPIRATORY: No cough, wheezing, chills or hemoptysis; No shortness of breath  CARDIOVASCULAR: No chest pain, palpitations, dizziness, or leg swelling  GASTROINTESTINAL: No abdominal or epigastric pain. No nausea, vomiting, or hematemesis; No diarrhea or constipation. No melena or hematochezia.  NEUROLOGICAL: No headaches, memory loss, loss of strength, numbness, or tremors  SKIN: No itching, burning, rashes, or lesions   MUSCULOSKELETAL: No joint pain or swelling; No muscle, back, or extremity pain  PSYCHIATRIC: No depression, anxiety, mood swings, or difficulty sleeping        No Known Allergies  No pancakes/no Telugu toast/prefers omelets in am RDOK (Unknown)      MEDICATIONS  (STANDING):  acetaminophen   Tablet .. 650 milliGRAM(s) Oral every 6 hours  amLODIPine   Tablet 10 milliGRAM(s) Oral daily  ATENolol  Tablet 50 milliGRAM(s) Oral every 12 hours  docusate sodium 100 milliGRAM(s) Oral two times a day  enoxaparin Injectable 30 milliGRAM(s) SubCutaneous every 12 hours    MEDICATIONS  (PRN):  oxyCODONE    IR 5 milliGRAM(s) Oral every 4 hours PRN Moderate Pain (4 - 6)  oxyCODONE    IR 10 milliGRAM(s) Oral every 4 hours PRN Severe Pain (7 - 10)      Vital Signs Last 24 Hrs  T(C): 36.9 (18 Aug 2019 07:46), Max: 37.1 (18 Aug 2019 05:45)  T(F): 98.5 (18 Aug 2019 07:46), Max: 98.7 (18 Aug 2019 05:45)  HR: 74 (18 Aug 2019 11:40) (67 - 81)  BP: 158/100 (18 Aug 2019 11:40) (127/86 - 165/78)  BP(mean): --  RR: 20 (18 Aug 2019 07:46) (18 - 20)  SpO2: 97% (18 Aug 2019 05:45) (95% - 99%)    PHYSICAL EXAM:    Constitutional: NAD,obese  HEENT: EOMI, no exophalmos  Neck: trachea midline, no thyroid enlargement  Respiratory: CTAB  Cardiovascular: S1 and S2, RRR  Gastrointestinal: BS+, soft, ntnd, healing laprascopic scars  Extremities: No peripheral edema  Neurological: A/O x 3, no focal deficits  Psychiatric: Normal mood, normal affect  Skin: No rashes, no acanthosis. multiple tattoos      LABS  08-18    137  |  101  |  14.0  ----------------------------<  98  4.4   |  26.0  |  1.15    Ca    9.1      18 Aug 2019 06:35  Phos  3.9     08-18  Mg     2.1     08-18                            12.1   8.88  )-----------( 207      ( 18 Aug 2019 06:35 )             33.5           CAPILLARY BLOOD GLUCOSE

## 2019-08-18 NOTE — PROGRESS NOTE ADULT - PROVIDER SPECIALTY LIST ADULT
Cardiology
Endocrinology
Endocrinology
Hospitalist
SICU
Surgery
Endocrinology
Hospitalist

## 2019-08-18 NOTE — PROGRESS NOTE ADULT - SUBJECTIVE AND OBJECTIVE BOX
Hospital Day #    POD # 3 s/p robotic left adrenelectomy     IV: SL    I&O's Summary    17 Aug 2019 07:01  -  18 Aug 2019 07:00  --------------------------------------------------------  IN: 0 mL / OUT: 1300 mL / NET: -1300 mL        diet: regular - tolerating , but some bloating     Patient: afebrile VSS awake and alert resting in lounge chair this am, no acute changes     T(C): 36.9 (08-18-19 @ 07:46), Max: 37.1 (08-18-19 @ 05:45)  HR: 69 (08-18-19 @ 07:46) (67 - 81)  BP: 127/8 (08-18-19 @ 07:46) (127/8 - 165/78)  RR: 20 (08-18-19 @ 07:46) (18 - 20)  SpO2: 97% (08-18-19 @ 05:45) (95% - 99%)  Wt(kg): --    chest: good air exchange, denies SOB, palpitations, or pain   Abdomen: softly distended, minimal surgical site pain or discomfort, surgical sites clean and stable, states some discomfort in bottom ( anal region - + bs , + flatus + small bm last evening ( believe discomfort bowels are resuming function )   output: voiding adequately   Extremities: warm to toes without calf pain or discomfort, OOB and ambulating well                ***LABS NOTED AND REVIEWED 8/18/18***               12.1   8.88  )-----------( 207      ( 18 Aug 2019 06:35 )             33.5     08-18    137  |  101  |  14.0  ----------------------------<  98  4.4   |  26.0  |  1.15    Ca    9.1      18 Aug 2019 06:35  Phos  3.9     08-18  Mg     2.1     08-18          xrays:    PAST MEDICAL & SURGICAL HISTORY:  HTN (hypertension)  No significant past surgical history          Impression: stable POD # 3, tolerated surgery well, improving , tolerating PO, surgical sites healing well, OOB an ambulating starting BF   Discuss with Surgeon present situation and continued care  Plan: continue present care and management possible discharge home 12- 24 hrs. Hospital Day #    POD # 3 s/p robotic left adrenelectomy     IV: SL    I&O's Summary    17 Aug 2019 07:01  -  18 Aug 2019 07:00  --------------------------------------------------------  IN: 0 mL / OUT: 1300 mL / NET: -1300 mL        diet: regular - tolerating , but some bloating     Patient: afebrile VSS awake and alert resting in lounge chair this am, no acute changes     T(C): 36.9 (08-18-19 @ 07:46), Max: 37.1 (08-18-19 @ 05:45)  HR: 69 (08-18-19 @ 07:46) (67 - 81)  BP: 127/8 (08-18-19 @ 07:46) (127/8 - 165/78)  RR: 20 (08-18-19 @ 07:46) (18 - 20)  SpO2: 97% (08-18-19 @ 05:45) (95% - 99%)  Wt(kg): --    chest: good air exchange, denies SOB, palpitations, or pain   Abdomen: softly distended, minimal surgical site pain or discomfort, surgical sites clean and stable, states some discomfort in bottom ( anal region - + bs , + flatus + small bm last evening ( believe discomfort bowels are resuming function )   output: voiding adequately   Extremities: warm to toes without calf pain or discomfort, OOB and ambulating well                ***LABS NOTED AND REVIEWED 8/18/18***               12.1   8.88  )-----------( 207      ( 18 Aug 2019 06:35 )             33.5     08-18    137  |  101  |  14.0  ----------------------------<  98  4.4   |  26.0  |  1.15    Ca    9.1      18 Aug 2019 06:35  Phos  3.9     08-18  Mg     2.1     08-18          xrays:    PAST MEDICAL & SURGICAL HISTORY:  HTN (hypertension)  No significant past surgical history          Impression: stable POD # 3, tolerated surgery well, improving , tolerating PO, surgical sites healing well, OOB an ambulating starting BF   Discuss with Surgeon present situation and continued care  Plan: continue present care and management possible discharge home 12- 24 hrs.    ***ADDENDUM** Seen with Dr. Mosley this afternoon feels much better stated had large bm and much better-  eager to go home now ;	discuss fully with patient discharge instruction anf full follow instructions given .

## 2019-08-18 NOTE — PROGRESS NOTE ADULT - ASSESSMENT
28yo Man with PMHx of hypertension, recently diagnosed with Pheo,  MRI revealed questionable slight nodular thickening of the left adrenal gland measuring up to 1.2 cm. Presents to the ED today for prep of elective robotic assisted laparoscopic adrenalectomy on 8/15. Medicine consulted for clearance, s/p Robot-assisted left adrenalectomy 15-Aug-2019     A/P    >Possible Pheochromocytoma - s/p Robot-assisted left adrenalectomy 15-Aug-2019   BP is stable - c/w atenolol, amlodipine, monitor for labile BP - would recommend wean off anti-HTN meds - in office.   TTE showed LVEf of 70-75%, moderate LVH  defer further management as per surgery    >HTN - c/w current medication. high values yesterday however may be trending down today  monitor BP   may be able to go home today if BP better     >Obesity - low fat diet     >DVT PPX - per surgery    discussed with pt and RN

## 2019-08-18 NOTE — PROGRESS NOTE ADULT - REASON FOR ADMISSION
Pheo/Chronic HTN

## 2019-08-18 NOTE — PROGRESS NOTE ADULT - ASSESSMENT
29M with PMHx of hypertension, single episode seizure (3/2019) due to severely elevated BP with normal MRI brain and EEG presented to ED this past month with elevated BP. He had hx of long standing hx of global diaphoresis, sweaty palms and intermittent HA's. During this recent hospital admission, work-up including MRI revealed questionable slight nodular thickening of the left adrenal gland measuring up to 1.2 cm, also urine studies with elevated metanephrine levels concerning for pheochromocytoma done in 3/2019 (urine 24hr metanephrines total at 1649 normetamephrines 1256 and normal plasma metanephrines but elevated normetanphrines 160). BP was controlled with multiple medications this past admission and was sent home. Presents to the ED today for prep of elective robotic assisted laparoscopic adrenalectomy on 8/15.  spoke with the radiologist- no definite adrenal adenoma and questionable thickening on MRI abdomen. no retroperitoneal paraganglioma on CT angio c/a/p. unfortunately 143 MIBG scan cannot be done at [facility] and neither can a dotate PET scan    Possible pheo vs paraganglioma  -s/p lap left adrenalectomy 8/15  -pathology pending  -radiological scans not convincing, spoke to radiology at length  -if patient still has persistent HTN, would need MIBG scan or PET scan (both outpatient) to r/o paraganglioma as a cause of persistent HTN  -patient still needs to follow up with us in the clinic, for which the patient agreed    HTN- still running high but might be a component of pain. off lisinopril and off hydralazine. on bb and norvasc.     possible discharged today

## 2019-08-20 LAB — SURGICAL PATHOLOGY STUDY: SIGNIFICANT CHANGE UP

## 2019-08-22 ENCOUNTER — RX RENEWAL (OUTPATIENT)
Age: 29
End: 2019-08-22

## 2019-08-23 ENCOUNTER — APPOINTMENT (OUTPATIENT)
Dept: ENDOCRINOLOGY | Facility: CLINIC | Age: 29
End: 2019-08-23
Payer: MEDICAID

## 2019-08-23 VITALS
WEIGHT: 315 LBS | HEIGHT: 72 IN | SYSTOLIC BLOOD PRESSURE: 150 MMHG | HEART RATE: 72 BPM | BODY MASS INDEX: 42.66 KG/M2 | DIASTOLIC BLOOD PRESSURE: 90 MMHG

## 2019-08-23 DIAGNOSIS — Z83.3 FAMILY HISTORY OF DIABETES MELLITUS: ICD-10-CM

## 2019-08-23 DIAGNOSIS — Z80.3 FAMILY HISTORY OF MALIGNANT NEOPLASM OF BREAST: ICD-10-CM

## 2019-08-23 DIAGNOSIS — Z82.49 FAMILY HISTORY OF ISCHEMIC HEART DISEASE AND OTHER DISEASES OF THE CIRCULATORY SYSTEM: ICD-10-CM

## 2019-08-23 DIAGNOSIS — Z78.9 OTHER SPECIFIED HEALTH STATUS: ICD-10-CM

## 2019-08-23 PROCEDURE — 99214 OFFICE O/P EST MOD 30 MIN: CPT

## 2019-08-23 NOTE — DATA REVIEWED
[FreeTextEntry1] : 3/15/19 Plasma normet 160 (elevated), met 29\par 3/15/19 24 hour urine met 1649, normet 1256.  urine VMA 69. 24 hour urine 5HIAA 5.9\par 3/2019 renal sono - no renal artery stenosis\par \par 7/30/19 MRI abdomen - slight nodular thickening Left adrenal gland, measures up to 1.2 cm\par \par 7/31/19 \par plasma normet 83, met<10\par chromogranin A 52\par calcitonin <1.0\par TSH 2.64, FT4 9.1\par A1c 5%\par Renin 0.786, [place]ton 9.7\par Ca 10.1, iPTH 29\par \par 8/2/19 24 hour urine norepi 145, dopa 503, epi 28\par \par 8/20/19 Left adrenal path - no tumor.  Left retroperitoneal mass  - mature ganglion cells and Scwann cells

## 2019-08-23 NOTE — PHYSICAL EXAM
[Alert] : alert [No Acute Distress] : no acute distress [Well Nourished] : well nourished [Normal Sclera/Conjunctiva] : normal sclera/conjunctiva [EOMI] : extra ocular movement intact [Well Developed] : well developed [Clear to Auscultation] : lungs were clear to auscultation bilaterally [Normal Rate and Effort] : normal respiratory rhythm and effort [Normal S1, S2] : normal S1 and S2 [Normal Rate] : heart rate was normal  [Not Tender] : non-tender [Normal Bowel Sounds] : normal bowel sounds [Soft] : abdomen soft [No Rash] : no rash [Normal Gait] : normal gait [Cranial Nerves Intact] : cranial nerves 2-12 were intact [Normal Reflexes] : deep tendon reflexes were 2+ and symmetric [Normal Insight/Judgement] : insight and judgment were intact [Oriented x3] : oriented to person, place, and time [Normal Affect] : the affect was normal [Normal Mood] : the mood was normal [de-identified] : obese, (+) incision site - no purulent discharge

## 2019-08-23 NOTE — REVIEW OF SYSTEMS
[Fatigue] : fatigue [Abdominal Pain] : abdominal pain [Recent Weight Gain (___ Lbs)] : no recent weight gain [Recent Weight Loss (___ Lbs)] : no recent weight loss [Blurry Vision] : no blurred vision [Chest Pain] : no chest pain [Palpitations] : no palpitations [Shortness Of Breath] : no shortness of breath [SOB on Exertion] : no shortness of breath during exertion [Nausea] : no nausea [Vomiting] : no vomiting was observed [Headache] : no headaches [Dizziness] : no dizziness [Depression] : no depression [Anxiety] : no anxiety [FreeTextEntry7] : at surgical site

## 2019-08-23 NOTE — HISTORY OF PRESENT ILLNESS
[FreeTextEntry1] : HTN since 10 years ago and has been on multiple BP meds and multiple hospitalizations for hypertensive urgency. On most recent admission - MRI abdomen showed Left adrenal gland thickening. Pt also with h/o elevated urine metanephrines.  He underwent Left adrenalectomy last week\par \par pain in abdominal area since surgery 8/15/19. he denies headache or palpitatations.  Now feels sweaty all the time. \par \par prior to hospitalization 7/2019 he was taking labetolol, hydralazine, norvasc\par post discharge 7/2019 and prior to surgery - amlodipine, atenolol, doxazosin, lisinopril, hydralazine\par post adrenalectomy - atenolol 50 mg BID, amlodipine 10 mg daily

## 2019-08-23 NOTE — REASON FOR VISIT
[Follow-Up: _____] : a [unfilled] follow-up visit [FreeTextEntry1] :  hospital follow up: adrenal, hypertension

## 2019-08-23 NOTE — ASSESSMENT
[FreeTextEntry1] : 29 year old male with uncontrolled HTN s/p Left adrenalectomy for presumed pheo in setting of Left adrenal thicking on MRI and h/o elevated urine metanephrines.  Path reports did not show Left adrenal tumor. ?paraganglionoma. Has been ruled out for renal artery stenosis, primary hyperaldo, hyperthyroidism, carcinoid tumors\par - repeat hormonal testing.  \par - will need more imaging - MIBG scan\par - will discuss retroperitoneal mass findings with pathology\par - restart doxazosin

## 2019-09-06 ENCOUNTER — APPOINTMENT (OUTPATIENT)
Dept: SURGICAL ONCOLOGY | Facility: CLINIC | Age: 29
End: 2019-09-06
Payer: MEDICAID

## 2019-09-06 VITALS
HEART RATE: 76 BPM | WEIGHT: 315 LBS | DIASTOLIC BLOOD PRESSURE: 96 MMHG | SYSTOLIC BLOOD PRESSURE: 146 MMHG | HEIGHT: 72 IN | BODY MASS INDEX: 42.66 KG/M2

## 2019-09-06 PROCEDURE — 99024 POSTOP FOLLOW-UP VISIT: CPT

## 2019-10-31 PROCEDURE — 93017 CV STRESS TEST TRACING ONLY: CPT

## 2019-10-31 PROCEDURE — 84100 ASSAY OF PHOSPHORUS: CPT

## 2019-10-31 PROCEDURE — 80048 BASIC METABOLIC PNL TOTAL CA: CPT

## 2019-10-31 PROCEDURE — 80053 COMPREHEN METABOLIC PANEL: CPT

## 2019-10-31 PROCEDURE — 88307 TISSUE EXAM BY PATHOLOGIST: CPT

## 2019-10-31 PROCEDURE — 93005 ELECTROCARDIOGRAM TRACING: CPT

## 2019-10-31 PROCEDURE — 86900 BLOOD TYPING SEROLOGIC ABO: CPT

## 2019-10-31 PROCEDURE — 85730 THROMBOPLASTIN TIME PARTIAL: CPT

## 2019-10-31 PROCEDURE — 99285 EMERGENCY DEPT VISIT HI MDM: CPT

## 2019-10-31 PROCEDURE — 88331 PATH CONSLTJ SURG 1 BLK 1SPC: CPT

## 2019-10-31 PROCEDURE — 75574 CT ANGIO HRT W/3D IMAGE: CPT

## 2019-10-31 PROCEDURE — 86901 BLOOD TYPING SEROLOGIC RH(D): CPT

## 2019-10-31 PROCEDURE — 78452 HT MUSCLE IMAGE SPECT MULT: CPT

## 2019-10-31 PROCEDURE — A9500: CPT

## 2019-10-31 PROCEDURE — 85027 COMPLETE CBC AUTOMATED: CPT

## 2019-10-31 PROCEDURE — S2900: CPT

## 2019-10-31 PROCEDURE — 88304 TISSUE EXAM BY PATHOLOGIST: CPT

## 2019-10-31 PROCEDURE — 71045 X-RAY EXAM CHEST 1 VIEW: CPT

## 2019-10-31 PROCEDURE — 36415 COLL VENOUS BLD VENIPUNCTURE: CPT

## 2019-10-31 PROCEDURE — 85610 PROTHROMBIN TIME: CPT

## 2019-10-31 PROCEDURE — 86850 RBC ANTIBODY SCREEN: CPT

## 2019-10-31 PROCEDURE — 86923 COMPATIBILITY TEST ELECTRIC: CPT

## 2019-10-31 PROCEDURE — C1889: CPT

## 2019-10-31 PROCEDURE — 83735 ASSAY OF MAGNESIUM: CPT

## 2019-12-06 ENCOUNTER — APPOINTMENT (OUTPATIENT)
Dept: ENDOCRINOLOGY | Facility: CLINIC | Age: 29
End: 2019-12-06

## 2019-12-23 NOTE — H&P ADULT - HISTORY OF PRESENT ILLNESS
27 y/o AA Male 1/2 PPD smoker w/ PMHx uncontrolled HTN, Right foot fracture on 2/22 p/w seizure at home, witnessed by father who endorses 5 min of jerking movements in his chair, with vomiting and, no loss of bowel or bladder, followed bv diaphoresis and 10 min of confusion. Pt admits smoking marijuana prior to event. Admits to 5/10 HA since the episode that has progressively self-resolved. Denies F/C, N/V, Vision changes, other illicit drug use, CP, SOB, palpitations. current smoker 29 y/o AA Male 1/2 PPD smoker w/ PMHx uncontrolled HTN (can't afford meds), Right foot fracture on 2/22, presented with seizure while at home, witnessed by father who endorses 5 min of jerking movements in his chair, with vomiting and, no loss of bowel or bladder continence, followed by diaphoresis and 10 min of confusion. Pt admits smoking marijuana prior to event. Admits to 5/10 HA since the episode that has been slowly resolving.   In ED patient was alert and oriented, however was noted to have severely elevated BP (SBP in 240's) unresponsive to several doses of hydralazine and labetalol, eventually started on nicardipine infusion and admitted to ICU.   Patient denies history of prior seizures.  Says he is aware of his HTN but doesn't have insurance so doesn't take meds or follow with any physicians.

## 2020-01-27 ENCOUNTER — APPOINTMENT (OUTPATIENT)
Dept: ENDOCRINOLOGY | Facility: CLINIC | Age: 30
End: 2020-01-27
Payer: MEDICAID

## 2020-01-27 VITALS
RESPIRATION RATE: 20 BRPM | HEIGHT: 72 IN | OXYGEN SATURATION: 99 % | WEIGHT: 315 LBS | SYSTOLIC BLOOD PRESSURE: 138 MMHG | HEART RATE: 80 BPM | DIASTOLIC BLOOD PRESSURE: 80 MMHG | BODY MASS INDEX: 42.66 KG/M2

## 2020-01-27 PROCEDURE — 99213 OFFICE O/P EST LOW 20 MIN: CPT

## 2020-01-27 NOTE — REVIEW OF SYSTEMS
[Wheezing] : wheezing was heard [SOB on Exertion] : shortness of breath during exertion [Nocturia] : nocturia [Headache] : headaches [Heat Intolerance] : heat intolerant [Fatigue] : no fatigue [Recent Weight Gain (___ Lbs)] : no recent weight gain [Recent Weight Loss (___ Lbs)] : no recent weight loss [Visual Field Defect] : no visual field defect [Blurry Vision] : no blurred vision [Dysphagia] : no dysphagia [Dysphonia] : no dysphonia [Neck Pain] : no neck pain [Chest Pain] : no chest pain [Palpitations] : no palpitations [Shortness Of Breath] : no shortness of breath [Nausea] : no nausea [Vomiting] : no vomiting was observed [Constipation] : no constipation [Diarrhea] : no diarrhea [Polyuria] : no polyuria [Joint Pain] : no joint pain [Joint Stiffness] : no joint stiffness [Dry Skin] : no dry skin [Tremors] : no tremors [Depression] : no depression [Anxiety] : no anxiety [Polydipsia] : no polydipsia [Cold Intolerance] : cold tolerant [de-identified] : correlates to high BP

## 2020-01-27 NOTE — HISTORY OF PRESENT ILLNESS
[FreeTextEntry1] : S/P Robotic left adrenalectomy on 8/15/2019. Previously found to have left adrenal gland enlargement and elevated urine metanephrines.Final pathology, ruled out pheochromocytoma. He was on five antihypertensive medications prior to surgery and is now on three.\par \par Current regimen\par Amlodipine 10 mg daily\par Atenolol 50 mg daily\par Doxazosin -unsure of dose, pt to call office\par \par BP in office 138/80\par Does not check BP at home\par No hospitalizations for HTN since adrenalectomy

## 2020-01-27 NOTE — ASSESSMENT
[FreeTextEntry1] : HTN s/p adrenalectomy\par -Disscused paraganglioma. Literature given for pt to read on what we are currently working up as a cause of hypertension in a young man.\par -MIBG scan ordered. In "how to prepare for scan" instructions, it states to discontinue antihypertensives for three days. Instructued nuclear medicine team to contact our office in order to discuss this as pt has htn crisis in past and would not benefit from this\par -24 hr Urine and serum adrenaline hormones rx given\par -Will call with lab results and MIBG scan results as soon as completed \par \par Follow up with Dr. Gutierrez 5/2020

## 2020-03-04 NOTE — DISCHARGE NOTE NURSING/CASE MANAGEMENT/SOCIAL WORK - NSDCPETBCESMAN_GEN_ALL_CORE
If you are a smoker, it is important for your health to stop smoking. Please be aware that second hand smoke is also harmful. Controlled with current regime

## 2020-04-09 NOTE — HISTORY OF PRESENT ILLNESS
A&O x4 but forgetful. Aggressive and agitated often, Hx COPD O2 5L inhalers help much. Productive cough, neg covid. Denies pain. Wants to go home today.    [de-identified] : 29 year old man with long history of resistant HTN, on multiple medications, with evidence of end organ damage (i.e. hypertrophic cardiomyopathy), recent hospitalizations for hypertensive urgency associated with palpitations, syncope\par Found to have left adrenal gland enlargement ?mass on MRI\par Also with elevated urine metanephrines\par \par Underwent a robotic-assisted laparoscopic left adrenalectomy on 8/15/19, on final pathology no mass consistent with pheochromocytoma was found.\par He was on five antihypertensive medications prior to surgery and is now on two, however the endocrine team added back doxazosin.

## 2020-05-15 ENCOUNTER — APPOINTMENT (OUTPATIENT)
Dept: ENDOCRINOLOGY | Facility: CLINIC | Age: 30
End: 2020-05-15
Payer: MEDICAID

## 2020-05-15 PROCEDURE — 99213 OFFICE O/P EST LOW 20 MIN: CPT | Mod: 95

## 2020-05-15 RX ORDER — OXYCODONE 5 MG/1
5 TABLET ORAL EVERY 4 HOURS
Qty: 20 | Refills: 0 | Status: DISCONTINUED | COMMUNITY
Start: 2019-08-22 | End: 2020-05-15

## 2020-05-15 NOTE — REVIEW OF SYSTEMS
[Recent Weight Gain (___ Lbs)] : no recent weight gain [Fatigue] : no fatigue [Visual Field Defect] : no visual field defect [Recent Weight Loss (___ Lbs)] : no recent weight loss [Chest Pain] : no chest pain [Palpitations] : no palpitations [Shortness Of Breath] : no shortness of breath [SOB on Exertion] : no shortness of breath on exertion [Wheezing] : no wheezing [Constipation] : no constipation [Nausea] : no nausea [Diarrhea] : no diarrhea [Vomiting] : no vomiting [Abdominal Pain] : no abdominal pain [Dizziness] : no dizziness [Headaches] : no headaches [Anxiety] : no anxiety [Tremors] : no tremors [Depression] : no depression [Stress] : no stress

## 2020-05-15 NOTE — REASON FOR VISIT
[Home] : at home, [unfilled] , at the time of the visit. [Medical Office: ([facility])___] : at the medical office located in  [Patient] : the patient [Follow - Up] : a follow-up visit [Other___] : [unfilled]

## 2020-05-15 NOTE — PHYSICAL EXAM
[Alert] : alert [Well Nourished] : well nourished [Healthy Appearance] : healthy appearance [Well Developed] : well developed [Normal Sclera/Conjunctiva] : normal sclera/conjunctiva [EOMI] : extra ocular movement intact [No Respiratory Distress] : no respiratory distress [Normal Gait] : normal gait [No Rash] : no rash [No Accessory Muscle Use] : no accessory muscle use [Oriented x3] : oriented to person, place, and time [Normal Affect] : the affect was normal [Normal Insight/Judgement] : insight and judgment were intact [Normal Mood] : the mood was normal

## 2020-05-15 NOTE — ASSESSMENT
[FreeTextEntry1] : HTN s/p Left adrenalectomy - did not have MIBG scan due to pandemic\par - will proceed with MIBG scan\par -24 hour urine testing and blood testing needed, see below\par - cont current BP regimen which is controlling BP

## 2020-08-14 ENCOUNTER — APPOINTMENT (OUTPATIENT)
Dept: ENDOCRINOLOGY | Facility: CLINIC | Age: 30
End: 2020-08-14
Payer: MEDICAID

## 2020-08-14 VITALS
BODY MASS INDEX: 42.66 KG/M2 | DIASTOLIC BLOOD PRESSURE: 102 MMHG | HEART RATE: 108 BPM | OXYGEN SATURATION: 98 % | SYSTOLIC BLOOD PRESSURE: 158 MMHG | HEIGHT: 72 IN | WEIGHT: 315 LBS

## 2020-08-14 PROCEDURE — 99213 OFFICE O/P EST LOW 20 MIN: CPT

## 2020-08-14 NOTE — HISTORY OF PRESENT ILLNESS
[FreeTextEntry1] : Interval Hx - mom w brain aneurysm and curerntly hospitalized. did not have MIBG scan or repeat lab testing\par \par S/P Robotic left adrenalectomy on 8/15/2019. Previously found to have left adrenal gland enlargement and elevated urine metanephrines.Final pathology, ruled out pheochromocytoma. He was on five antihypertensive medications prior to surgery and is now on three.\par No hospitalizations for HTN since adrenalectomy \par No episodic symptoms. \par \par Current regimen - adherent with meds, missed doses yesterday\par Amlodipine 10 mg daily\par Atenolol 50 mg daily\par Doxazosin 25 mg daily per pt, called pharmacy 8 mg BID\par \par

## 2020-08-14 NOTE — ASSESSMENT
[FreeTextEntry1] : HTN s/p Left adrenalectomy - BP slightly elevated b/c he missed BP meds yesterday\par - proceed with MIBG scan\par -24 hour urine testing and blood testing needed, see below\par - cont current BP regimen, must not miss doses

## 2020-08-14 NOTE — REVIEW OF SYSTEMS
[Fatigue] : no fatigue [Recent Weight Gain (___ Lbs)] : no recent weight gain [Recent Weight Loss (___ Lbs)] : no recent weight loss [Visual Field Defect] : no visual field defect [Chest Pain] : no chest pain [Palpitations] : no palpitations [Shortness Of Breath] : no shortness of breath [SOB on Exertion] : no shortness of breath on exertion [Wheezing] : no wheezing [Nausea] : no nausea [Constipation] : no constipation [Abdominal Pain] : no abdominal pain [Vomiting] : no vomiting [Diarrhea] : no diarrhea [Headaches] : no headaches [Dizziness] : no dizziness [Tremors] : no tremors [Depression] : no depression [Anxiety] : no anxiety [Stress] : no stress

## 2020-08-14 NOTE — PHYSICAL EXAM
[Alert] : alert [Well Nourished] : well nourished [No Acute Distress] : no acute distress [Healthy Appearance] : healthy appearance [EOMI] : extra ocular movement intact [No LAD] : no lymphadenopathy [Thyroid Not Enlarged] : the thyroid was not enlarged [No Thyroid Nodules] : no palpable thyroid nodules [No Accessory Muscle Use] : no accessory muscle use [Clear to Auscultation] : lungs were clear to auscultation bilaterally [Normal S1, S2] : normal S1 and S2 [Normal Rate] : heart rate was normal [Oriented x3] : oriented to person, place, and time [No Edema] : no peripheral edema [Cranial Nerves Intact] : cranial nerves 2-12 were intact [Normal Affect] : the affect was normal [Normal Mood] : the mood was normal [Normal Insight/Judgement] : insight and judgment were intact

## 2020-09-01 NOTE — ED ADULT TRIAGE NOTE - CHIEF COMPLAINT QUOTE
pt with lac to left wrist which he reports getting with a drill at work yesterday. pt is hypertensive at triage [Negative] : Heme/Lymph

## 2020-09-02 ENCOUNTER — INPATIENT (INPATIENT)
Facility: HOSPITAL | Age: 30
LOS: 1 days | Discharge: HOME | DRG: 304 | End: 2020-09-04
Attending: HOSPITALIST | Admitting: HOSPITALIST
Payer: COMMERCIAL

## 2020-09-02 VITALS
DIASTOLIC BLOOD PRESSURE: 155 MMHG | WEIGHT: 315 LBS | OXYGEN SATURATION: 97 % | SYSTOLIC BLOOD PRESSURE: 202 MMHG | HEART RATE: 65 BPM | HEIGHT: 72 IN | RESPIRATION RATE: 18 BRPM | TEMPERATURE: 98 F

## 2020-09-02 DIAGNOSIS — R56.9 UNSPECIFIED CONVULSIONS: ICD-10-CM

## 2020-09-02 LAB
ALBUMIN SERPL ELPH-MCNC: 4.8 G/DL — SIGNIFICANT CHANGE UP (ref 3.3–5.2)
ALP SERPL-CCNC: 70 U/L — SIGNIFICANT CHANGE UP (ref 40–120)
ALT FLD-CCNC: 33 U/L — SIGNIFICANT CHANGE UP
ANION GAP SERPL CALC-SCNC: 31 MMOL/L — HIGH (ref 5–17)
APTT BLD: 35.2 SEC — SIGNIFICANT CHANGE UP (ref 27.5–35.5)
AST SERPL-CCNC: 28 U/L — SIGNIFICANT CHANGE UP
BASOPHILS # BLD AUTO: 0.11 K/UL — SIGNIFICANT CHANGE UP (ref 0–0.2)
BASOPHILS NFR BLD AUTO: 0.9 % — SIGNIFICANT CHANGE UP (ref 0–2)
BILIRUB SERPL-MCNC: 0.4 MG/DL — SIGNIFICANT CHANGE UP (ref 0.4–2)
BUN SERPL-MCNC: 13 MG/DL — SIGNIFICANT CHANGE UP (ref 8–20)
CALCIUM SERPL-MCNC: 9.9 MG/DL — SIGNIFICANT CHANGE UP (ref 8.6–10.2)
CHLORIDE SERPL-SCNC: 100 MMOL/L — SIGNIFICANT CHANGE UP (ref 98–107)
CO2 SERPL-SCNC: 12 MMOL/L — LOW (ref 22–29)
CREAT SERPL-MCNC: 1.33 MG/DL — HIGH (ref 0.5–1.3)
EOSINOPHIL # BLD AUTO: 0.24 K/UL — SIGNIFICANT CHANGE UP (ref 0–0.5)
EOSINOPHIL NFR BLD AUTO: 1.9 % — SIGNIFICANT CHANGE UP (ref 0–6)
GLUCOSE SERPL-MCNC: 180 MG/DL — HIGH (ref 70–99)
HCT VFR BLD CALC: 46 % — SIGNIFICANT CHANGE UP (ref 39–50)
HGB BLD-MCNC: 15.9 G/DL — SIGNIFICANT CHANGE UP (ref 13–17)
IMM GRANULOCYTES NFR BLD AUTO: 0.9 % — SIGNIFICANT CHANGE UP (ref 0–1.5)
INR BLD: 0.98 RATIO — SIGNIFICANT CHANGE UP (ref 0.88–1.16)
LYMPHOCYTES # BLD AUTO: 24.9 % — SIGNIFICANT CHANGE UP (ref 13–44)
LYMPHOCYTES # BLD AUTO: 3.21 K/UL — SIGNIFICANT CHANGE UP (ref 1–3.3)
MAGNESIUM SERPL-MCNC: 2.4 MG/DL — SIGNIFICANT CHANGE UP (ref 1.6–2.6)
MCHC RBC-ENTMCNC: 32 PG — SIGNIFICANT CHANGE UP (ref 27–34)
MCHC RBC-ENTMCNC: 34.6 GM/DL — SIGNIFICANT CHANGE UP (ref 32–36)
MCV RBC AUTO: 92.6 FL — SIGNIFICANT CHANGE UP (ref 80–100)
MONOCYTES # BLD AUTO: 1.12 K/UL — HIGH (ref 0–0.9)
MONOCYTES NFR BLD AUTO: 8.7 % — SIGNIFICANT CHANGE UP (ref 2–14)
NEUTROPHILS # BLD AUTO: 8.1 K/UL — HIGH (ref 1.8–7.4)
NEUTROPHILS NFR BLD AUTO: 62.7 % — SIGNIFICANT CHANGE UP (ref 43–77)
PHOSPHATE SERPL-MCNC: 4.1 MG/DL — SIGNIFICANT CHANGE UP (ref 2.4–4.7)
PLATELET # BLD AUTO: 295 K/UL — SIGNIFICANT CHANGE UP (ref 150–400)
POTASSIUM SERPL-MCNC: 4 MMOL/L — SIGNIFICANT CHANGE UP (ref 3.5–5.3)
POTASSIUM SERPL-SCNC: 4 MMOL/L — SIGNIFICANT CHANGE UP (ref 3.5–5.3)
PROT SERPL-MCNC: 8.4 G/DL — SIGNIFICANT CHANGE UP (ref 6.6–8.7)
PROTHROM AB SERPL-ACNC: 11.4 SEC — SIGNIFICANT CHANGE UP (ref 10.6–13.6)
RBC # BLD: 4.97 M/UL — SIGNIFICANT CHANGE UP (ref 4.2–5.8)
RBC # FLD: 12.2 % — SIGNIFICANT CHANGE UP (ref 10.3–14.5)
SODIUM SERPL-SCNC: 143 MMOL/L — SIGNIFICANT CHANGE UP (ref 135–145)
TROPONIN T SERPL-MCNC: <0.01 NG/ML — SIGNIFICANT CHANGE UP (ref 0–0.06)
WBC # BLD: 12.9 K/UL — HIGH (ref 3.8–10.5)
WBC # FLD AUTO: 12.9 K/UL — HIGH (ref 3.8–10.5)

## 2020-09-02 PROCEDURE — 73030 X-RAY EXAM OF SHOULDER: CPT | Mod: 26,RT

## 2020-09-02 PROCEDURE — 99291 CRITICAL CARE FIRST HOUR: CPT

## 2020-09-02 PROCEDURE — 70450 CT HEAD/BRAIN W/O DYE: CPT | Mod: 26

## 2020-09-02 PROCEDURE — 99223 1ST HOSP IP/OBS HIGH 75: CPT

## 2020-09-02 PROCEDURE — 93010 ELECTROCARDIOGRAM REPORT: CPT

## 2020-09-02 RX ORDER — ACETAMINOPHEN 500 MG
650 TABLET ORAL EVERY 6 HOURS
Refills: 0 | Status: DISCONTINUED | OUTPATIENT
Start: 2020-09-02 | End: 2020-09-04

## 2020-09-02 RX ORDER — KETOROLAC TROMETHAMINE 30 MG/ML
15 SYRINGE (ML) INJECTION ONCE
Refills: 0 | Status: DISCONTINUED | OUTPATIENT
Start: 2020-09-02 | End: 2020-09-02

## 2020-09-02 RX ORDER — MORPHINE SULFATE 50 MG/1
4 CAPSULE, EXTENDED RELEASE ORAL EVERY 6 HOURS
Refills: 0 | Status: DISCONTINUED | OUTPATIENT
Start: 2020-09-02 | End: 2020-09-03

## 2020-09-02 RX ORDER — SODIUM NITROPRUSSIDE 50 MG/2ML
0.3 INJECTION INTRAVENOUS
Qty: 100 | Refills: 0 | Status: DISCONTINUED | OUTPATIENT
Start: 2020-09-02 | End: 2020-09-02

## 2020-09-02 RX ORDER — ATENOLOL 25 MG/1
100 TABLET ORAL DAILY
Refills: 0 | Status: DISCONTINUED | OUTPATIENT
Start: 2020-09-02 | End: 2020-09-04

## 2020-09-02 RX ORDER — ATENOLOL 25 MG/1
50 TABLET ORAL ONCE
Refills: 0 | Status: COMPLETED | OUTPATIENT
Start: 2020-09-02 | End: 2020-09-02

## 2020-09-02 RX ORDER — LABETALOL HCL 100 MG
20 TABLET ORAL ONCE
Refills: 0 | Status: COMPLETED | OUTPATIENT
Start: 2020-09-02 | End: 2020-09-02

## 2020-09-02 RX ORDER — HEPARIN SODIUM 5000 [USP'U]/ML
5000 INJECTION INTRAVENOUS; SUBCUTANEOUS EVERY 8 HOURS
Refills: 0 | Status: DISCONTINUED | OUTPATIENT
Start: 2020-09-02 | End: 2020-09-04

## 2020-09-02 RX ORDER — DOXAZOSIN MESYLATE 4 MG
8 TABLET ORAL AT BEDTIME
Refills: 0 | Status: DISCONTINUED | OUTPATIENT
Start: 2020-09-02 | End: 2020-09-04

## 2020-09-02 RX ORDER — MORPHINE SULFATE 50 MG/1
4 CAPSULE, EXTENDED RELEASE ORAL ONCE
Refills: 0 | Status: DISCONTINUED | OUTPATIENT
Start: 2020-09-02 | End: 2020-09-02

## 2020-09-02 RX ORDER — TRAMADOL HYDROCHLORIDE 50 MG/1
25 TABLET ORAL EVERY 6 HOURS
Refills: 0 | Status: DISCONTINUED | OUTPATIENT
Start: 2020-09-02 | End: 2020-09-03

## 2020-09-02 RX ORDER — MORPHINE SULFATE 50 MG/1
2 CAPSULE, EXTENDED RELEASE ORAL EVERY 6 HOURS
Refills: 0 | Status: DISCONTINUED | OUTPATIENT
Start: 2020-09-02 | End: 2020-09-02

## 2020-09-02 RX ORDER — LABETALOL HCL 100 MG
10 TABLET ORAL EVERY 4 HOURS
Refills: 0 | Status: DISCONTINUED | OUTPATIENT
Start: 2020-09-02 | End: 2020-09-04

## 2020-09-02 RX ORDER — MIDAZOLAM HYDROCHLORIDE 1 MG/ML
4 INJECTION, SOLUTION INTRAMUSCULAR; INTRAVENOUS ONCE
Refills: 0 | Status: DISCONTINUED | OUTPATIENT
Start: 2020-09-02 | End: 2020-09-02

## 2020-09-02 RX ORDER — AMLODIPINE BESYLATE 2.5 MG/1
10 TABLET ORAL DAILY
Refills: 0 | Status: DISCONTINUED | OUTPATIENT
Start: 2020-09-02 | End: 2020-09-04

## 2020-09-02 RX ADMIN — Medication 15 MILLIGRAM(S): at 21:57

## 2020-09-02 RX ADMIN — Medication 20 MILLIGRAM(S): at 21:52

## 2020-09-02 RX ADMIN — Medication 8 MILLIGRAM(S): at 21:56

## 2020-09-02 RX ADMIN — AMLODIPINE BESYLATE 10 MILLIGRAM(S): 2.5 TABLET ORAL at 21:55

## 2020-09-02 RX ADMIN — Medication 2 MILLIGRAM(S): at 16:20

## 2020-09-02 RX ADMIN — Medication 20 MILLIGRAM(S): at 16:45

## 2020-09-02 RX ADMIN — MORPHINE SULFATE 4 MILLIGRAM(S): 50 CAPSULE, EXTENDED RELEASE ORAL at 21:51

## 2020-09-02 RX ADMIN — Medication 15 MILLIGRAM(S): at 18:56

## 2020-09-02 RX ADMIN — ATENOLOL 50 MILLIGRAM(S): 25 TABLET ORAL at 18:57

## 2020-09-02 RX ADMIN — HEPARIN SODIUM 5000 UNIT(S): 5000 INJECTION INTRAVENOUS; SUBCUTANEOUS at 21:46

## 2020-09-02 NOTE — ED PROVIDER NOTE - CLINICAL SUMMARY MEDICAL DECISION MAKING FREE TEXT BOX
pt with h/o HTN possible pheochromocytoma   had a seizure episode , pror to arrival and one in ED   neuro consulted no meds will eval in am   cardiology consulted

## 2020-09-02 NOTE — ED ADULT TRIAGE NOTE - CHIEF COMPLAINT QUOTE
Pt BIBA A&Ox3 c/o seizure, hx of seizures, states he does not take any seizure meds. Pt takes medication for HTN and is compliant. Pt was found on floor at work. No obvious head trauma or injury. Pt hypertensive in triage. MD Martinez called to bedside for eval.

## 2020-09-02 NOTE — H&P ADULT - HISTORY OF PRESENT ILLNESS
29 YO M w/ PMHx of HTN and multiple seizure episodes in the setting of HTN emergency w/ concerns for pheochromocytoma [s/p left adrenalectomy on 8/15 at [facility]]. Pt BIBA to the ED due to 2 seizure episodes. 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 A few minutes later after waking up, he had another seizure episode that was witnessed by his colleagues and at that time they called the ambulance. Pt unable to recall what happened and how long the seizures lasted for. Pt reports biting his lip, urinary incontinence and right shoulder pain after falling to the ground during the 2nd seizure episode. Shoulder pain is aching, 8/10, worse w/ movement, better w/ rest and pain medication. Pt denies fever, chills, nausea, sweating, recent illness, vision changes, sick contacts. Pt reports HTN medication compliance.  In the ED: pt had /155. S/p  Atenolol 50mg X1, Labetalol 20mg IV X1, Ativan 2mg X1 29 YO M w/ PMHx of HTN and multiple seizure episodes in the setting of HTN emergency w/ concerns for pheochromocytoma [s/p left adrenalectomy on 8/15 at [facility]]. Pt BIBA to the ED due to 2 seizure episodes. 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 A few minutes later after waking up, he had another seizure episode that was witnessed by his colleagues and at that time they called the ambulance. Pt unable to recall what happened and how long the seizures lasted for. Pt reports biting his lip, urinary incontinence and right shoulder pain after falling to the ground during the 2nd seizure episode. Shoulder pain is aching, 8/10, worse w/ movement, better w/ rest and pain medication. Pt denies fever, chills, nausea, sweating, recent illness, numbness/tingling, vision changes, sick contacts. Pt reports HTN medication compliance.  In the ED: pt had /155. S/p  Atenolol 50mg X1, Labetalol 20mg IV X1, Ativan 2mg X1

## 2020-09-02 NOTE — CONSULT NOTE ADULT - SUBJECTIVE AND OBJECTIVE BOX
[facility]                                                               Office: [address] [address]                                                              Telephone: [phone]. Fax:[phone]                                                                        CARDIOLOGY CONSULTATION NOTE                                                                                               Chief complaint:  Lost consciousness and fell at work        HPI:    30M h/o obesity, early onset chronic HTN (dx age 19), recurrent hospitalizations for HTN urgencies with prior episode of seizure, and workup found with elevated metanephrines and MRI abdomen found with L-adrenal gland thickening s/p robotic L-adrenalectomy (8/2019) for suspected pheochromocytoma, previously was on 5 antihypertensives now on 3 agents (amlodipine 10mg, atenolol 50mg and doxazosin 8mg BID), 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     Patient reports adherent to his medications, does not check home BP as without cuff, last office visit with his endocrinologist (Dr. Gutierrez) on 8/23/20 with office BP of 150/90, pending repeat blood work of metanephrines and MIBG scan to determine if paraganglioma as adrenal pathology did not reveal any tumor. Prior workup had ruled out renal artery stenosis and primary hyperaldodosteronism. He denies any ilicit drug use other than marijuana, no other new medications recently. Having difficulty losing weight. Currently denies any complains other than R-shoulder pain due to the fall. Denies prior syncope episode and no recurrence of seizure, not on any antiepileptic.        REVIEW OF SYMPTOMS:     CONSTITUTIONAL: No fever, weight loss, or fatigue  ENMT:  No difficulty hearing, tinnitus, vertigo; No sinus or throat pain  NECK: No pain or stiffness  CARDIOVASCULAR: as per HPI  RESPIRATORY: No Dyspnea on exertion, Shortness of breath, cough, wheezing  : No dysuria, no hematuria   GI: No dark color stool, no melena, no diarrhea, no constipation, no abdominal pain   NEURO: No headache, no dizziness, no slurred speech   MUSCULOSKELETAL: No joint pain or swelling; No muscle, back, or extremity pain  PSYCH: No agitation, no anxiety.    ALL OTHER REVIEW OF SYSTEMS ARE NEGATIVE.      PREVIOUS DIAGNOSTIC TESTING    ECHO FINDINGS:  < from: TTE Echo Complete w/Doppler (07.31.19 @ 17:54) >    Summary:   1. There is moderate concentric left ventricular hypertrophy.   2. Hyperdynamic global left ventricular systolic function.   3. Left ventricular ejection fraction, by visual estimation, is 70 to   75%.   4. Spectral Doppler shows impaired relaxation pattern of left   ventricular myocardial filling (Grade I diastolic dysfunction).   5. Normal right ventricular size and function.   6. The left atrium is normal in size.   7. The right atrium is normal in size.   8. Structurally normal mitral valve, with normal leaflet excursion.   9. Normal trileaflet aortic valve with normal opening.  10. There is no evidence of pericardial effusion.    < end of copied text >          STRESS FINDINGS:      CATHETERIZATION FINDINGS:         ALLERGIES: Allergies    No Known Allergies        PAST MEDICAL HISTORY  see above      PAST SURGICAL HISTORY  L-adrenalectomy       FAMILY HISTORY:  No CAD      SOCIAL HISTORY:    +marijuana, social alcohol       CURRENT MEDICATIONS:  MEDICATIONS  (STANDING):  ATENolol  Tablet 50 milliGRAM(s) Oral Once  doxazosin 8 milliGRAM(s) Oral at bedtime    MEDICATIONS  (PRN):         HOME MEDICATIONS:  reviewed in EMR      Vital Signs Last 24 Hrs  T(C): 36.7 (09-02-20 @ 16:00), Max: 36.7 (09-02-20 @ 16:00)  T(F): 98 (09-02-20 @ 16:00), Max: 98 (09-02-20 @ 16:00)  HR: 85 (09-02-20 @ 18:11) (65 - 104)  BP: 186/116 (09-02-20 @ 18:11) (182/100 - 215/118)  BP(mean): --  RR: 18 (09-02-20 @ 18:11) (18 - 20)  SpO2: 98% (09-02-20 @ 18:11) (93% - 98%)  I&O's Summary      Appearance: Comfortable. No acute distress, Obese  HEENT:  Head and neck: Atraumatic. Normocephalic.  Normal oral mucosa, PERRL, Neck is full, no JVD, No carotid bruit.   Neurologic: A&Ox 3, no focal deficits. EOMI, Cranial nerves are intact.  Lymphatic: No cervical lymphadenopathy  Cardiovascular: Normal S1 S2, RRR, No murmur, rubs/gallops. No JVD, No edema  Respiratory: Lungs clear to auscultation  Gastrointestinal:  Soft, Non-tender, + BS; truncal obesity   Lower Extremities: No edema  Psychiatry: Patient is calm. No agitation. Mood & affect appropriate  Skin: No rashes/ecchymoses/cyanosis/ulcers visualized on the face, hands or feet.      Labs:                         15.9   12.90 )-----------( 295      ( 02 Sep 2020 17:03 )             46.0     09-02    143  |  100  |  13.0  ----------------------------<  180<H>  4.0   |  12.0<L>  |  1.33<H>    Ca    9.9      02 Sep 2020 17:03    TPro  8.4  /  Alb  4.8  /  TBili  0.4  /  DBili  x   /  AST  28  /  ALT  33  /  AlkPhos  70  09-02     02 Sep 2020 17:03 Troponin <0.01 ng/mL / Creatine Kinase x     /  CKMB x     / CPK Mass Assay % x          TELEMETRY: Reviewed    ECG:  Sinus 94, left axis deviation, LVH, TWI lead I/avL. 	    Surgical Pathology Report:   ACCESSION No:  [id number]  RENU VALLECILLO                 2        Surgical Final Report          Final Diagnosis  1. Left adrenal gland, adrenalectomy:  -Adrenal gland, no tumor identified    2. Left retroperitoneal mass, biopsy:  -Matureganglion cells and Schwann cells  -See comment    Verified by: Willow Cannon MD  (Electronic Signature)  Reported on: 08/20/19 09:33 EDT, [address] [place] [id number]  _________________________________________________________________    Comment  Suggest correlation with clinical and imaging studies    Portions of case reviewed intradepartmentally    Clinical History  Left adrenal mass, pheochromocytoma    Specimen(s) Submitted  1     Left adrenal gland  2     Left retroperitoneal mass    Gross Description  1. The specimen is recieved fresh for intraoperative consultation  labeled "Left adrenal gland" and consists of an adrenal gland  with attached periadrenal adipose tissue, and several separate  pieces of soft tissue. The adrenal gland measures 6 x 3.3 x 1 cm,  weighing 15.7 g. The surface of the gland is unremarkable and has  been inked black. The attached fat is pale yellow tan and grossly  unremarkable. The adrenal gland is serially sectioned at 2-3 mm  intervals toreveal a bright orange yellow cortex, and brown tan  medulla, with no lesions grossly identified. The separate pieces  are focally hemorrhagic red and yellow tan and measure 2.2 x 1.5  x 0.2 cm in aggregate. No frozen section is performed. A gross  evaluation only is performed. The tissue is submitted entirely in  14 cassettes.    1A = separate pieces submitted entirely  1B-1N = serial sections of entire adrenal gland with entire  attached adipose tissue    INTRAOPERATIVE CONSULTATION:  1. Left adrenal gland -  Gross evaluation: No discrete lesion idenified        RENU VALLECILLO                 2      Surgical Final Report        By: Dr. ANTHONY Cannon    2. The specimen is recieved fresh for intraoperative consultation  labeled"Left retroperitoneal mass". It consists of 1 fragment of  focally hemorrhagic yellow tan soft to rubbery tissue, measuring  0.7 x 0.6 x 0.2 cm. The tissue is entirely frozen and submitted  entirely in one frozen section cassette.    INTRAOPERATIVE CONSULTATION:  2. Left retroperitoneal mass-  Frozen section: Neural tissue with ganglion cells    By: Dr. ANTHONY Cannon, 3:55 PM    KV 08/16/2019 12:49 (08.15.19 @ 15:06)

## 2020-09-02 NOTE — ED ADULT NURSE NOTE - OBJECTIVE STATEMENT
Assumed care of patient from LISSY Grossman at 1640 in critical care, alert and oriented x4, s/p seizure. CT scan completed. Pt hypertensive, Dr Brennan at bedside. IV labetalol given per Md order. Pt given ativan prior to receiving care for active seizure in ED per RN. EKG done. NSR noted to monitor. Safety maintained. No obvious signs of injury noted. pt stated he was at work and had a seizure at work and was found on the floor. Denies hitting head. Stated he has hx of seizures but does not take any medications for it.

## 2020-09-02 NOTE — H&P ADULT - ASSESSMENT
31 YO M w/ PMHx of HTN and multiple seizure episodes in the setting of HTN emergency w/ concerns for pheochromocytoma [s/p left adrenalectomy on 8/15 at [facility]]. Pt presented to the ED due to 2 seizure episodes. Admitted for seizure episode and HTN emergency. Cardiology consulted.     -Admit to medicine  -Bed: Telemetry     Seizure episode   -Unclear etiology at this time; HTN urgency vs recurrence of pheochromocytoma?  -Initial concerns for pheochromocytoma s/p adrenalectomy in 8/15  -No further episodes during admission   -C/w Ativan 2mg prn seizure   -Trop neg X1  -K, Mg, Phos wnl. Monitor electrolytes   -Neurology consulted by ED provider. Recommends to hold AEM at this time.   -C/w telemetry   -Seizure precautions   -F/u neuroendocrine labs  -Drug screen pending      HTN emergency   -Elevated BP noted. Pt denies CP, SOB, palpitations   -BP improving  -C/w Amlodipine, Doxazosin, Atenolol (increased to 100mg from 50mg)   -CT head negative for acute changes   -TTE pending   -NM MIBG body scan pending   -Cardiology consult noted    Anion gap acidosis w/ ABHILASH   -Likely 2/2 seizure episode   -Hydration encouraged  -Continue to monitor     Leukocytosis  -Likely reactive in setting of seizure episode  -Pt denies fever, chills, recent illness  -F/u CBC in am     Hyperglycemia   -  -Pt obese, BMI 42.7  -F/u Hb A1c     DVT ppx: heparin subcut    Code status: Full code 29 YO M w/ PMHx of HTN and multiple seizure episodes in the setting of HTN emergency w/ concerns for pheochromocytoma [s/p left adrenalectomy on 8/15 at [facility]]. Pt BIBA to the ED due to 2 seizure episodes. Admitted for seizure episode and HTN emergency. Cardiology consulted.     -Admit to medicine  -Bed: Telemetry     Seizure episode   -Unclear etiology at this time; HTN urgency vs recurrence of pheochromocytoma?  -Initial concerns for pheochromocytoma s/p adrenalectomy in 8/15  -No further episodes during admission   -C/w Ativan 2mg prn seizure   -Trop neg X1, EKG noted  -K, Mg, Phos wnl. Monitor electrolytes   -Neurology consulted by ED provider. Recommends to hold AEM at this time.   -C/w telemetry   -Seizure precautions   -F/u neuroendocrine labs  -Drug screen pending      HTN emergency   -Elevated BP noted. Pt denies CP, SOB, palpitations   -BP improving  -C/w Amlodipine, Doxazosin, Atenolol (increased to 100mg from 50mg)   -CT head negative for acute changes   -TTE pending   -NM MIBG body scan pending   -Cardiology consult noted    Anion gap acidosis w/ ABHILASH   -Likely 2/2 seizure episode   -Hydration encouraged  -Continue to monitor     Right shoulder pain  -s/p fall from seizure episode  -Pt unable to raise hand above 90 degrees  -C/w pain management  -X-ray shoulder performed. f/u report     Leukocytosis  -Likely reactive in setting of seizure episode  -Pt denies fever, chills, recent illness  -F/u CBC in am     Hyperglycemia   -  -Pt obese, BMI 42.7  -F/u Hb A1c     DVT ppx: heparin subcut    Code status: Full code 29 YO M w/ PMHx of HTN and multiple seizure episodes in the setting of HTN emergency w/ concerns for pheochromocytoma [s/p left adrenalectomy on 8/15 at [facility]]. Pt BIBA to the ED due to 2 seizure episodes. Admitted for seizure episode and HTN emergency. Cardiology consulted.     -Admit to medicine  -Bed: Telemetry     Seizure episode   -Unclear etiology at this time; HTN urgency vs recurrence of pheochromocytoma?  -Initial concerns for pheochromocytoma s/p adrenalectomy in 8/15  -No further episodes during admission   -C/w Ativan 2mg prn seizure   -Trop neg X1, EKG noted  -K, Mg, Phos wnl. Monitor electrolytes   -Neurology consulted by ED provider. Recommends to hold AEM at this time.   -Cardio consult noted  -Endocrinology consulted  -C/w telemetry   -Seizure precautions   -F/u neuroendocrine labs  -Drug screen pending      HTN emergency   -Elevated BP noted. Pt denies CP, SOB, palpitations   -BP improving  -C/w Amlodipine, Doxazosin, Atenolol (increased to 100mg from 50mg)   -CT head negative for acute changes   -TTE pending   -NM MIBG body scan pending   -Cardiology consult noted    Anion gap acidosis w/ ABHILASH   -Likely 2/2 seizure episode   -Hydration encouraged  -Continue to monitor     Right shoulder pain  -s/p fall from seizure episode  -Pt unable to raise hand above 90 degrees  -C/w pain management  -X-ray shoulder performed. f/u report     Leukocytosis  -Likely reactive in setting of seizure episode  -Pt denies fever, chills, recent illness  -F/u CBC in am     Hyperglycemia   -  -Pt obese, BMI 42.7  -F/u Hb A1c     DVT ppx: heparin subcut    Code status: Full code 29 YO M w/ PMHx of HTN and multiple seizure episodes in the setting of HTN emergency w/ concerns for pheochromocytoma [s/p left adrenalectomy on 8/15 at [facility]]. Pt BIBA to the ED due to 2 seizure episodes. Admitted for seizure episode and HTN emergency. Cardiology consulted.     -Admit to medicine  -Bed: Telemetry     Seizure episode   -Unclear etiology at this time; likely due to HTN urgency   -one episode of seizure prior to ED arrival and one in the ED s/p Ativan   -electrolytes wnl, CT head unremarkable    -Neurology consulted by ED provider. Recommends to hold antiseizure meds for now    -admit to telemetry   -Seizure precautions   -Drug screen pending      HTN emergency  -uncontrolled BP, pt has been on 5 bp meds in the the past but now on 3 meds, s/p left adrenalectomy in 8/15/19 for suspected pheochromocytoma  -persistently elevated BP in the ED s/p Amlodipine, Doxazosin, Atenolol and labetalol 20mg IV x 2dose   -current BP in 170s, cont with Labetalol 10mg IV push PRN for sbp>180    -CT head negative for acute changes   -TTE pending   -NM MIBG body scan pending   -Cardiology following     Anion gap acidosis w/ ABHILASH   -Likely 2/2 seizure episode   -Hydration encouraged  -Continue to monitor     Right shoulder pain  -s/p fall from seizure episode  -Pt unable to raise hand above 90 degrees  -C/w pain management  -X-ray shoulder performed. f/u report     Leukocytosis  -Likely reactive in setting of seizure episode  -Pt denies fever, chills, recent illness  -F/u CBC in am     Hyperglycemia   -  -Pt obese, BMI 42.7  -F/u Hb A1c     DVT ppx: heparin subcut    Code status: Full code 29 YO M w/ PMHx of HTN and multiple seizure episodes in the setting of HTN emergency w/ concerns for pheochromocytoma [s/p left adrenalectomy on 8/15 at [facility]]. Pt BIBA to the ED due to 2 seizure episodes. Admitted for seizure episode and HTN emergency. Cardiology, Neurology consults appreciated. Endo consult pending.       -Admit to medicine  -Bed: Telemetry     Seizure episode   -Unclear etiology at this time; likely due to HTN urgency   -one episode of seizure prior to ED arrival and one in the ED s/p Ativan   -electrolytes wnl, CT head unremarkable    -Neurology consulted by ED provider. Recommends to hold antiseizure meds for now    -admit to telemetry   -Seizure precautions   -Drug screen pending      HTN emergency  -uncontrolled BP, pt has been on 5 bp meds in the the past but now on 3 meds, s/p left adrenalectomy in 8/15/19 for suspected pheochromocytoma  -persistently elevated BP in the ED s/p Amlodipine, Doxazosin, Atenolol and labetalol 20mg IV x 2dose   -current BP in 170s, cont with Labetalol 10mg IV push PRN for sbp>180    -CT head negative for acute changes   -TTE pending   -NM MIBG body scan pending   -Cardiology following   -Endocrine consulted     Anion gap acidosis w/ ABHILASH   -Likely 2/2 seizure episode   -Hydration encouraged  -Continue to monitor     Right shoulder pain  -s/p fall from seizure episode  -Pt unable to raise hand above 90 degrees  -C/w pain management  -X-ray shoulder performed. f/u report     Leukocytosis  -Likely reactive in setting of seizure episode  -Pt denies fever, chills, recent illness  -F/u CBC in am     Hyperglycemia   -  -Pt obese, BMI 42.7  -F/u Hb A1c     DVT ppx: heparin subcut    Code status: Full code 29 YO M w/ PMHx of HTN and multiple seizure episodes in the setting of HTN emergency w/ concerns for pheochromocytoma [s/p left adrenalectomy on 8/15 at [facility]]. Pt BIBA to the ED due to 2 seizure episodes. Admitted for seizure episode and HTN emergency. Cardiology, Neurology consults appreciated. Endo consult pending.       -Admit to medicine  -Bed: Telemetry     Seizure episode   -Unclear etiology at this time; likely due to HTN urgency   -one episode of seizure prior to ED arrival and one in the ED s/p Ativan   -electrolytes wnl, CT head unremarkable    -Neurology consulted by ED provider. Recommends to hold antiseizure meds for now    -admit to telemetry   -Seizure precautions   -Drug screen pending      HTN emergency  -uncontrolled BP, pt has been on 5 bp meds in the the past but now on 3 meds, s/p left adrenalectomy in 8/15/19 for suspected pheochromocytoma  -persistently elevated BP in the ED s/p Amlodipine, Doxazosin, Atenolol and labetalol 20mg IV x 2dose   -current BP in 170s, cont with Labetalol 10mg IV push PRN for sbp>180    -CT head negative for acute changes   -TTE pending   -NM MIBG body scan pending,  24 hr urien metanephrine catecholamine and cortisol ordered   -Cardiology following   -Endocrine consulted     Anion gap acidosis w/ ABHILASH   -Likely 2/2 seizure episode   -Hydration encouraged  -Continue to monitor     Right shoulder pain  -s/p fall from seizure episode  -Pt unable to raise hand above 90 degrees  -C/w pain management  -X-ray shoulder performed. f/u report     Leukocytosis  -Likely reactive in setting of seizure episode  -Pt denies fever, chills, recent illness  -F/u CBC in am     Hyperglycemia   -  -Pt obese, BMI 42.7  -F/u Hb A1c     DVT ppx: heparin subcut    Code status: Full code

## 2020-09-02 NOTE — ED PROVIDER NOTE - OBJECTIVE STATEMENT
pt presents to ED s/p one seizure states related to hypertension   diastolic upon arrival 150  pt was answering questions and had another seizure  h/o ohne seizure last year no meds     labetolol for HTN   consult cardiology / neuro and endocrine

## 2020-09-02 NOTE — ED ADULT NURSE REASSESSMENT NOTE - NS ED NURSE REASSESS COMMENT FT1
pt c/o R shoulder pain. md mondragon called to bedside for eval. 15mg toradol IVP verbal order given by MD mondragon.

## 2020-09-02 NOTE — CONSULT NOTE ADULT - SUBJECTIVE AND OBJECTIVE BOX
HPI:  31 YO M w/ PMHx of HTN and multiple seizure episodes in the setting of HTN emergency  Pt BIBA to the ED due to 2 seizure episodes. 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 A few minutes later after waking up, he had another seizure episode that was witnessed by his colleagues and at that time they called the ambulance. Pt unable to recall what happened and how long the seizures lasted for. Pt reports biting his lip, urinary incontinence and right shoulder pain after falling to the ground during the 2nd seizure episode. Shoulder pain is aching, 8/10, worse w/ movement, better w/ rest and pain medication. Pt denies fever, chills, nausea, sweating, recent illness, numbness/tingling, vision changes, sick contacts. Pt reports HTN medication compliance.  In the ED: pt had /155. S/p  Atenolol 50mg X1, Labetalol 20mg IV X1, Ativan 2mg X1 (02 Sep 2020 21:04)      PAST MEDICAL & SURGICAL HISTORY:  HTN (hypertension) since age 19   mulitple work up negative- Pt  had  elevated metnephrines  in urine- HAve to go to old chart as out  pt  to see levels - but  had left adreanlectomy inAug 2019 - for hyperplasia- NO pheochromocytoma  found    pt had been taken off lisinopril and HCTZ post op    and currentl Rregimen was doxazosin  atenolol and amlodipine.  Pt was to have MIBG to r/o paraganglioma asBP was elevated at last Office visit - 's   No asthma no DM          FAMILY HISTORY:  Family history of brain aneurysm: mother curently in [facility]-  he states the aneurysm was foudn to be  hereditary i  FH: HTN (hypertension)      SOCIAL HISTORY:  does not drink alcohol    no smoking   REVIEW OF SYSTEMS:    Constitutional: No fever, no chills, no change in weight.    Eyes: No eye swelling,no  blurry vision, no redness, no loss of vision.    Neck: No neck pain, no change in voice.    Lungs: No shortness of breath, no wheezing, no cough    CV: No chest pain, no palpitations, no pain with walking.    GI: No nausea, no vomiting, no constipation, no diarrhea, no abdominal pain    : No urinary frequency, no blood in urine, no urinary burning, no difficulty voiding.    Musculoskeletal: rifght shoulder hurts     Skin: No rash, no infections.    Neurologic: No headaches, no weakness, no burning or pain in feet, no tremor.    Endocrine: No heat intolerance, no cold intolerance, no increased sweating, no shakiness between meals.    Psych: No depression, no anxiety, no trouble concentrating    MEDICATIONS  (STANDING):  amLODIPine   Tablet 10 milliGRAM(s) Oral daily  ATENolol  Tablet 100 milliGRAM(s) Oral daily  doxazosin 8 milliGRAM(s) Oral at bedtime  heparin   Injectable 5000 Unit(s) SubCutaneous every 8 hours    MEDICATIONS  (PRN):  acetaminophen   Tablet .. 650 milliGRAM(s) Oral every 6 hours PRN Temp greater or equal to 38C (100.4F), Mild Pain (1 - 3)  labetalol Injectable 10 milliGRAM(s) IV Push every 4 hours PRN SBP>180  LORazepam   Injectable 2 milliGRAM(s) IV Push every 2 hours PRN seizure  morphine  - Injectable 4 milliGRAM(s) IV Push every 6 hours PRN Severe Pain (7 - 10)  traMADol 25 milliGRAM(s) Oral every 6 hours PRN Moderate Pain (4 - 6)      Allergies    No Known Allergies    Intolerances    No pancakes/no Kiswahili toast/prefers omelets in am RDOK (Unknown)        CAPILLARY BLOOD GLUCOSE      POCT Blood Glucose.: 196 mg/dL (02 Sep 2020 16:26)      PHYSICAL EXAM:    Vital Signs Last 24 Hrs  T(C): 36.7 (03 Sep 2020 01:00), Max: 36.7 (02 Sep 2020 16:00)  T(F): 98 (03 Sep 2020 01:00), Max: 98 (02 Sep 2020 16:00)  HR: 72 (03 Sep 2020 01:00) (65 - 104)  BP: 145/76 (03 Sep 2020 01:00) (145/76 - 217/113)  BP(mean): --  RR: 16 (03 Sep 2020 01:00) (16 - 20)  SpO2: 95% (03 Sep 2020 01:00) (93% - 99%)    General appearance: Well developed, well nourished.    Eyes: Pupils equal and reactive to light. EOM full. No exophthalmos.    Neck: Trachea midline. ?thyroid enlargement     Lungs: Normal respiratory excursion. Lungs clear.    CV: Regular cardiac rhythm. No murmur. Carotid and pedal pulses intact.      Musculoskeletal: No cyanosis, clubbing, or edema. No pedal lesions.    Skin: Warm and moist. No rash. No acanthosis.    Neuro: Cranial nerves intact. Normal motor and sensory function. DTR's normal.    Psych: Normal affect, good judgement.            LABS:                        15.9   12.90 )-----------( 295      ( 02 Sep 2020 17:03 )             46.0     09-02    143  |  100  |  13.0  ----------------------------<  180<H>  4.0   |  12.0<L>  |  1.33<H>    Ca    9.9      02 Sep 2020 17:03  Phos  4.1     09-02  Mg     2.4     09-02    TPro  8.4  /  Alb  4.8  /  TBili  0.4  /  DBili  x   /  AST  28  /  ALT  33  /  AlkPhos  70  09-02      LIVER FUNCTIONS - ( 02 Sep 2020 17:03 )  Alb: 4.8 g/dL / Pro: 8.4 g/dL / ALK PHOS: 70 U/L / ALT: 33 U/L / AST: 28 U/L / GGT: x                 CAPILLARY BLOOD GLUCOSE      RADIOLOGY & ADDITIONAL STUDIES:    < from: US Thyroid + Parathyroid (08.01.19 @ 11:17) >   EXAM:  US THYROID PARATHYROID                          PROCEDURE DATE:  08/01/2019          INTERPRETATION:  CLINICAL INFORMATION: Newly diagnosed pheochromocytoma.   Evaluate for medullary thyroid or parathyroid nodule    COMPARISON: None available.    TECHNIQUE: Sonography of the thyroid.     FINDINGS:    Right Lobe: 4.3 x 1.8 x 2.4 cm. Homogeneous in echogenicity. No nodules.    Left Lobe: 4.1 x 1.6 x 1.6 cm. Homogeneous in echogenicity. No nodules.    Isthmus: 7 mm.    No nodules are seen in the soft tissues adjacent to the thyroid to   suggest a parathyroid adenoma.    IMPRESSION:     Unremarkable thyroid gland.    No parathyroid adenoma identified; if there is continued concern for   parathyroid adenoma, a nuclear medicine sestamibi study could be obtained   for further evaluation.                    DANIEL NGUYEN   This document has been electronically signed. Aug  1 2019 12:56PM        < end of copied text >    Surgical Pathology Report:   ACCESSION No:  [id number]  RENU VALLECILLO                 2        Surgical Final Report          Final Diagnosis  1. Left adrenal gland, adrenalectomy:  -Adrenal gland, no tumor identified    2. Left retroperitoneal mass, biopsy:  -Matureganglion cells and Schwann cells  -See comment    Verified by: Willow Cannon MD  (Electronic Signature)  Reported on: 08/20/19 09:33 EDT, [address] [place] [id number]  _________________________________________________________________    Comment  Suggest correlation with clinical and imaging studies    Portions of case reviewed intradepartmentally    Clinical History  Left adrenal mass, pheochromocytoma    Specimen(s) Submitted  1     Left adrenal gland  2     Left retroperitoneal mass    Gross Description  1. The specimen is recieved fresh for intraoperative consultation  labeled "Left adrenal gland" and consists of an adrenal gland  with attached periadrenal adipose tissue, and several separate  pieces of soft tissue. The adrenal gland measures 6 x 3.3 x 1 cm,  weighing 15.7 g. The surface of the gland is unremarkable and has  been inked black. The attached fat is pale yellow tan and grossly  unremarkable. The adrenal gland is serially sectioned at 2-3 mm  intervals toreveal a bright orange yellow cortex, and brown tan  medulla, with no lesions grossly identified. The separate pieces  are focally hemorrhagic red and yellow tan and measure 2.2 x 1.5  x 0.2 cm in aggregate. No frozen section is performed. A gross  evaluation only is performed. The tissue is submitted entirely in  14 cassettes.    1A = separate pieces submitted entirely  1B-1N = serial sections of entire adrenal gland with entire  attached adipose tissue    INTRAOPERATIVE CONSULTATION:  1. Left adrenal gland -  Gross evaluation: No discrete lesion idenified            RENU VALLECILLO                 2        Surgical Final Report            By: Dr. ANTHONY Cannon    2. The specimen is recieved fresh for intraoperative consultation  labeled"Left retroperitoneal mass". It consists of 1 fragment of  focally hemorrhagic yellow tan soft to rubbery tissue, measuring  0.7 x 0.6 x 0.2 cm. The tissue is entirely frozen and submitted  entirely in one frozen section cassette.    INTRAOPERATIVE CONSULTATION:  2. Left retroperitoneal mass-  Frozen section: Neural tissue with ganglion cells    By: Dr. ANTHONY Cannon, 3:55 PM    KV 08/16/2019 12:49 (08.15.19 @ 15:06)

## 2020-09-02 NOTE — H&P ADULT - NSHPPHYSICALEXAM_GEN_ALL_CORE
Vital Signs Last 24 Hrs  T(C): 36.5 (02 Sep 2020 19:17), Max: 36.7 (02 Sep 2020 16:00)  T(F): 97.7 (02 Sep 2020 19:17), Max: 98 (02 Sep 2020 16:00)  HR: 87 (02 Sep 2020 18:57) (65 - 104)  BP: 217/113 (02 Sep 2020 18:57) (182/100 - 217/113)  RR: 18 (02 Sep 2020 18:57) (18 - 20)  SpO2: 99% (02 Sep 2020 18:57) (93% - 99%)    PHYSICAL EXAMINATION  General: The patient is awake & alert, NAD, obese   HEENT: NCAT, MMM, EOMI, PERRLA, +bite on lip, no tongue bite   Neck: Supple, no JVD  Pulm: CTA b/l, no wheezing, rales or rhonchi; respirations unlabored  Cardio: RRR, Normal +S1/S2, no m/g/r  GI:  BS +, Soft, obese, NT, ND, no rebound, guarding or rigidity  Extremities: No edema or cyanosis, +2 DP b/l  Derm: warm, dry, normal turgor, no rashes   MSK: Right shoulder: FROM on flexion and extension, pain w/ elevation above 90 degrees, +pain at glenohumeral joint. 5/5 strength left upper and b/l lower extremities   Neurologic: AAO X3, No focal deficits, CN II-XII grossly intact    Psych: Normal affect and mood

## 2020-09-02 NOTE — H&P ADULT - NSHPSOCIALHISTORY_GEN_ALL_CORE
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

## 2020-09-03 LAB
A1C WITH ESTIMATED AVERAGE GLUCOSE RESULT: 5.4 % — SIGNIFICANT CHANGE UP (ref 4–5.6)
ALBUMIN SERPL ELPH-MCNC: 3.9 G/DL — SIGNIFICANT CHANGE UP (ref 3.3–5.2)
ALDOST SERPL-MCNC: 8 NG/DL — SIGNIFICANT CHANGE UP
ALP SERPL-CCNC: 52 U/L — SIGNIFICANT CHANGE UP (ref 40–120)
ALT FLD-CCNC: 25 U/L — SIGNIFICANT CHANGE UP
AMPHET UR-MCNC: NEGATIVE — SIGNIFICANT CHANGE UP
ANION GAP SERPL CALC-SCNC: 13 MMOL/L — SIGNIFICANT CHANGE UP (ref 5–17)
AST SERPL-CCNC: 23 U/L — SIGNIFICANT CHANGE UP
BARBITURATES UR SCN-MCNC: NEGATIVE — SIGNIFICANT CHANGE UP
BASOPHILS # BLD AUTO: 0.06 K/UL — SIGNIFICANT CHANGE UP (ref 0–0.2)
BASOPHILS NFR BLD AUTO: 0.6 % — SIGNIFICANT CHANGE UP (ref 0–2)
BENZODIAZ UR-MCNC: NEGATIVE — SIGNIFICANT CHANGE UP
BILIRUB SERPL-MCNC: 0.6 MG/DL — SIGNIFICANT CHANGE UP (ref 0.4–2)
BUN SERPL-MCNC: 12 MG/DL — SIGNIFICANT CHANGE UP (ref 8–20)
CALCIUM SERPL-MCNC: 9 MG/DL — SIGNIFICANT CHANGE UP (ref 8.6–10.2)
CHLORIDE SERPL-SCNC: 105 MMOL/L — SIGNIFICANT CHANGE UP (ref 98–107)
CO2 SERPL-SCNC: 24 MMOL/L — SIGNIFICANT CHANGE UP (ref 22–29)
COCAINE METAB.OTHER UR-MCNC: NEGATIVE — SIGNIFICANT CHANGE UP
CORTIS AM PEAK SERPL-MCNC: 11 UG/DL — SIGNIFICANT CHANGE UP (ref 6–18.4)
CREAT SERPL-MCNC: 1.15 MG/DL — SIGNIFICANT CHANGE UP (ref 0.5–1.3)
EOSINOPHIL # BLD AUTO: 0.14 K/UL — SIGNIFICANT CHANGE UP (ref 0–0.5)
EOSINOPHIL NFR BLD AUTO: 1.4 % — SIGNIFICANT CHANGE UP (ref 0–6)
ESTIMATED AVERAGE GLUCOSE: 108 MG/DL — SIGNIFICANT CHANGE UP (ref 68–114)
GLUCOSE SERPL-MCNC: 109 MG/DL — HIGH (ref 70–99)
HCT VFR BLD CALC: 37.3 % — LOW (ref 39–50)
HGB BLD-MCNC: 13.8 G/DL — SIGNIFICANT CHANGE UP (ref 13–17)
IMM GRANULOCYTES NFR BLD AUTO: 0.3 % — SIGNIFICANT CHANGE UP (ref 0–1.5)
LYMPHOCYTES # BLD AUTO: 3.1 K/UL — SIGNIFICANT CHANGE UP (ref 1–3.3)
LYMPHOCYTES # BLD AUTO: 31.6 % — SIGNIFICANT CHANGE UP (ref 13–44)
MCHC RBC-ENTMCNC: 32.2 PG — SIGNIFICANT CHANGE UP (ref 27–34)
MCHC RBC-ENTMCNC: 37 GM/DL — HIGH (ref 32–36)
MCV RBC AUTO: 86.9 FL — SIGNIFICANT CHANGE UP (ref 80–100)
METHADONE UR-MCNC: NEGATIVE — SIGNIFICANT CHANGE UP
MONOCYTES # BLD AUTO: 0.91 K/UL — HIGH (ref 0–0.9)
MONOCYTES NFR BLD AUTO: 9.3 % — SIGNIFICANT CHANGE UP (ref 2–14)
NEUTROPHILS # BLD AUTO: 5.56 K/UL — SIGNIFICANT CHANGE UP (ref 1.8–7.4)
NEUTROPHILS NFR BLD AUTO: 56.8 % — SIGNIFICANT CHANGE UP (ref 43–77)
OPIATES UR-MCNC: POSITIVE
PCP SPEC-MCNC: SIGNIFICANT CHANGE UP
PCP UR-MCNC: NEGATIVE — SIGNIFICANT CHANGE UP
PLATELET # BLD AUTO: 206 K/UL — SIGNIFICANT CHANGE UP (ref 150–400)
POTASSIUM SERPL-MCNC: 3.2 MMOL/L — LOW (ref 3.5–5.3)
POTASSIUM SERPL-SCNC: 3.2 MMOL/L — LOW (ref 3.5–5.3)
PROT SERPL-MCNC: 6.4 G/DL — LOW (ref 6.6–8.7)
RBC # BLD: 4.29 M/UL — SIGNIFICANT CHANGE UP (ref 4.2–5.8)
RBC # FLD: 11.9 % — SIGNIFICANT CHANGE UP (ref 10.3–14.5)
SARS-COV-2 IGG SERPL QL IA: NEGATIVE — SIGNIFICANT CHANGE UP
SARS-COV-2 IGM SERPL IA-ACNC: <3.8 AU/ML — SIGNIFICANT CHANGE UP
SARS-COV-2 RNA SPEC QL NAA+PROBE: SIGNIFICANT CHANGE UP
SODIUM SERPL-SCNC: 141 MMOL/L — SIGNIFICANT CHANGE UP (ref 135–145)
THC UR QL: POSITIVE
WBC # BLD: 9.8 K/UL — SIGNIFICANT CHANGE UP (ref 3.8–10.5)
WBC # FLD AUTO: 9.8 K/UL — SIGNIFICANT CHANGE UP (ref 3.8–10.5)

## 2020-09-03 PROCEDURE — 99223 1ST HOSP IP/OBS HIGH 75: CPT

## 2020-09-03 PROCEDURE — 74176 CT ABD & PELVIS W/O CONTRAST: CPT | Mod: 26

## 2020-09-03 PROCEDURE — 95720 EEG PHY/QHP EA INCR W/VEEG: CPT

## 2020-09-03 PROCEDURE — 99233 SBSQ HOSP IP/OBS HIGH 50: CPT

## 2020-09-03 PROCEDURE — 70553 MRI BRAIN STEM W/O & W/DYE: CPT | Mod: 26

## 2020-09-03 PROCEDURE — 93306 TTE W/DOPPLER COMPLETE: CPT | Mod: 26

## 2020-09-03 RX ORDER — SPIRONOLACTONE 25 MG/1
25 TABLET, FILM COATED ORAL DAILY
Refills: 0 | Status: DISCONTINUED | OUTPATIENT
Start: 2020-09-03 | End: 2020-09-04

## 2020-09-03 RX ORDER — LEVETIRACETAM 250 MG/1
1000 TABLET, FILM COATED ORAL EVERY 12 HOURS
Refills: 0 | Status: DISCONTINUED | OUTPATIENT
Start: 2020-09-03 | End: 2020-09-04

## 2020-09-03 RX ORDER — LIDOCAINE 4 G/100G
1 CREAM TOPICAL DAILY
Refills: 0 | Status: DISCONTINUED | OUTPATIENT
Start: 2020-09-03 | End: 2020-09-04

## 2020-09-03 RX ORDER — HYDRALAZINE HCL 50 MG
10 TABLET ORAL EVERY 6 HOURS
Refills: 0 | Status: DISCONTINUED | OUTPATIENT
Start: 2020-09-03 | End: 2020-09-04

## 2020-09-03 RX ADMIN — MORPHINE SULFATE 2 MILLIGRAM(S): 50 CAPSULE, EXTENDED RELEASE ORAL at 00:15

## 2020-09-03 RX ADMIN — Medication 10 MILLIGRAM(S): at 16:47

## 2020-09-03 RX ADMIN — LEVETIRACETAM 400 MILLIGRAM(S): 250 TABLET, FILM COATED ORAL at 22:43

## 2020-09-03 RX ADMIN — TRAMADOL HYDROCHLORIDE 25 MILLIGRAM(S): 50 TABLET ORAL at 05:10

## 2020-09-03 RX ADMIN — HEPARIN SODIUM 5000 UNIT(S): 5000 INJECTION INTRAVENOUS; SUBCUTANEOUS at 05:10

## 2020-09-03 RX ADMIN — TRAMADOL HYDROCHLORIDE 25 MILLIGRAM(S): 50 TABLET ORAL at 05:55

## 2020-09-03 RX ADMIN — HEPARIN SODIUM 5000 UNIT(S): 5000 INJECTION INTRAVENOUS; SUBCUTANEOUS at 20:49

## 2020-09-03 RX ADMIN — Medication 10 MILLIGRAM(S): at 11:01

## 2020-09-03 RX ADMIN — Medication 650 MILLIGRAM(S): at 16:48

## 2020-09-03 RX ADMIN — Medication 650 MILLIGRAM(S): at 17:45

## 2020-09-03 RX ADMIN — MORPHINE SULFATE 2 MILLIGRAM(S): 50 CAPSULE, EXTENDED RELEASE ORAL at 00:00

## 2020-09-03 RX ADMIN — ATENOLOL 100 MILLIGRAM(S): 25 TABLET ORAL at 05:10

## 2020-09-03 RX ADMIN — HEPARIN SODIUM 5000 UNIT(S): 5000 INJECTION INTRAVENOUS; SUBCUTANEOUS at 13:07

## 2020-09-03 RX ADMIN — Medication 8 MILLIGRAM(S): at 20:48

## 2020-09-03 RX ADMIN — Medication 0.1 MILLIGRAM(S): at 20:50

## 2020-09-03 RX ADMIN — AMLODIPINE BESYLATE 10 MILLIGRAM(S): 2.5 TABLET ORAL at 05:10

## 2020-09-03 NOTE — CONSULT NOTE ADULT - SUBJECTIVE AND OBJECTIVE BOX
[facility]                                                                     Cecilia Hyatt MD                                              Epilepsy Consult #: [phone] ([phone])                                               Office: [address] [place] [address], [address]                                                 Phone: [phone]; Fax: [phone]                            ==============================================    EPILEPSY INITIAL CONSULT NOTE      ADMITTING DIAGNOSIS: Convulsions      HPI:  This is a 30y RH Male with a history of difficult-to-control HTN since 11yo, questionable thickening of the left adrenal gland up to 1.2cm concerning for pheochromocytoma s/p left adrenalectomy 8/15/19 at [facility], one prior bilateral tonic clonic seizure on 3/12/19 who presented with 3 bilateral tonic clonic seizures on DOA. All 4 seizures have occurred in SBP in 190s to 240s. Epilepsy consulted to evaluate for epilepsy vs acute provoked symptomatic seizure secondary to HTN emergency.      First seizure on 3/2/19, witnessed by patient's father. MRI and rEEG were negative. So patient discharged without any antiepileptic medication. Had two witnessed seizures at work few minutes a part, but patient might have returned back to his baseline mentation in between the seizures. Then during interview in ER, had another bilateral tonic clonic seizure.     Once hooked up to cvEEG, right temporal epileptiform discharges were seen. AED promptly started.      SEIZURE DESCRIPTION AND TYPE:  Type #1  Severity: focal to bilateral tonic clonic seizure   Onset: 3/12/19  Quality & associated signs/symptoms: no aura -> full body convulsion, tongue bite, urinary incontinence  Duration: few min  Timing:   Modifying factors: unknown trigger    Diurnal Variation: none    EPILEPSY TYPE: focal epilepsy, unknown etiology  HISTORY OF TONIC-CLONIC SZ: yes  HISTORY OF STATUS EPILEPTICUS: no      SEIZURE RISK FACTORS:  Patient was a product of normal pregnancy and delivery. No history of febrile seizure, TBI, CNS infection, or FH of seizures.    CURRENT AED:  levetiracetam 1500mg BID - started 9/4/20    PREVIOUS AED:  none    IMAGING:   MRI head w/wo, 9/3/20 ([facility]): Limited by motion artifact. Visualized hippocampal structures are symmetric in morphology and signal intensity.   MRI brain w/wo 3/13/19 ([facility]): Small right of midline posterior fossa arachnoid cyst.    NEUROPHYSIOLOGY:  cvEEG started 9/3/20 found to have right temporal epileptiform discharges.    NEUROPSYCHOLOGY:   none    PMH:  HTN (hypertension)    PSH:  left adrenalectomy 8/15/19    MEDICATION:  acetaminophen   Tablet .. 650 milliGRAM(s) Oral every 6 hours PRN Temp greater or equal to 38C (100.4F), Mild Pain (1 - 3)  amLODIPine   Tablet 10 milliGRAM(s) Oral daily  ATENolol  Tablet 100 milliGRAM(s) Oral daily  doxazosin 8 milliGRAM(s) Oral at bedtime  heparin   Injectable 5000 Unit(s) SubCutaneous every 8 hours  hydrALAZINE Injectable 10 milliGRAM(s) IV Push every 6 hours PRN SBP > 160  labetalol Injectable 10 milliGRAM(s) IV Push every 4 hours PRN SBP>180  levETIRAcetam  IVPB 1000 milliGRAM(s) IV Intermittent every 12 hours  lidocaine   Patch 1 Patch Transdermal daily  LORazepam   Injectable 2 milliGRAM(s) IV Push every 2 hours PRN seizure  spironolactone 25 milliGRAM(s) Oral daily    ALLERGIES:  No Known Allergies    FH:  Family history of brain aneurysm: mother  FH: HTN (hypertension)    SH:  Quit tobacco 1yr ago. Occasional MJ use. Denied illicit drugs. .    ROS:  Neurology as per HPI, otherwise negative for constitutional, skin, eyes, ENT, respiratory, cardiovascular, gastrointestinal, genitourinary, musculoskeletal, psychiatric, hematology/lymphatics, endocrine, allergic/immunologic.    VITALS:  T(C): 36.7 (09-03-20 @ 23:31), Max: 37.1 (09-03-20 @ 05:05)  HR: 70 (09-03-20 @ 23:31) (69 - 76)  BP: 174/79 (09-03-20 @ 23:31) (135/84 - 197/114)  ABP: --  RR: 16 (09-03-20 @ 23:31) (16 - 18)  SpO2: 93% (09-03-20 @ 23:31) (93% - 98%)  CVP(cm H2O): --    GENERAL PHYSICAL EXAM:  GEN: no distress  HEENT:  NCAT, OP clear  EYES: sclera white, conjunctiva clear, no nystagmus  NECK: supple  CV: RRR, no murmur     		  PULM: CTAB, no wheezing  ABD: soft, +BS, NT  EXT: peripheral pulse intact, no cyanosis  MSK: muscle tone and strength normal  SKIN: warm, dry, no rash or lesion on exposed skin    NEUROLOGICAL EXAM:  Mental Status  Orientation: alert and oriented to person, place, time, and situation   Language: clear and fluent, intact comprehension and repetition, intact naming and reading    Cranial Nerves  II: full visual fields intact   III, IV, VI: PERRL, EOMI  V, VII: facial sensation and movement intact and symmetric   VIII: hearing intact   IX, X: uvula midline, soft palate elevates normally   XI: BL shoulder shrug intact   XII: tongue midline    Motor  5/5 BUE and BLE                 Tone and bulk are normal in upper and lower limbs  No pronator drift    Sensation  Intact to light touch and pinprick in all 4 EXTs    Reflex  2+ in BL biceps and patella                                    Plantar responses downward bilaterally    Coordination  Normal FTN bilaterally    Gait  Deferred      LABS:                          13.8   9.80  )-----------( 206      ( 03 Sep 2020 04:16 )             37.3     09-03    141  |  105  |  12.0   ----------------------------<  109<H>  3.2<L>   |  24.0  |  1.15    Ca    9.0      03 Sep 2020 04:16  Phos  4.1     09-02  Mg     2.4     09-02    TPro  6.4<L>  /  Alb  3.9  /  TBili  0.6  /  DBili  x   /  AST  23  /  ALT  25  /  AlkPhos  52  09-03    CAPILLARY BLOOD GLUCOSE        LIVER FUNCTIONS - ( 03 Sep 2020 04:16 )  Alb: 3.9 g/dL / Pro: 6.4 g/dL / ALK PHOS: 52 U/L / ALT: 25 U/L / AST: 23 U/L / GGT: x           PT/INR - ( 02 Sep 2020 17:03 )   PT: 11.4 sec;   INR: 0.98 ratio         PTT - ( 02 Sep 2020 17:03 )  PTT:35.2 sec [facility]                                                                     Cecilia Hyatt MD                                              Epilepsy Consult #: [phone] ([phone])                                               Office: [address] [place] [address], [address]                                                 Phone: [phone]; Fax: [phone]                            ==============================================    EPILEPSY INITIAL CONSULT NOTE      ADMITTING DIAGNOSIS: Convulsions      HPI:  This is a 30y RH Male with a history of difficult-to-control HTN since 13yo, questionable thickening of the left adrenal gland up to 1.2cm concerning for pheochromocytoma s/p left adrenalectomy 8/15/19 at [facility], one prior bilateral tonic clonic seizure on 3/12/19 who presented with 3 bilateral tonic clonic seizures on DOA. All 4 seizures have occurred in SBP in 190s to 240s. Epilepsy consulted to evaluate for epilepsy vs acute provoked symptomatic seizure secondary to HTN emergency.      First seizure on 3/2/19, witnessed by patient's father. MRI and rEEG were negative. So patient discharged without any antiepileptic medication. Had two witnessed seizures at work few minutes a part, but patient might have returned back to his baseline mentation in between the seizures. Then during interview in ER, had another bilateral tonic clonic seizure.     Once hooked up to cvEEG, right temporal epileptiform discharges were seen. AED promptly started.      SEIZURE DESCRIPTION AND TYPE:  Type #1  Severity: focal to bilateral tonic clonic seizure   Onset: 3/12/19  Quality & associated signs/symptoms: no aura -> full body convulsion, tongue bite, urinary incontinence  Duration: few min  Timing: one on 3/12/19, three on 9/3/20  Modifying factors: unknown trigger    Diurnal Variation: none    EPILEPSY TYPE: focal epilepsy, unknown etiology  HISTORY OF TONIC-CLONIC SZ: yes  HISTORY OF STATUS EPILEPTICUS: no      SEIZURE RISK FACTORS:  Patient was a product of normal pregnancy and delivery. No history of febrile seizure, TBI, CNS infection, or FH of seizures.    CURRENT AED:  levetiracetam 1500mg BID - started 9/4/20    PREVIOUS AED:  none    IMAGING:   MRI head w/wo, 9/3/20 ([facility]): Limited by motion artifact. Visualized hippocampal structures are symmetric in morphology and signal intensity.   MRI brain w/wo 3/13/19 ([facility]): Small right of midline posterior fossa arachnoid cyst.    NEUROPHYSIOLOGY:  cvEEG started 9/3/20 found to have right temporal epileptiform discharges.    NEUROPSYCHOLOGY:   none    PMH:  HTN (hypertension)    PSH:  left adrenalectomy 8/15/19    MEDICATION:  acetaminophen   Tablet .. 650 milliGRAM(s) Oral every 6 hours PRN Temp greater or equal to 38C (100.4F), Mild Pain (1 - 3)  amLODIPine   Tablet 10 milliGRAM(s) Oral daily  ATENolol  Tablet 100 milliGRAM(s) Oral daily  doxazosin 8 milliGRAM(s) Oral at bedtime  heparin   Injectable 5000 Unit(s) SubCutaneous every 8 hours  hydrALAZINE Injectable 10 milliGRAM(s) IV Push every 6 hours PRN SBP > 160  labetalol Injectable 10 milliGRAM(s) IV Push every 4 hours PRN SBP>180  levETIRAcetam  IVPB 1000 milliGRAM(s) IV Intermittent every 12 hours  lidocaine   Patch 1 Patch Transdermal daily  LORazepam   Injectable 2 milliGRAM(s) IV Push every 2 hours PRN seizure  spironolactone 25 milliGRAM(s) Oral daily    ALLERGIES:  No Known Allergies    FH:  Family history of brain aneurysm: mother  FH: HTN (hypertension)    SH:  Quit tobacco 1yr ago. Occasional MJ use. Denied illicit drugs. .    ROS:  Neurology as per HPI, otherwise negative for constitutional, skin, eyes, ENT, respiratory, cardiovascular, gastrointestinal, genitourinary, musculoskeletal, psychiatric, hematology/lymphatics, endocrine, allergic/immunologic.    VITALS:  T(C): 36.7 (09-03-20 @ 23:31), Max: 37.1 (09-03-20 @ 05:05)  HR: 70 (09-03-20 @ 23:31) (69 - 76)  BP: 174/79 (09-03-20 @ 23:31) (135/84 - 197/114)  ABP: --  RR: 16 (09-03-20 @ 23:31) (16 - 18)  SpO2: 93% (09-03-20 @ 23:31) (93% - 98%)  CVP(cm H2O): --    GENERAL PHYSICAL EXAM:  GEN: no distress  HEENT:  NCAT, OP clear  EYES: sclera white, conjunctiva clear, no nystagmus  NECK: supple  CV: RRR, no murmur     		  PULM: CTAB, no wheezing  ABD: soft, +BS, NT  EXT: peripheral pulse intact, no cyanosis  MSK: muscle tone and strength normal  SKIN: warm, dry, no rash or lesion on exposed skin    NEUROLOGICAL EXAM:  Mental Status  Orientation: alert and oriented to person, place, time, and situation   Language: clear and fluent, intact comprehension and repetition, intact naming and reading    Cranial Nerves  II: full visual fields intact   III, IV, VI: PERRL, EOMI  V, VII: facial sensation and movement intact and symmetric   VIII: hearing intact   IX, X: uvula midline, soft palate elevates normally   XI: BL shoulder shrug intact   XII: tongue midline    Motor  5/5 BUE and BLE                 Tone and bulk are normal in upper and lower limbs  No pronator drift    Sensation  Intact to light touch and pinprick in all 4 EXTs    Reflex  2+ in BL biceps and patella                                    Plantar responses downward bilaterally    Coordination  Normal FTN bilaterally    Gait  Deferred      LABS:                          13.8   9.80  )-----------( 206      ( 03 Sep 2020 04:16 )             37.3     09-03    141  |  105  |  12.0   ----------------------------<  109<H>  3.2<L>   |  24.0  |  1.15    Ca    9.0      03 Sep 2020 04:16  Phos  4.1     09-02  Mg     2.4     09-02    TPro  6.4<L>  /  Alb  3.9  /  TBili  0.6  /  DBili  x   /  AST  23  /  ALT  25  /  AlkPhos  52  09-03    CAPILLARY BLOOD GLUCOSE        LIVER FUNCTIONS - ( 03 Sep 2020 04:16 )  Alb: 3.9 g/dL / Pro: 6.4 g/dL / ALK PHOS: 52 U/L / ALT: 25 U/L / AST: 23 U/L / GGT: x           PT/INR - ( 02 Sep 2020 17:03 )   PT: 11.4 sec;   INR: 0.98 ratio         PTT - ( 02 Sep 2020 17:03 )  PTT:35.2 sec

## 2020-09-03 NOTE — PROGRESS NOTE ADULT - ASSESSMENT
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     Recurrent labile HTN urgency unclear if pheochromocytoma/paraganglioma as resection of L-adrenal gland last year did not reveal tumor presence vs. chronic HTN    1. HTN urgency- increased atenolol to 100mg, continue doxazosin 8mg BID and amlodipine 10mg; repeat serum metanephrines/catecholamines, renin/dasia level sent; per nuclear medicine MIBG scan would need pretreatment for 3 days prior to scanning, pending endocrine evaluation. Given hypokalemia would defer use of thiazide diuretic, can add spironolactone 25mg to keep BP <130/80. Advised to consider elective bariatric surgery.     2. Syncope- in setting of HTN urgency? recurrent seizure?- CT head negative; await repeat TTE and no event on telemetry monitoring currently; consider neurology evaluation.     3. Anion-gap acidosis- resolved.         Manny Haro DO, [facility]  Faculty Non-Invasive Cardiologist  [phone]

## 2020-09-03 NOTE — PROGRESS NOTE ADULT - ASSESSMENT
HTN emergency  -to redo hormoanl work up as out pt with 24 hr urine studies   needs to follow low indole diet 1 week prior to urine colleciton  and until compeleted   palsma met pending   Pt  has been on labetalol which affects MIBG scan   MIBG scan to be done as outpt as pt needs prep

## 2020-09-03 NOTE — PROGRESS NOTE ADULT - ASSESSMENT
29 YO M w/ PMHx of HTN and multiple seizure episodes in the setting of HTN emergency w/ concerns for pheochromocytoma [s/p left adrenalectomy on 8/15 at [facility]] coming to hospital post seizure w/ elevated blood pressure     #Seizure episode   - may be 2/2 htn vs dehydration  - ativan prn   - check mr head w/ and w/o contrast r/o press  - eeg  - neuro consult appreciated   - utox positive for marijuana and opoids     #HTN- urgeny  - monitor blood pressure  - atenolol, doxazosin, amlodipine   - start spinolactone   - labetolol prn  - cardio consult appreciated   - in patient w/ ?hx of pheochromocytoma s/p surgery however pathology negative for pheo  - 24 hour urine metanephrine/catecholamine ongoing   - cortisol wnl  - endo consult appreciate  - depending on clinical course patient may need MIBG scan- if patient stable would do outaptient     #morbid obesity  - weight loss counselled  - bariatric surgery follow up outpatient Dr Perez     #opoid and marijauna positive utox  - cessation advised   - istop [id number] no medications seen     #DVT Prophylaxis  - heparin SC 29 YO M w/ PMHx of HTN and multiple seizure episodes in the setting of HTN emergency w/ concerns for pheochromocytoma [s/p left adrenalectomy on 8/15 at [facility]] coming to hospital post seizure w/ elevated blood pressure     #Seizure episode   - may be 2/2 htn vs dehydration  - ativan prn   - check mr head w/ and w/o contrast r/o press  - eeg  - neuro consult appreciated   - utox positive for marijuana and opoids     #HTN- urgeny  - monitor blood pressure  - atenolol, doxazosin, amlodipine   - start spinolactone   - labetolol prn  - cardio consult appreciated   - in patient w/ ?hx of pheochromocytoma s/p surgery however pathology negative for pheo  - 24 hour urine metanephrine/catecholamine ongoing   - cortisol wnl  - endo consult appreciate  - depending on clinical course patient may need MIBG scan- if patient stable would do outaptient     #morbid obesity  - weight loss counselled  - bariatric surgery follow up outpatient Dr Perez     #opoid and marijuana positive utox  - cessation advised   - istop [id number] no medications seen     #shoulder pain   - lidocaine patch  - mr pending     #DVT Prophylaxis  - heparin SC

## 2020-09-03 NOTE — CONSULT NOTE ADULT - ASSESSMENT
30y RH Male with a history of difficult-to-control HTN since 13yo, questionable thickening of the left adrenal gland up to 1.2cm concerning for pheochromocytoma s/p left adrenalectomy 8/15/19 at [facility], and a total of 4 lifetime bilateral tonic clonic seizures (3/12/19 x1, 9/3/20 x3). Continuous video EEG finds right temporal epileptiform discharges. Negative MRI brain x2.    Impression:  1. Newly diagnosed focal epilepsy characterized by focal to bilateral tonic clonic seizures.  2. HTN  3. ?pheochromocytoma s/p left adrenalectomy      Recommendation:  - cvEEG   - continue levetiracetam 1500mg BID (use IV for at least 2 doses, then convert to PO)  - lorazepam 2mg IV prn convulsive seizure  - seizure precaution  - no driving  - workup for questionable pheo per endo  - follow-up with me in clinic: [facility] [place] ([phone]), [facility] [place] [address]       Thank you for allowing Epilepsy to participate in the care of this patient.   ______________________  Cecilia Hyatt MD   [facility]  Cell: [phone]  Epilepsy Consult #: [id number] ([phone])
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     Recurrent labile HTN urgency unclear if pheochromocytoma/paraganglioma as resection of L-adrenal gland last year did not reveal tumor presence.     1. HTN urgency- increase atenolol to 100mg, continue doxazosin 8mg BID and amlodipine 10mg; recheck serum metanephrines/catecholamines, renin/dasia level and obtain MIBG scan per endocrinology. Consider addition of chlorthalidone to keep BP <130/80.     2. Syncope- in setting of HTN urgency? recurrent seizure?- CT head negative; repeat TTE and telemetry monitoring; consider neurology evaluation.     3. Anion-gap acidosis- repeat BMP and check lactic acid and urinalysis.         Manny Haro DO, [facility]  Faculty Non-Invasive Cardiologist  [phone]
Pt with  epsiodes of seizure  HTN emergency    Pt awake and alert now   BP improving  pt states he ahs been compliant with his regimen     ? paraganglioma   will try to get MIBG scan but usually need  prep  for several days with [facility]-    check CT Scan of abdomne once  more stable    plasma metanephrines ordered by cardiolohgy         will order 24 hr urien metanephrine catecholamine and cortisol       Neurology eval for seizures

## 2020-09-04 ENCOUNTER — TRANSCRIPTION ENCOUNTER (OUTPATIENT)
Age: 30
End: 2020-09-04

## 2020-09-04 VITALS
DIASTOLIC BLOOD PRESSURE: 88 MMHG | SYSTOLIC BLOOD PRESSURE: 137 MMHG | OXYGEN SATURATION: 99 % | RESPIRATION RATE: 18 BRPM | TEMPERATURE: 98 F | HEART RATE: 74 BPM

## 2020-09-04 LAB — ACTH SER-ACNC: 22.6 PG/ML — SIGNIFICANT CHANGE UP (ref 7.2–63.3)

## 2020-09-04 PROCEDURE — 80053 COMPREHEN METABOLIC PANEL: CPT

## 2020-09-04 PROCEDURE — 93306 TTE W/DOPPLER COMPLETE: CPT

## 2020-09-04 PROCEDURE — 82024 ASSAY OF ACTH: CPT

## 2020-09-04 PROCEDURE — 73030 X-RAY EXAM OF SHOULDER: CPT

## 2020-09-04 PROCEDURE — 85730 THROMBOPLASTIN TIME PARTIAL: CPT

## 2020-09-04 PROCEDURE — 96372 THER/PROPH/DIAG INJ SC/IM: CPT | Mod: XU

## 2020-09-04 PROCEDURE — 85027 COMPLETE CBC AUTOMATED: CPT

## 2020-09-04 PROCEDURE — 82088 ASSAY OF ALDOSTERONE: CPT

## 2020-09-04 PROCEDURE — 82962 GLUCOSE BLOOD TEST: CPT

## 2020-09-04 PROCEDURE — U0003: CPT

## 2020-09-04 PROCEDURE — 80307 DRUG TEST PRSMV CHEM ANLYZR: CPT

## 2020-09-04 PROCEDURE — 93005 ELECTROCARDIOGRAM TRACING: CPT

## 2020-09-04 PROCEDURE — 84484 ASSAY OF TROPONIN QUANT: CPT

## 2020-09-04 PROCEDURE — 83835 ASSAY OF METANEPHRINES: CPT

## 2020-09-04 PROCEDURE — 74176 CT ABD & PELVIS W/O CONTRAST: CPT

## 2020-09-04 PROCEDURE — 99291 CRITICAL CARE FIRST HOUR: CPT | Mod: 25

## 2020-09-04 PROCEDURE — 83036 HEMOGLOBIN GLYCOSYLATED A1C: CPT

## 2020-09-04 PROCEDURE — 70450 CT HEAD/BRAIN W/O DYE: CPT

## 2020-09-04 PROCEDURE — 99233 SBSQ HOSP IP/OBS HIGH 50: CPT

## 2020-09-04 PROCEDURE — 95700 EEG CONT REC W/VID EEG TECH: CPT

## 2020-09-04 PROCEDURE — 86769 SARS-COV-2 COVID-19 ANTIBODY: CPT

## 2020-09-04 PROCEDURE — 83735 ASSAY OF MAGNESIUM: CPT

## 2020-09-04 PROCEDURE — 84244 ASSAY OF RENIN: CPT

## 2020-09-04 PROCEDURE — 82533 TOTAL CORTISOL: CPT

## 2020-09-04 PROCEDURE — 96374 THER/PROPH/DIAG INJ IV PUSH: CPT

## 2020-09-04 PROCEDURE — 36415 COLL VENOUS BLD VENIPUNCTURE: CPT

## 2020-09-04 PROCEDURE — 95714 VEEG EA 12-26 HR UNMNTR: CPT

## 2020-09-04 PROCEDURE — 70553 MRI BRAIN STEM W/O & W/DYE: CPT

## 2020-09-04 PROCEDURE — 82384 ASSAY THREE CATECHOLAMINES: CPT

## 2020-09-04 PROCEDURE — 96375 TX/PRO/DX INJ NEW DRUG ADDON: CPT

## 2020-09-04 PROCEDURE — 99239 HOSP IP/OBS DSCHRG MGMT >30: CPT

## 2020-09-04 PROCEDURE — 84100 ASSAY OF PHOSPHORUS: CPT

## 2020-09-04 PROCEDURE — 85610 PROTHROMBIN TIME: CPT

## 2020-09-04 RX ORDER — LEVETIRACETAM 250 MG/1
2 TABLET, FILM COATED ORAL
Qty: 0 | Refills: 0 | DISCHARGE
Start: 2020-09-04

## 2020-09-04 RX ORDER — LEVETIRACETAM 250 MG/1
1500 TABLET, FILM COATED ORAL
Refills: 0 | Status: DISCONTINUED | OUTPATIENT
Start: 2020-09-04 | End: 2020-09-04

## 2020-09-04 RX ORDER — SPIRONOLACTONE 25 MG/1
1 TABLET, FILM COATED ORAL
Qty: 30 | Refills: 0
Start: 2020-09-04 | End: 2020-10-03

## 2020-09-04 RX ORDER — ATENOLOL 25 MG/1
1 TABLET ORAL
Qty: 30 | Refills: 0
Start: 2020-09-04 | End: 2020-10-03

## 2020-09-04 RX ORDER — DOXAZOSIN MESYLATE 4 MG
1 TABLET ORAL
Qty: 30 | Refills: 0
Start: 2020-09-04 | End: 2020-10-03

## 2020-09-04 RX ORDER — DOXAZOSIN MESYLATE 4 MG
1 TABLET ORAL
Qty: 0 | Refills: 0 | DISCHARGE

## 2020-09-04 RX ORDER — LEVETIRACETAM 250 MG/1
1500 TABLET, FILM COATED ORAL EVERY 12 HOURS
Refills: 0 | Status: DISCONTINUED | OUTPATIENT
Start: 2020-09-04 | End: 2020-09-04

## 2020-09-04 RX ORDER — AMLODIPINE BESYLATE 2.5 MG/1
1 TABLET ORAL
Qty: 30 | Refills: 0
Start: 2020-09-04 | End: 2020-10-03

## 2020-09-04 RX ADMIN — ATENOLOL 100 MILLIGRAM(S): 25 TABLET ORAL at 05:23

## 2020-09-04 RX ADMIN — SPIRONOLACTONE 25 MILLIGRAM(S): 25 TABLET, FILM COATED ORAL at 05:23

## 2020-09-04 RX ADMIN — AMLODIPINE BESYLATE 10 MILLIGRAM(S): 2.5 TABLET ORAL at 05:23

## 2020-09-04 RX ADMIN — LEVETIRACETAM 400 MILLIGRAM(S): 250 TABLET, FILM COATED ORAL at 05:23

## 2020-09-04 RX ADMIN — HEPARIN SODIUM 5000 UNIT(S): 5000 INJECTION INTRAVENOUS; SUBCUTANEOUS at 05:23

## 2020-09-04 RX ADMIN — Medication 10 MILLIGRAM(S): at 00:27

## 2020-09-04 NOTE — PROGRESS NOTE ADULT - ASSESSMENT
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     Recurrent labile HTN urgency unclear if pheochromocytoma/paraganglioma as resection of L-adrenal gland last year did not reveal tumor presence vs. chronic HTN due to morbid obesity/undiagnosed GERALDINE?    1. HTN urgency- increased atenolol to 100mg, continue doxazosin 8mg and amlodipine 10mg; added spironolactone 25mg, can also add losartan 100mg to keep BP <130/80 as with moderate LVH on TTE with end-organ damage. Repeat serum metanephrines/catecholamines pending but may need repeat as received IV labetalol on admission, serum Aldosterone level not consistent with hyperaldosteronism; per nuclear medicine MIBG scan would need pretreatment for 3 days prior to scanning, evaluated by endocrine can obtain MIBG scan as outpateint. Given hypokalemia would defer use of thiazide diuretic. Advised to consider elective bariatric surgery and sleep apnea testing for GERALDINE.    2. Syncope- in setting of HTN urgency? recurrent seizure?- CT head and MRI negative; started on Keppra for seizure on EEG. TTE with midcavity obstruction from LVH, but findings and EKG not suggestive of HOCM.        Patient can follow up in me as outpatient for BP control.         Manny Haro DO, [facility]  Faculty Non-Invasive Cardiologist  [phone]

## 2020-09-04 NOTE — DISCHARGE NOTE PROVIDER - CARE PROVIDERS DIRECT ADDRESSES
[email or web address]

## 2020-09-04 NOTE — PROGRESS NOTE ADULT - ASSESSMENT
30y RH Male with a history of difficult-to-control HTN since 13yo, questionable thickening of the left adrenal gland up to 1.2cm concerning for pheochromocytoma s/p left adrenalectomy 8/15/19 at [facility], and a total of 4 lifetime bilateral tonic clonic seizures (3/12/19 x1, 9/3/20 x3). Continuous video EEG finds right temporal epileptiform discharges. Negative MRI brain x2.    Impression:  1. Newly diagnosed focal epilepsy characterized by focal to bilateral tonic clonic seizures.  2. HTN  3. ?pheochromocytoma s/p left adrenalectomy      Recommendation:  - DC cvEEG   - continue levetiracetam 1500mg BID (script already sent to [facility])   - lorazepam 2mg IV prn convulsive seizure  - seizure precaution  - no driving  - workup for questionable pheo per endo  - pending discharge today  - follow-up with me in clinic: [facility] [place] ([phone]), [place] [address]       Please contact Epilepsy with further question if needed.   ______________________  Cecilia Hyatt MD   [facility]  Cell: [phone]  Epilepsy Consult #: [phone] ([phone])

## 2020-09-04 NOTE — DISCHARGE NOTE PROVIDER - PROVIDER TOKENS
PROVIDER:[TOKEN:[[id number]]],PROVIDER:[TOKEN:[[id number]]],PROVIDER:[TOKEN:[[id number]]],PROVIDER:[TOKEN:[[id number]]]

## 2020-09-04 NOTE — DISCHARGE NOTE PROVIDER - CARE PROVIDER_API CALL
Zoe Gutierrez (DO)  EndocrinologyMetabDiabetes; Internal Medicine  [address] [place] [address]  Phone: [phone]  Fax: [phone]  Follow Up Time:     Cecilia Hyatt)  Neurology  [address] [place] [address]  Phone: [phone]  Fax: [phone]  Follow Up Time:     Manny Haro (DO)  Cardiology; Internal Medicine  [address] [facility], [address]  [place] [address]  Phone: [phone]  Fax: [phone]  Follow Up Time:     Josh Perez  SURGERY  [address], [place] [id number]  Phone: [phone]  Fax: [phone]  Follow Up Time:

## 2020-09-04 NOTE — PROGRESS NOTE ADULT - ASSESSMENT
31 YO M w/ PMHx of HTN and multiple seizure episodes in the setting of HTN emergency w/ concerns for pheochromocytoma [s/p left adrenalectomy on 8/15 at [facility]] coming to hospital post seizure w/ elevated blood pressure     #Seizure episode  - 2/2 epilepsy  - keppra  - eeg positive for seizure   - neuro consult appreciated   - utox positive for marijuana and opoids     #HTN- essential   - monitor blood pressure  - atenolol, doxazosin, amlodipine, spironolactone  - cardio consult appreciated   - in patient w/ ?hx of pheochromocytoma s/p surgery however pathology negative for pheo  - 24 hour urine metanephrine/catecholamine- will need to follow diet x 7 days prior to test will be done outpatient with endo -> diet instructions and rx for test given to patient (printed from Endo order from [facility])   - cortisol wnl  - endo consult appreciated  - MIBG scan outpatient as warranted via endo    #morbid obesity  - weight loss counselled  - bariatric surgery follow up outpatient Dr Perez     #opoid and marijuana positive utox  - cessation advised   - istop [id number] no medications seen     #shoulder pain - resolved   - lidocaine patch    #DVT Prophylaxis  - heparin SC

## 2020-09-04 NOTE — CHART NOTE - NSCHARTNOTEFT_GEN_A_CORE
Called by RN for pt's BP of 174/79 P 70 after a dose of 0.1mg clonidine for /102 P 76.  Pt is asymptomatic.  Reviewed meds, Rn was informed to give ordered prn hydralazine.  RN to monitor for BP improvement and escalate prn.

## 2020-09-04 NOTE — DISCHARGE NOTE PROVIDER - NSDCCPCAREPLAN_GEN_ALL_CORE_FT
PRINCIPAL DISCHARGE DIAGNOSIS  Diagnosis: Seizure  Assessment and Plan of Treatment: - take mediactions as rx  - follow up with neurology  - DO NOT DRIVE OR OPERATE MACHINERY UNTIL CLEARED BY NEUROLOGY      SECONDARY DISCHARGE DIAGNOSES  Diagnosis: Obesity  Assessment and Plan of Treatment: - exercise   - follow up with bariatric surgery    Diagnosis: Hypertensive urgency  Assessment and Plan of Treatment: - take medications as rx  - follow up with cardiology and endocrinology  - 24 hour urine after following recommended diet given to you ax 7 days   - mibg scan per endocrinology

## 2020-09-04 NOTE — EEG REPORT - NS EEG TEXT BOX
[facility]   COMPREHENSIVE EPILEPSY CENTER   REPORT OF LONG-TERM VIDEO EEG     [facility]: [address], [place] [address], Ph#: [phone]  [place]: [phone] [place] [address], Ph#: [phone]  [facility]: [address] [place] [address], Ph#: [phone]    Patient Name: RENU VALLECILLO  Age and : 30y ([birth year])  MRN #: [id number]  Location: [facility] 6WR [id number] 02  Referring Physician: Michael Hinton    Start Time/Date: 19:40 on 20  End Time/Date: 08:00 on 20  Duration: 08hr 08m    _____________________________________________________________  STUDY INFORMATION    EEG Recording Technique:  The patient underwent continuous Video-EEG monitoring, using Telemetry System hardware on the XLTek Digital System. EEG and video data were stored on a computer hard drive with important events saved in digital archive files. The material was reviewed by a physician (electroencephalographer / epileptologist) on a daily basis. Kirit and seizure detection algorithms were utilized and reviewed. An EEG Technician attended to the patient, and was available throughout daytime work hours.  The epilepsy center neurologist was available in person or on call 24-hours per day.    EEG Placement and Labeling of Electrodes:  The EEG was performed utilizing 20 channel referential EEG connections (coronal over temporal over parasagittal montage) using all standard 10-20 electrode placements with EKG, with additional electrodes placed in the inferior temporal region using the modified 10-10 montage electrode placements for elective admissions, or if deemed necessary. Recording was at a sampling rate of 256 samples per second per channel. Time synchronized digital video recording was done simultaneously with EEG recording. A low light infrared camera was used for low light recording.     _____________________________________________________________  HISTORY    Patient is a 30y old  Male who presents with a chief complaint of seizure, Hypertensive emergency (03 Sep 2020 20:10)      PERTINENT MEDICATION:  levETIRAcetam  IVPB 1500 milliGRAM(s) IV Intermittent every 12 hours    _____________________________________________________________  STUDY INTERPRETATION    Findings: The background was continuous, spontaneously variable and reactive. No posterior dominant rhythm seen.    Background Slowing:  No generalized background slowing was present.    Focal Slowing:   Intermittent theta/delta slowing in the right temporal (max F8/T8/F10/P10) region, with field extending to the right frontal region.     Sleep Background:  Drowsiness was characterized by fragmentation, attenuation, and slowing of the background activity.    Sleep was characterized by the presence of vertex waves, symmetric sleep spindles and K-complexes.    Other Non-Epileptiform Findings:  None were present.    Interictal Epileptiform Activity:   Frequent right temporal (max F8/T8/F10/P10) sharp wave discharges in sleep.    Events:  Clinical events: None recorded.  Seizures: None recorded.    Activation Procedures:   Hyperventilation was not performed.    Photic stimulation was not performed.     Artifacts:  Intermittent myogenic and movement artifacts were noted.    ECG:  The heart rate on single channel ECG was predominantly between 70-80 BPM.    _____________________________________________________________  EEG SUMMARY/CLASSIFICATION    Abnormal EEG in the awake, drowsy and asleep states.  - Frequent right temporal (max F8/T8/F10/P10) sharp wave discharges in sleep.  - Intermittent theta/delta slowing in the right temporal (max F8/T8/F10/P10) region, with field extending to the right frontal region.     _____________________________________________________________  EEG IMPRESSION/CLINICAL CORRELATE    Abnormal EEG study.  1. Potential epileptogenic focus and functional abnormality in the right temporal region.   2. No seizure seen.    _____________________________________________________________    Cecilia Hyatt MD  Attending Physician, [facility]   COMPREHENSIVE EPILEPSY CENTER   REPORT OF LONG-TERM VIDEO EEG     [facility]: [address] , [address], [place] [address], Ph#: [phone]  [place]: [phone] [place] [address], Ph#: [phone]  [facility]: [address] [place] [address], Ph#: [phone]    Patient Name: RENU VALLECILLO  Age and : 30y (89)  MRN #: [id number]  Location: [facility] 6WR 6228 02  Referring Physician: Michael Hinton    Start Time/Date: 19:40 on 20  End Time/Date: 08:00 on 20  Duration: 12hr 20m    _____________________________________________________________  STUDY INFORMATION    EEG Recording Technique:  The patient underwent continuous Video-EEG monitoring, using Telemetry System hardware on the XLTek Digital System. EEG and video data were stored on a computer hard drive with important events saved in digital archive files. The material was reviewed by a physician (electroencephalographer / epileptologist) on a daily basis. Kirit and seizure detection algorithms were utilized and reviewed. An EEG Technician attended to the patient, and was available throughout daytime work hours.  The epilepsy center neurologist was available in person or on call 24-hours per day.    EEG Placement and Labeling of Electrodes:  The EEG was performed utilizing 20 channel referential EEG connections (coronal over temporal over parasagittal montage) using all standard 10-20 electrode placements with EKG, with additional electrodes placed in the inferior temporal region using the modified 10-10 montage electrode placements for elective admissions, or if deemed necessary. Recording was at a sampling rate of 256 samples per second per channel. Time synchronized digital video recording was done simultaneously with EEG recording. A low light infrared camera was used for low light recording.     _____________________________________________________________  HISTORY    Patient is a 30y old  Male who presents with a chief complaint of seizure, Hypertensive emergency (03 Sep 2020 20:10)      PERTINENT MEDICATION:  levETIRAcetam  IVPB 1500 milliGRAM(s) IV Intermittent every 12 hours    _____________________________________________________________  STUDY INTERPRETATION    Findings: The background was continuous, spontaneously variable and reactive. No posterior dominant rhythm seen.    Background Slowing:  No generalized background slowing was present.    Focal Slowing:   Intermittent theta/delta slowing in the right temporal (max F8/T8/F10/P10) region, with field extending to the right frontal region.     Sleep Background:  Drowsiness was characterized by fragmentation, attenuation, and slowing of the background activity.    Sleep was characterized by the presence of vertex waves, symmetric sleep spindles and K-complexes.    Other Non-Epileptiform Findings:  None were present.    Interictal Epileptiform Activity:   Frequent right temporal (max F8/T8/F10/P10) sharp wave discharges in sleep.    Events:  Clinical events: None recorded.  Seizures: None recorded.    Activation Procedures:   Hyperventilation was not performed.    Photic stimulation was not performed.     Artifacts:  Intermittent myogenic and movement artifacts were noted.    ECG:  The heart rate on single channel ECG was predominantly between 70-80 BPM.    _____________________________________________________________  EEG SUMMARY/CLASSIFICATION    Abnormal EEG in the awake, drowsy and asleep states.  - Frequent right temporal (max F8/T8/F10/P10) sharp wave discharges in sleep.  - Intermittent theta/delta slowing in the right temporal (max F8/T8/F10/P10) region, with field extending to the right frontal region.     _____________________________________________________________  EEG IMPRESSION/CLINICAL CORRELATE    Abnormal EEG study.  1. Potential epileptogenic focus and functional abnormality in the right temporal region.   2. No seizure seen.    _____________________________________________________________    Cecilia Hyatt MD  Attending Physician, [facility]

## 2020-09-04 NOTE — DISCHARGE NOTE PROVIDER - NSDCMRMEDTOKEN_GEN_ALL_CORE_FT
acetaminophen 325 mg oral tablet: 2 tab(s) orally every 6 hours, As needed, Mild Pain (1 - 3)  amLODIPine 10 mg oral tablet: 1 tab(s) orally once a day   atenolol 100 mg oral tablet: 1 tab(s) orally once a day  doxazosin 8 mg oral tablet: 1 tab(s) orally once a day (at bedtime)  levETIRAcetam 750 mg oral tablet: 2 tab(s) orally 2 times a day  spironolactone 25 mg oral tablet: 1 tab(s) orally once a day

## 2020-09-04 NOTE — PROGRESS NOTE ADULT - SUBJECTIVE AND OBJECTIVE BOX
[facility]                                                                     Cecilia Hyatt MD                                              Epilepsy Consult #: [id number] ([phone])                                               Office: [address] [place] [address], [address]                                                 Phone: [phone]; Fax: [phone]                            ==============================================    EPILEPSY FOLLOW-UP NOTE      INTERVAL HISTORY:  Continuous video EEG with right temporal epileptiform discharges. Tolerating levetiracetam well.      MEDICATIONS:   acetaminophen   Tablet .. 650 milliGRAM(s) Oral every 6 hours PRN Temp greater or equal to 38C (100.4F), Mild Pain (1 - 3)  amLODIPine   Tablet 10 milliGRAM(s) Oral daily  ATENolol  Tablet 100 milliGRAM(s) Oral daily  doxazosin 8 milliGRAM(s) Oral at bedtime  heparin   Injectable 5000 Unit(s) SubCutaneous every 8 hours  hydrALAZINE Injectable 10 milliGRAM(s) IV Push every 6 hours PRN SBP > 160  labetalol Injectable 10 milliGRAM(s) IV Push every 4 hours PRN SBP>180  levETIRAcetam  IVPB 1500 milliGRAM(s) IV Intermittent every 12 hours  lidocaine   Patch 1 Patch Transdermal daily  LORazepam   Injectable 2 milliGRAM(s) IV Push every 2 hours PRN seizure  spironolactone 25 milliGRAM(s) Oral daily    LAST 24-HR VITALS:  T(C): 36.9 (09-04-20 @ 08:03), Max: 36.9 (09-04-20 @ 08:03)  HR: 71 (09-04-20 @ 08:03) (70 - 76)  BP: 129/72 (09-04-20 @ 08:03) (129/72 - 174/79)  ABP: --  RR: 18 (09-04-20 @ 08:03) (16 - 18)  SpO2: 96% (09-04-20 @ 08:03) (93% - 96%)  CVP(cm H2O): --    GENERAL PHYSICAL EXAM:  GEN: no distress   HEENT:  NCAT, OP clear  EYES: sclera white, conjunctiva clear, no nystagmus  NECK: supple  CV: RRR, no murmur     		  PULM: CTAB, no wheezing  ABD: soft, +BS, NT  EXT: peripheral pulse intact, no cyanosis  MSK: muscle tone and strength normal  SKIN: warm, dry, no rash or lesion on exposed skin     NEUROLOGICAL EXAM:  Mental Status  Orientation: alert and oriented to person, place, time, and situation   Language: clear and fluent, intact comprehension and repetition, intact naming and reading    Cranial Nerves  II: full visual fields intact   III, IV, VI: PERRL, EOMI  V, VII: facial sensation and movement intact and symmetric   VIII: hearing intact   IX, X: uvula midline, soft palate elevates normally   XI: BL shoulder shrug intact   XII: tongue midline    Motor  5/5 BUE and BLE                 Tone and bulk are normal in upper and lower limbs  No pronator drift    Sensation  Intact to light touch and pinprick in all 4 EXTs    Reflex  2+ in BL biceps and patella                                    Plantar responses downward bilaterally    Coordination  Normal FTN bilaterally    Gait  Deferred       LABS:                          13.8   9.80  )-----------( 206      ( 03 Sep 2020 04:16 )             37.3     09-03    141  |  105  |  12.0  ----------------------------<  109<H>  3.2<L>   |  24.0  |  1.15    Ca    9.0      03 Sep 2020 04:16  Phos  4.1     09-02  Mg     2.4     09-02    TPro  6.4<L>  /  Alb  3.9  /  TBili  0.6  /  DBili  x   /  AST  23  /  ALT  25  /  AlkPhos  52  09-03    CAPILLARY BLOOD GLUCOSE        LIVER FUNCTIONS - ( 03 Sep 2020 04:16 )  Alb: 3.9 g/dL / Pro: 6.4 g/dL / ALK PHOS: 52 U/L / ALT: 25 U/L / AST: 23 U/L / GGT: x           PT/INR - ( 02 Sep 2020 17:03 )   PT: 11.4 sec;   INR: 0.98 ratio         PTT - ( 02 Sep 2020 17:03 )  PTT:35.2 sec      OTHER IMAGING AND STUDIES:    cvEEG 9/3 - 9/4/20:  EEG SUMMARY/CLASSIFICATION    Abnormal EEG in the awake, drowsy and asleep states.  - Frequent right temporal (max F8/T8/F10/P10) sharp wave discharges in sleep.  - Intermittent theta/delta slowing in the right temporal (max F8/T8/F10/P10) region, with field extending to the right frontal region.     _____________________________________________________________  EEG IMPRESSION/CLINICAL CORRELATE    Abnormal EEG study.  1. Potential epileptogenic focus and functional abnormality in the right temporal region.   2. No seizure seen.
[place] [facility]                                                               Office: [address] [address]                                                              Telephone: [phone]. Fax:[phone]                                                                             PROGRESS NOTE  Reason for follow up: HTN, syncope  Overnight: No new events.   Update: was on telemetry for 24hrs without event, EEG yesterday found with R-temporal epileptic form discharge started on IV Keppra, currently on vEEG. -170s      Review of symptoms:   Cardiac:  No chest pain. No dyspnea. No palpitations.  Respiratory: No cough. No dyspnea  Gastrointestinal: No diarrhea. No abdominal pain. No bleeding.     Past medical history: No updates.   	  Vital Signs Last 24 Hrs  T(C): 36.9 (09-04-20 @ 08:03), Max: 36.9 (09-04-20 @ 08:03)  T(F): 98.5 (09-04-20 @ 08:03), Max: 98.5 (09-04-20 @ 08:03)  HR: 71 (09-04-20 @ 08:03) (70 - 76)  BP: 129/72 (09-04-20 @ 08:03) (129/72 - 197/114)  BP(mean): 148 (09-03-20 @ 10:59) (148 - 148)  RR: 18 (09-04-20 @ 08:03) (16 - 18)  SpO2: 96% (09-04-20 @ 08:03) (93% - 98%)  I&O's Summary    03 Sep 2020 07:01  -  04 Sep 2020 07:00  --------------------------------------------------------  IN: 0 mL / OUT: 400 mL / NET: -400 mL          PHYSICAL EXAM:  Appearance: Comfortable. No acute distress, asleep snoring, morbid obesity, on vEEG  HEENT:  Head and neck: Atraumatic. Normocephalic.  Normal oral mucosa, PERRL, Neck is supple. No JVD, No carotid bruit.   Neurologic: A&Ox 3, no focal deficits. EOMI, Cranial nerves are intact.  Lymphatic: No cervical lymphadenopathy  Cardiovascular: Normal S1 S2, No murmur, rubs/gallops. No JVD, No edema  Respiratory: Lungs clear to auscultation  Gastrointestinal:  Soft, Non-tender, + BS  Lower Extremities: No edema  Psychiatry: Patient is calm. No agitation. Mood & affect appropriate  Skin: No rashes/ecchymoses/cyanosis/ulcers visualized on the face, hands or feet.      CURRENT MEDICATIONS:  MEDICATIONS  (STANDING):  amLODIPine   Tablet 10 milliGRAM(s) Oral daily  ATENolol  Tablet 100 milliGRAM(s) Oral daily  doxazosin 8 milliGRAM(s) Oral at bedtime  heparin   Injectable 5000 Unit(s) SubCutaneous every 8 hours  levETIRAcetam  IVPB 1500 milliGRAM(s) IV Intermittent every 12 hours  lidocaine   Patch 1 Patch Transdermal daily  spironolactone 25 milliGRAM(s) Oral daily    MEDICATIONS  (PRN):  acetaminophen   Tablet .. 650 milliGRAM(s) Oral every 6 hours PRN Temp greater or equal to 38C (100.4F), Mild Pain (1 - 3)  hydrALAZINE Injectable 10 milliGRAM(s) IV Push every 6 hours PRN SBP > 160  labetalol Injectable 10 milliGRAM(s) IV Push every 4 hours PRN SBP>180  LORazepam   Injectable 2 milliGRAM(s) IV Push every 2 hours PRN seizure      DIAGNOSTIC TESTING:  [ ] Echocardiogram:   < from: TTE Echo Complete w/o Contrast w/ Doppler (09.03.20 @ 13:31) >    Summary:   1. Left ventricular ejection fraction, by visual estimation, is 60 to 65%.   2.Normal global left ventricular systolic function.   3. Spectral Doppler shows impaired relaxation pattern of left ventricular myocardial filling (Grade I diastolic dysfunction).   4. Normal left ventricular internal cavity size.   5. There is moderate concentric left ventricular hypertrophy.   6. Evidence of mid cavitary obstruction due to LVH.   7. Normal right ventricular size and function.   8. Mildly enlarged left atrium.   9. Mildly enlarged right atrium.  10. Mild mitral valve regurgitation.  11. There is no evidence of pericardial effusion.    < end of copied text >    [ ]  Catheterization:  [ ] Stress Test:      Labs:                        13.8   9.80  )-----------( 206      ( 03 Sep 2020 04:16 )             37.3     09-03    141  |  105  |  12.0  ----------------------------<  109<H>  3.2<L>   |  24.0  |  1.15    Ca    9.0      03 Sep 2020 04:16  Phos  4.1     09-02  Mg     2.4     09-02    TPro  6.4<L>  /  Alb  3.9  /  TBili  0.6  /  DBili  x   /  AST  23  /  ALT  25  /  AlkPhos  52  09-03     02 Sep 2020 17:03 Troponin <0.01 ng/mL / Creatine Kinase x     /  CKMB x     / CPK Mass Assay % x              A1C with Estimated Average Glucose Result: 5.4 % (09-03-20 @ 04:16)      TELEMETRY: not on monitor
INTERVAL HPI/OVERNIGHT EVENTS:   Follwo uP HTN  emergency,  seizure   ?paraganglioma     Pt feelign betetr to day   doing 24 hr EEG   pt says he  thought  it was questionable seizure at work-  I told him he had one  downstairs iN ER when he came in   He did not  remember   MEDICATIONS  (STANDING):  amLODIPine   Tablet 10 milliGRAM(s) Oral daily  ATENolol  Tablet 100 milliGRAM(s) Oral daily  doxazosin 8 milliGRAM(s) Oral at bedtime  heparin   Injectable 5000 Unit(s) SubCutaneous every 8 hours  levETIRAcetam  IVPB 1000 milliGRAM(s) IV Intermittent every 12 hours  lidocaine   Patch 1 Patch Transdermal daily  spironolactone 25 milliGRAM(s) Oral daily    MEDICATIONS  (PRN):  acetaminophen   Tablet .. 650 milliGRAM(s) Oral every 6 hours PRN Temp greater or equal to 38C (100.4F), Mild Pain (1 - 3)  hydrALAZINE Injectable 10 milliGRAM(s) IV Push every 6 hours PRN SBP > 160  labetalol Injectable 10 milliGRAM(s) IV Push every 4 hours PRN SBP>180  LORazepam   Injectable 2 milliGRAM(s) IV Push every 2 hours PRN seizure      Allergies    No Known Allergies    Intolerances    No pancakes/no Egyptian toast/prefers omelets in am RDOK (Unknown)      Review of systems:    Vital Signs Last 24 Hrs  T(C): 36.7 (03 Sep 2020 23:31), Max: 37.1 (03 Sep 2020 05:05)  T(F): 98 (03 Sep 2020 23:31), Max: 98.8 (03 Sep 2020 05:05)  HR: 70 (03 Sep 2020 23:31) (69 - 76)  BP: 174/79 (03 Sep 2020 23:31) (135/84 - 197/114)  BP(mean): 148 (03 Sep 2020 10:59) (148 - 148)  RR: 16 (03 Sep 2020 23:31) (16 - 18)  SpO2: 93% (03 Sep 2020 23:31) (93% - 98%)    PHYSICAL EXAM:      Constitutional: NAD, well-groomed, well-developed  Respiratory: CTAB  Cardiovascular: S1 and S2, RRR, no M/G/R  Extremities: No peripheral edema, no pedal lesions    Neurological: A/O x 3, no focal deficits  Psychiatric: Normal mood, normal affect  Musculoskeletal: 5/5 strength b/l upper and lower extremities  Skin: No rashes, no acanthosis        LABS:                        13.8   9.80  )-----------( 206      ( 03 Sep 2020 04:16 )             37.3     09-03    141  |  105  |  12.0  ----------------------------<  109<H>  3.2<L>   |  24.0  |  1.15    Ca    9.0      03 Sep 2020 04:16  Phos  4.1     09-02  Mg     2.4     09-02    TPro  6.4<L>  /  Alb  3.9  /  TBili  0.6  /  DBili  x   /  AST  23  /  ALT  25  /  AlkPhos  52  09-03          CAPILLARY BLOOD GLUCOSE          RADIOLOGY & ADDITIONAL TESTS:
Patient is a 30y old  Male who presents with a chief complaint of seizure, Hypertensive emergency (04 Sep 2020 11:51)    Patient seen and examined at bedside. patient d/w neurology ok to d/c on kePayActivra w/ instructions no driving x 1 year. patient d/w endocrinology will need urine 24 hour after following restricted diet x 1 week. rx for 24 hour urine given to patient. patient may also need mibg scan outpatient will follow up with endo to determine    ALLERGIES:  No Known Allergies  No pancakes/no Monegasque toast/prefers omelets in am RDOK (Unknown)    MEDICATIONS  (STANDING):  amLODIPine   Tablet 10 milliGRAM(s) Oral daily  ATENolol  Tablet 100 milliGRAM(s) Oral daily  doxazosin 8 milliGRAM(s) Oral at bedtime  heparin   Injectable 5000 Unit(s) SubCutaneous every 8 hours  levETIRAcetam 1500 milliGRAM(s) Oral two times a day  lidocaine   Patch 1 Patch Transdermal daily  spironolactone 25 milliGRAM(s) Oral daily    MEDICATIONS  (PRN):  acetaminophen   Tablet .. 650 milliGRAM(s) Oral every 6 hours PRN Temp greater or equal to 38C (100.4F), Mild Pain (1 - 3)  hydrALAZINE Injectable 10 milliGRAM(s) IV Push every 6 hours PRN SBP > 160  labetalol Injectable 10 milliGRAM(s) IV Push every 4 hours PRN SBP>180  LORazepam   Injectable 2 milliGRAM(s) IV Push every 2 hours PRN seizure    Vital Signs Last 24 Hrs  T(F): 98.5 (04 Sep 2020 08:03), Max: 98.5 (04 Sep 2020 08:03)  HR: 71 (04 Sep 2020 08:03) (70 - 76)  BP: 129/72 (04 Sep 2020 08:03) (129/72 - 174/79)  RR: 18 (04 Sep 2020 08:03) (16 - 18)  SpO2: 96% (04 Sep 2020 08:03) (93% - 96%)  I&O's Summary    03 Sep 2020 07:01  -  04 Sep 2020 07:00  --------------------------------------------------------  IN: 0 mL / OUT: 400 mL / NET: -400 mL    PHYSICAL EXAM:  General: NAD, A/O x 3, obese  ENT: MMM, no thrush  Neck: Supple, No JVD  Lungs: Clear to auscultation bilaterally, good air entry, non-labored breathing  Cardio: +s1/s2, No pitting edema  Abdomen: Soft, Nontender, Nondistended; Bowel sounds present  Extremities: No calf tenderness      LABS:                        13.8   9.80  )-----------( 206      ( 03 Sep 2020 04:16 )             37.3     09-03    141  |  105  |  12.0  ----------------------------<  109  3.2   |  24.0  |  1.15    Ca    9.0      03 Sep 2020 04:16  Phos  4.1     09-02  Mg     2.4     09-02    TPro  6.4  /  Alb  3.9  /  TBili  0.6  /  DBili  x   /  AST  23  /  ALT  25  /  AlkPhos  52  09-03    eGFR if Non African American: 86 mL/min/1.73M2 (09-03-20 @ 04:16)  eGFR if African American: 100 mL/min/1.73M2 (09-03-20 @ 04:16)    PT/INR - ( 02 Sep 2020 17:03 )   PT: 11.4 sec;   INR: 0.98 ratio    PTT - ( 02 Sep 2020 17:03 )  PTT:35.2 sec    CARDIAC MARKERS ( 02 Sep 2020 17:03 )  x     / <0.01 ng/mL / x     / x     / x        RADIOLOGY & ADDITIONAL TESTS:  < from: MR Head w/wo IV Cont (09.03.20 @ 18:31) >  IMPRESSION: No acute infarction. No abnormal enhancement.  < end of copied text >    < from: CT Abdomen and Pelvis No Cont (09.03.20 @ 10:09) >  IMPRESSION:  Mild right adrenal gland thickening.  < end of copied text >    < from: Xray Shoulder 2 Views, Right (09.02.20 @ 20:02) >  IMPRESSION:    Noacute radiographic osseous pathology.  < end of copied text >    < from: CT Head No Cont (09.02.20 @ 16:41) >  IMPRESSION:    Abnormal EEG study.  1. Potential epileptogenic focus and functional abnormality in the right temporal region.   2. No seizure seen.    Care Discussed with Consultants/Other Providers:   Neurology  Endocrinology
[place] [facility]                                                               Office: [address] [address]                                                              Telephone: [phone]. Fax:[phone]                                                                             PROGRESS NOTE  Reason for follow up: HTN urgency  Overnight: No new events.   Update: BP better controlled 150/90s with increased dose of atenolol to 100mg. Denies any discomfort other than R-shoulder pain s/p fall yesterday.       Review of symptoms:   Cardiac:  No chest pain. No dyspnea. No palpitations.  Respiratory: No cough. No dyspnea  Gastrointestinal: No diarrhea. No abdominal pain. No bleeding.     Past medical history: No updates.   	  Vital Signs Last 24 Hrs  T(C): 36.6 (09-03-20 @ 07:34), Max: 37.1 (09-03-20 @ 05:05)  T(F): 97.8 (09-03-20 @ 07:34), Max: 98.8 (09-03-20 @ 05:05)  HR: 69 (09-03-20 @ 07:34) (65 - 104)  BP: 151/93 (09-03-20 @ 07:34) (140/89 - 217/113)  BP(mean): --  RR: 17 (09-03-20 @ 07:34) (16 - 20)  SpO2: 98% (09-03-20 @ 07:34) (93% - 99%)  I&O's Summary    02 Sep 2020 07:01  -  03 Sep 2020 07:00  --------------------------------------------------------  IN: 600 mL / OUT: 750 mL / NET: -150 mL          PHYSICAL EXAM:  Appearance: Comfortable. No acute distress, morbidly obese  HEENT:  Head and neck: Atraumatic. Normocephalic.  Normal oral mucosa, PERRL, Neck is supple. No JVD, No carotid bruit.   Neurologic: A&Ox 3, no focal deficits. EOMI, Cranial nerves are intact.  Lymphatic: No cervical lymphadenopathy  Cardiovascular: Normal S1 S2, No murmur, rubs/gallops. No JVD, No edema  Respiratory: Lungs clear to auscultation  Gastrointestinal:  Soft, Non-tender, + BS  Lower Extremities: No edema  Psychiatry: Patient is calm. No agitation. Mood & affect appropriate  Skin: No rashes/ecchymoses/cyanosis/ulcers visualized on the face, hands or feet.      CURRENT MEDICATIONS:  MEDICATIONS  (STANDING):  amLODIPine   Tablet 10 milliGRAM(s) Oral daily  ATENolol  Tablet 100 milliGRAM(s) Oral daily  doxazosin 8 milliGRAM(s) Oral at bedtime  heparin   Injectable 5000 Unit(s) SubCutaneous every 8 hours    MEDICATIONS  (PRN):  acetaminophen   Tablet .. 650 milliGRAM(s) Oral every 6 hours PRN Temp greater or equal to 38C (100.4F), Mild Pain (1 - 3)  labetalol Injectable 10 milliGRAM(s) IV Push every 4 hours PRN SBP>180  LORazepam   Injectable 2 milliGRAM(s) IV Push every 2 hours PRN seizure  morphine  - Injectable 4 milliGRAM(s) IV Push every 6 hours PRN Severe Pain (7 - 10)  traMADol 25 milliGRAM(s) Oral every 6 hours PRN Moderate Pain (4 - 6)      DIAGNOSTIC TESTING:  [ ] Echocardiogram:   < from: TTE Echo Complete w/Doppler (07.31.19 @ 17:54) >  Summary:   1. There is moderate concentric left ventricular hypertrophy.   2. Hyperdynamic global left ventricular systolic function.   3. Left ventricular ejection fraction, by visual estimation, is 70 to   75%.   4. Spectral Doppler shows impaired relaxation pattern of left   ventricular myocardial filling (Grade I diastolic dysfunction).   5. Normal right ventricular size and function.   6. The left atrium is normal in size.   7. The right atrium is normal in size.   8. Structurally normal mitral valve, with normal leaflet excursion.   9. Normal trileaflet aortic valve with normal opening.  10. There is no evidence of pericardial effusion.    < end of copied text >    [ ]  Catheterization:  [ ] Stress Test:      Labs:                        13.8   9.80  )-----------( 206      ( 03 Sep 2020 04:16 )             37.3     09-03    141  |  105  |  12.0  ----------------------------<  109<H>  3.2<L>   |  24.0  |  1.15    Ca    9.0      03 Sep 2020 04:16  Phos  4.1     09-02  Mg     2.4     09-02    TPro  6.4<L>  /  Alb  3.9  /  TBili  0.6  /  DBili  x   /  AST  23  /  ALT  25  /  AlkPhos  52  09-03     02 Sep 2020 17:03 Troponin <0.01 ng/mL / Creatine Kinase x     /  CKMB x     / CPK Mass Assay % x              A1C with Estimated Average Glucose Result: 5.4 % (09-03-20 @ 04:16)      TELEMETRY: Sinus 70s
Patient is a 30y old  Male who presents with a chief complaint of seizure, Hypertensive emergency (03 Sep 2020 09:14)    Patient seen and examined at bedside.    ALLERGIES:  No Known Allergies  No pancakes/no Upper sorbian toast/prefers omelets in am RDOK (Unknown)    MEDICATIONS  (STANDING):  amLODIPine   Tablet 10 milliGRAM(s) Oral daily  ATENolol  Tablet 100 milliGRAM(s) Oral daily  doxazosin 8 milliGRAM(s) Oral at bedtime  heparin   Injectable 5000 Unit(s) SubCutaneous every 8 hours  spironolactone 25 milliGRAM(s) Oral daily    MEDICATIONS  (PRN):  acetaminophen   Tablet .. 650 milliGRAM(s) Oral every 6 hours PRN Temp greater or equal to 38C (100.4F), Mild Pain (1 - 3)  labetalol Injectable 10 milliGRAM(s) IV Push every 4 hours PRN SBP>180  LORazepam   Injectable 2 milliGRAM(s) IV Push every 2 hours PRN seizure    Vital Signs Last 24 Hrs  T(F): 97.9 (03 Sep 2020 10:59), Max: 98.8 (03 Sep 2020 05:05)  HR: 75 (03 Sep 2020 11:11) (65 - 104)  BP: 135/84 (03 Sep 2020 11:11) (135/84 - 217/113)  RR: 16 (03 Sep 2020 10:59) (16 - 20)  SpO2: 98% (03 Sep 2020 10:59) (93% - 99%)  I&O's Summary    02 Sep 2020 07:01  -  03 Sep 2020 07:00  --------------------------------------------------------  IN: 600 mL / OUT: 750 mL / NET: -150 mL      PHYSICAL EXAM:  General: NAD, A/O x 3, obese  ENT: MMM, no thrush  Neck: Supple, No JVD  Lungs: Clear to auscultation bilaterally, good air entry, non-labored breathing  Cardio: +s1/s2, No pitting edema  Abdomen: Soft, Nontender, Nondistended; Bowel sounds present  Extremities: No calf tenderness    LABS:                        13.8   9.80  )-----------( 206      ( 03 Sep 2020 04:16 )             37.3     09-03    141  |  105  |  12.0  ----------------------------<  109  3.2   |  24.0  |  1.15    Ca    9.0      03 Sep 2020 04:16  Phos  4.1     09-02  Mg     2.4     09-02    TPro  6.4  /  Alb  3.9  /  TBili  0.6  /  DBili  x   /  AST  23  /  ALT  25  /  AlkPhos  52  09-03    eGFR if Non African American: 86 mL/min/1.73M2 (09-03-20 @ 04:16)  eGFR if African American: 100 mL/min/1.73M2 (09-03-20 @ 04:16)    PT/INR - ( 02 Sep 2020 17:03 )   PT: 11.4 sec;   INR: 0.98 ratio    PTT - ( 02 Sep 2020 17:03 )  PTT:35.2 sec    CARDIAC MARKERS ( 02 Sep 2020 17:03 )  x     / <0.01 ng/mL / x     / x     / x        Glucose  POCT Blood Glucose.: 196 mg/dL (02 Sep 2020 16:26)    RADIOLOGY & ADDITIONAL TESTS:  < from: CT Abdomen and Pelvis No Cont (09.03.20 @ 10:09) >  IMPRESSION:  Mild right adrenal gland thickening.  < end of copied text >    < from: Xray Shoulder 2 Views, Right (09.02.20 @ 20:02) >  IMPRESSION:    Noacute radiographic osseous pathology.  < end of copied text >    < from: CT Head No Cont (09.02.20 @ 16:41) >  IMPRESSION:  No evidence of acute infarct, intracranial hemorrhage or mass effect.  < end of copied text >    Care Discussed with Consultants/Other Providers:   Neurology  Cardiology

## 2020-09-04 NOTE — DISCHARGE NOTE PROVIDER - HOSPITAL COURSE
30 year old male with pmh of htn coming to hospital with htn emergency and seizures. patient now stable. patient s/p eeg and found to have abnormal electrical activity then started on keppra via neurology. patient also seen by endo and cardiology for blood pressure. ?hx of pheochromocytoma however negative path. patient will need 24 hour urine checked and possible mibg scan outpatient via endo. patient now being d/c in stable condition            Time spent on patients discharge 32 minutes

## 2020-09-04 NOTE — PROGRESS NOTE ADULT - REASON FOR ADMISSION
seizure, Hypertensive emergency

## 2020-09-04 NOTE — CHART NOTE - NSCHARTNOTEFT_GEN_A_CORE
To whom it may concern:    The above named patient was admitted to [facility] in [place], [place]. He may not drive until cleared by Neurology.     Thank You for your understanding.        Michael Hinton MD   [employer] License [id number]

## 2020-09-04 NOTE — DISCHARGE NOTE NURSING/CASE MANAGEMENT/SOCIAL WORK - PATIENT PORTAL LINK FT
You can access the FollowMyHealth Patient Portal offered by [facility] by registering at the following website: [email or web address]. By joining [facility]’s FollowMyHealth portal, you will also be able to view your health information using other applications (apps) compatible with our system.

## 2020-09-06 LAB
METANEPHRINE, PL: 39.3 PG/ML — SIGNIFICANT CHANGE UP (ref 0–88)
NORMETANEPHRINE, PL: 101.6 PG/ML — SIGNIFICANT CHANGE UP (ref 0–107.7)

## 2020-09-07 LAB — METANEPH UR-MCNC: SIGNIFICANT CHANGE UP

## 2020-09-10 LAB — RENIN PLAS-CCNC: 0.29 NG/ML/HR — SIGNIFICANT CHANGE UP (ref 0.17–5.38)

## 2020-09-29 ENCOUNTER — APPOINTMENT (OUTPATIENT)
Dept: CARDIOLOGY | Facility: CLINIC | Age: 30
End: 2020-09-29
Payer: MEDICAID

## 2020-09-29 ENCOUNTER — NON-APPOINTMENT (OUTPATIENT)
Age: 30
End: 2020-09-29

## 2020-09-29 VITALS
DIASTOLIC BLOOD PRESSURE: 82 MMHG | HEART RATE: 75 BPM | HEIGHT: 73 IN | WEIGHT: 315 LBS | BODY MASS INDEX: 41.75 KG/M2 | TEMPERATURE: 97.2 F | SYSTOLIC BLOOD PRESSURE: 126 MMHG | OXYGEN SATURATION: 96 %

## 2020-09-29 VITALS — DIASTOLIC BLOOD PRESSURE: 78 MMHG | SYSTOLIC BLOOD PRESSURE: 136 MMHG

## 2020-09-29 DIAGNOSIS — I10 ESSENTIAL (PRIMARY) HYPERTENSION: ICD-10-CM

## 2020-09-29 DIAGNOSIS — R55 SYNCOPE AND COLLAPSE: ICD-10-CM

## 2020-09-29 DIAGNOSIS — E66.9 OBESITY, UNSPECIFIED: ICD-10-CM

## 2020-09-29 PROCEDURE — 99214 OFFICE O/P EST MOD 30 MIN: CPT

## 2020-09-29 PROCEDURE — 93000 ELECTROCARDIOGRAM COMPLETE: CPT

## 2020-09-29 RX ORDER — DOXAZOSIN 8 MG/1
8 TABLET ORAL
Qty: 180 | Refills: 1 | Status: DISCONTINUED | COMMUNITY
End: 2020-09-29

## 2020-09-29 RX ORDER — LOSARTAN POTASSIUM 100 MG/1
100 TABLET, FILM COATED ORAL
Refills: 0 | Status: DISCONTINUED | COMMUNITY
Start: 2020-09-21 | End: 2020-09-29

## 2020-09-29 NOTE — HISTORY OF PRESENT ILLNESS
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, still with uncontrolled HTN (on amlodipine 10mg, atenolol 50mg, doxazosin 8mg), followed up with Endocrine in 8/2020 pending repeat MIBG scan, presented to [facility] on 9/2/20 after syncope/seizure episode at work along with HTN urgency 202/115, hospitalized until 9/4/20, TTE with pEF 60-65% and LVH, EEG with +epileptiform foci at the  R-temporal region, repeat serum renin/dasia and urine/plasma metanephrine were normal, unable to obtain MIBG scan during hospitalization, added spironolactone for BP control and hypokalemia, presents for initial outpatient cardiology follow up.\par \par Reports feeling well except recently got fired from his job due to his medical condition, he is applying for security position. Adherent with his medications, not yet obtain BP cuff; pending Neurology and Endocrine follow up; smokes marijuana but no other illicit drugs or alcohol use. Reportedly follow-up with his PMD and had repeat blood work told renal function stable.

## 2020-09-29 NOTE — PHYSICAL EXAM
[General Appearance - Well Developed] : well developed [General Appearance - Well Nourished] : well nourished [Normal Conjunctiva] : the conjunctiva exhibited no abnormalities [FreeTextEntry1] : no JVD or carotid bruit [Heart Rate And Rhythm] : heart rate and rhythm were normal [Heart Sounds] : normal S1 and S2 [] : no respiratory distress [Respiration, Rhythm And Depth] : normal respiratory rhythm and effort [Bowel Sounds] : normal bowel sounds [Abnormal Walk] : normal gait [Nail Clubbing] : no clubbing of the fingernails [Skin Color & Pigmentation] : normal skin color and pigmentation [Oriented To Time, Place, And Person] : oriented to person, place, and time [Affect] : the affect was normal [Mood] : the mood was normal

## 2020-09-29 NOTE — REVIEW OF SYSTEMS
[Fever] : no fever [Chills] : no chills [Blurry Vision] : no blurred vision [Earache] : no earache [Shortness Of Breath] : no shortness of breath [Dyspnea on exertion] : not dyspnea during exertion [Chest  Pressure] : no chest pressure [Chest Pain] : no chest pain [Palpitations] : no palpitations [Cough] : no cough [Abdominal Pain] : no abdominal pain [Urinary Frequency] : no change in urinary frequency [Joint Pain] : no joint pain [Skin: A Rash] : no rash: [Dizziness] : no dizziness [Confusion] : no confusion was observed [Excessive Thirst] : no polydipsia [Easy Bleeding] : no tendency for easy bleeding

## 2020-09-29 NOTE — DISCUSSION/SUMMARY
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, still with uncontrolled HTN (on amlodipine 10mg, atenolol 50mg, doxazosin 8mg), followed up with Endocrine in 8/2020 pending repeat MIBG scan, presented to [facility]/20 after syncope/seizure episode at work along with HTN urgency 202/115, hospitalized until 9/4/20, TTE with pEF 60-65% and LVH, EEG with +epileptiform foci at the  R-temporal region, repeat serum renin/dasia and urine/plasma metanephrine were normal, unable to obtain MIBG scan during hospitalization, added spironolactone for BP control and hypokalemia, presents for initial outpatient cardiology follow up.\par \par  \par \par 1. Resistant HTN- secondary workup thus far negative, pending Endocrine follow up for MIBG scan; BP well controlled on current regimen. \par \par 2. Morbid obesity- along with HTN- referral for bariatric surgery evaluation. \par \par 3. Seizure disorder- continue AED, neurology follow up. \par \par 4. Recent syncope- in setting of seizure and HTN urgency; defer Holter/Event monitoring for now. \par \par \par Follow up in 3 months.

## 2020-10-08 ENCOUNTER — EMERGENCY (EMERGENCY)
Facility: HOSPITAL | Age: 30
LOS: 1 days | Discharge: HOME | End: 2020-10-08
Attending: STUDENT IN AN ORGANIZED HEALTH CARE EDUCATION/TRAINING PROGRAM
Payer: COMMERCIAL

## 2020-10-08 VITALS
RESPIRATION RATE: 18 BRPM | HEART RATE: 76 BPM | DIASTOLIC BLOOD PRESSURE: 78 MMHG | TEMPERATURE: 98 F | SYSTOLIC BLOOD PRESSURE: 134 MMHG | OXYGEN SATURATION: 98 %

## 2020-10-08 VITALS
TEMPERATURE: 98 F | HEIGHT: 72 IN | RESPIRATION RATE: 18 BRPM | DIASTOLIC BLOOD PRESSURE: 112 MMHG | OXYGEN SATURATION: 100 % | HEART RATE: 84 BPM | WEIGHT: 315 LBS | SYSTOLIC BLOOD PRESSURE: 165 MMHG

## 2020-10-08 LAB
ALBUMIN SERPL ELPH-MCNC: 4 G/DL — SIGNIFICANT CHANGE UP (ref 3.3–5.2)
ALP SERPL-CCNC: 61 U/L — SIGNIFICANT CHANGE UP (ref 40–120)
ALT FLD-CCNC: 24 U/L — SIGNIFICANT CHANGE UP
ANION GAP SERPL CALC-SCNC: 12 MMOL/L — SIGNIFICANT CHANGE UP (ref 5–17)
AST SERPL-CCNC: 18 U/L — SIGNIFICANT CHANGE UP
BASOPHILS # BLD AUTO: 0.05 K/UL — SIGNIFICANT CHANGE UP (ref 0–0.2)
BASOPHILS NFR BLD AUTO: 0.5 % — SIGNIFICANT CHANGE UP (ref 0–2)
BILIRUB SERPL-MCNC: 0.2 MG/DL — LOW (ref 0.4–2)
BUN SERPL-MCNC: 13 MG/DL — SIGNIFICANT CHANGE UP (ref 8–20)
CALCIUM SERPL-MCNC: 9.2 MG/DL — SIGNIFICANT CHANGE UP (ref 8.6–10.2)
CHLORIDE SERPL-SCNC: 103 MMOL/L — SIGNIFICANT CHANGE UP (ref 98–107)
CO2 SERPL-SCNC: 22 MMOL/L — SIGNIFICANT CHANGE UP (ref 22–29)
CREAT SERPL-MCNC: 1.14 MG/DL — SIGNIFICANT CHANGE UP (ref 0.5–1.3)
EOSINOPHIL # BLD AUTO: 0.24 K/UL — SIGNIFICANT CHANGE UP (ref 0–0.5)
EOSINOPHIL NFR BLD AUTO: 2.6 % — SIGNIFICANT CHANGE UP (ref 0–6)
GLUCOSE SERPL-MCNC: 132 MG/DL — HIGH (ref 70–99)
HCT VFR BLD CALC: 38.3 % — LOW (ref 39–50)
HGB BLD-MCNC: 14.1 G/DL — SIGNIFICANT CHANGE UP (ref 13–17)
IMM GRANULOCYTES NFR BLD AUTO: 1.2 % — SIGNIFICANT CHANGE UP (ref 0–1.5)
LYMPHOCYTES # BLD AUTO: 2.55 K/UL — SIGNIFICANT CHANGE UP (ref 1–3.3)
LYMPHOCYTES # BLD AUTO: 27.3 % — SIGNIFICANT CHANGE UP (ref 13–44)
MCHC RBC-ENTMCNC: 31.5 PG — SIGNIFICANT CHANGE UP (ref 27–34)
MCHC RBC-ENTMCNC: 36.8 GM/DL — HIGH (ref 32–36)
MCV RBC AUTO: 85.7 FL — SIGNIFICANT CHANGE UP (ref 80–100)
MONOCYTES # BLD AUTO: 0.79 K/UL — SIGNIFICANT CHANGE UP (ref 0–0.9)
MONOCYTES NFR BLD AUTO: 8.5 % — SIGNIFICANT CHANGE UP (ref 2–14)
NEUTROPHILS # BLD AUTO: 5.59 K/UL — SIGNIFICANT CHANGE UP (ref 1.8–7.4)
NEUTROPHILS NFR BLD AUTO: 59.9 % — SIGNIFICANT CHANGE UP (ref 43–77)
PLATELET # BLD AUTO: 209 K/UL — SIGNIFICANT CHANGE UP (ref 150–400)
POTASSIUM SERPL-MCNC: 4 MMOL/L — SIGNIFICANT CHANGE UP (ref 3.5–5.3)
POTASSIUM SERPL-SCNC: 4 MMOL/L — SIGNIFICANT CHANGE UP (ref 3.5–5.3)
PROT SERPL-MCNC: 7.5 G/DL — SIGNIFICANT CHANGE UP (ref 6.6–8.7)
RBC # BLD: 4.47 M/UL — SIGNIFICANT CHANGE UP (ref 4.2–5.8)
RBC # FLD: 11.4 % — SIGNIFICANT CHANGE UP (ref 10.3–14.5)
SODIUM SERPL-SCNC: 137 MMOL/L — SIGNIFICANT CHANGE UP (ref 135–145)
WBC # BLD: 9.33 K/UL — SIGNIFICANT CHANGE UP (ref 3.8–10.5)
WBC # FLD AUTO: 9.33 K/UL — SIGNIFICANT CHANGE UP (ref 3.8–10.5)

## 2020-10-08 PROCEDURE — 85025 COMPLETE CBC W/AUTO DIFF WBC: CPT

## 2020-10-08 PROCEDURE — 80053 COMPREHEN METABOLIC PANEL: CPT

## 2020-10-08 PROCEDURE — 36415 COLL VENOUS BLD VENIPUNCTURE: CPT

## 2020-10-08 PROCEDURE — 99284 EMERGENCY DEPT VISIT MOD MDM: CPT

## 2020-10-08 PROCEDURE — 99284 EMERGENCY DEPT VISIT MOD MDM: CPT | Mod: 25

## 2020-10-08 RX ORDER — LEVETIRACETAM 250 MG/1
750 TABLET, FILM COATED ORAL
Refills: 0 | Status: DISCONTINUED | OUTPATIENT
Start: 2020-10-08 | End: 2020-10-08

## 2020-10-08 RX ORDER — LEVETIRACETAM 250 MG/1
2 TABLET, FILM COATED ORAL
Qty: 28 | Refills: 0
Start: 2020-10-08 | End: 2020-10-14

## 2020-10-08 RX ORDER — LEVETIRACETAM 250 MG/1
1500 TABLET, FILM COATED ORAL ONCE
Refills: 0 | Status: COMPLETED | OUTPATIENT
Start: 2020-10-08 | End: 2020-10-08

## 2020-10-08 RX ORDER — LEVETIRACETAM 250 MG/1
1500 TABLET, FILM COATED ORAL ONCE
Refills: 0 | Status: DISCONTINUED | OUTPATIENT
Start: 2020-10-08 | End: 2020-10-08

## 2020-10-08 RX ADMIN — LEVETIRACETAM 1500 MILLIGRAM(S): 250 TABLET, FILM COATED ORAL at 10:49

## 2020-10-08 NOTE — ED PROVIDER NOTE - CARE PROVIDER_API CALL
Cecilia Hyatt)  Neurology  [address] [place] [address]  Phone: [phone]  Fax: [phone]  Scheduled Appointment: 10/12/2020

## 2020-10-08 NOTE — ED PROVIDER NOTE - ATTENDING CONTRIBUTION TO CARE
30 YOM PMHx seizures and HTN here from home for possible seizure this morning witnessed by fiance. Patient reports waking up in bed. No falls per fiance. Patient ran out of his keppra 1500mg BID, missed a dose last night. Reports marijuana usage but denies etoh or other drugs. Has appt with Dr. Hyatt in 4 days. Denies any symptoms at this time  AP - seizure likely in setting of missed medication. will give home dose here. check electrolytes. no signs of trauma. if w/up wnl can f/u with neuro Dr. Hyatt in 4 days.

## 2020-10-08 NOTE — ED PROVIDER NOTE - OBJECTIVE STATEMENT
29 y/o male with sig PMHx seizures and HTN BIBA from home for witness seizure by swapnil while in bed this morning. He had two tonic clonic seizures lasting 1.5 minutes each this morning while in bed. Patient unable to recall event. Patient notes running out of medication last night and takes 1500 mg BID. He also take multiple HTN medications which he did not take this morning. He denies any trauma, ha, pain, cp, sob, n/v/d, malaise.

## 2020-10-08 NOTE — ED PROVIDER NOTE - PROGRESS NOTE DETAILS
EA: Labs unremarkable, Rx Keppra 1500 in ED and wrote dose for 1 week to [facility] with follow up on Monday with Dr. MINERVA serrano. Patient resting well, comfortable, and asymptomatic. Discussed with patient, findings, plan of care, follow up, and return precautions. Patient verbalizes understanding and agrees with plan of care.

## 2020-10-08 NOTE — ED ADULT TRIAGE NOTE - CHIEF COMPLAINT QUOTE
Pt states "my fiance said I had a seizure this morning", pt with recent seizure hx and "ran out of medication yesterday", pt reports not taking HTN medication this morning due to seizure, offers no complaints, denies pain, no obvious trauma, pt A&O x's 4

## 2020-10-08 NOTE — ED PROVIDER NOTE - NSFOLLOWUPINSTRUCTIONS_ED_ALL_ED_FT
FOLLOW UP WITH NEUROLOGIST DR. PALMA ON MONDAY AS SCHEDULED      KEPPRA REFILL FROM [facility] AS PRESCRIBED. YOU WERE GIVEN 1500 MG THIS MORNING WHILE IN THE ER, BEGIN SECOND DAILY DOSE THIS EVENING.     CONTINUE TO TAKE BLOOD PRESSURE MEDICATIONS AS PRESCRIBED.     RETURN TO ER SOONER IF YOU DEVELOP ANY WORSENING SYMPTOMS, SEIZURES, HEADACHE, CHANGES IN VISION, WEAKNESS, LOS OF SENSATION, CHEST PAIN, OR SHORTNESS OF BREATH, OR TRAUMA.

## 2020-10-08 NOTE — ED PROVIDER NOTE - PATIENT PORTAL LINK FT
You can access the FollowMyHealth Patient Portal offered by [facility] by registering at the following website: [email or web address]. By joining [employer]’s FollowMyHealth portal, you will also be able to view your health information using other applications (apps) compatible with our system.

## 2020-10-08 NOTE — ED PROVIDER NOTE - CLINICAL SUMMARY MEDICAL DECISION MAKING FREE TEXT BOX
31 y/o male with dominique of seizure disorder presenting with 2 seizures this morning lasting 1.5 min witnessed by swapnil. Patient states he ran out of medication last night; Keppra 1500 mg BID and has appointment with Dr. Hyatt neurology on Monday. Patient well appearing at this time with no complaints and denies any trauma. Will check labs, give 1500 mg Keppra and rx 1 week of medication to [facility].

## 2020-10-09 PROBLEM — R56.9 UNSPECIFIED CONVULSIONS: Chronic | Status: ACTIVE | Noted: 2020-10-08

## 2020-10-13 ENCOUNTER — APPOINTMENT (OUTPATIENT)
Dept: NEUROLOGY | Facility: CLINIC | Age: 30
End: 2020-10-13
Payer: MEDICAID

## 2020-10-13 ENCOUNTER — RECORD ABSTRACTING (OUTPATIENT)
Age: 30
End: 2020-10-13

## 2020-10-13 VITALS
BODY MASS INDEX: 41.75 KG/M2 | HEART RATE: 78 BPM | OXYGEN SATURATION: 98 % | DIASTOLIC BLOOD PRESSURE: 98 MMHG | WEIGHT: 315 LBS | SYSTOLIC BLOOD PRESSURE: 151 MMHG | HEIGHT: 73 IN | TEMPERATURE: 97.3 F

## 2020-10-13 DIAGNOSIS — Z86.79 PERSONAL HISTORY OF OTHER DISEASES OF THE CIRCULATORY SYSTEM: ICD-10-CM

## 2020-10-13 DIAGNOSIS — G40.909 EPILEPSY, UNSPECIFIED, NOT INTRACTABLE, W/OUT STATUS EPILEPTICUS: ICD-10-CM

## 2020-10-13 DIAGNOSIS — Z82.49 FAMILY HISTORY OF ISCHEMIC HEART DISEASE AND OTHER DISEASES OF THE CIRCULATORY SYSTEM: ICD-10-CM

## 2020-10-13 PROCEDURE — 99215 OFFICE O/P EST HI 40 MIN: CPT

## 2020-10-13 NOTE — CONSULT LETTER
[Dear  ___] : Dear  [unfilled], [Sincerely,] : Sincerely, [FreeTextEntry1] : I had the pleasure of seeing your patient, RENU VALLECILLO, in my office today. Please see my note below. \par \par If you have any questions, please do not hesitate to contact me.  [FreeTextEntry3] : Cecilia Hyatt MD\par [facility]\par [facility] [place]\par [address] [place] [address]\par Phone: [phone]; Fax: [phone]\par \par

## 2020-10-13 NOTE — HISTORY OF PRESENT ILLNESS
[FreeTextEntry1] : PCP: Dr. Anastasiya Gillespie\par Adrianae: Zahida\par \par Mr. Barker is a 29 y/o RH man with a history of difficult-to-control HTN since 13yo, questionable thickening of the left adrenal gland up to 1.2cm concerning for pheochromocytoma s/p left adrenalectomy 8/15/19 at [facility] who presents today for post-hospital followup for recently diagnosed epilepsy. He is accompanied today by his fiancée Zahida. \par \par I saw the pt when he was hospitalized at [facility] 9/2 – 9/4/20. My note below:\par “This is a 30y RH Male with a history of difficult-to-control HTN since 13yo, questionable thickening of the left adrenal gland up to 1.2cm concerning for pheochromocytoma s/p left adrenalectomy 8/15/19 at [facility], one prior bilateral tonic clonic seizure on 3/12/19 who presented with 3 bilateral tonic clonic seizures on DOA. All 4 seizures have occurred in SBP in 190s to 240s. Epilepsy consulted to evaluate for epilepsy vs acute provoked symptomatic seizure secondary to HTN emergency.  \par \par First seizure on 3/2/19, witnessed by patient's father. MRI and rEEG were negative. So patient discharged without any antiepileptic medication. Had two witnessed seizures at work few minutes a part, but patient might have returned back to his baseline mentation in between the seizures. Then during interview in ER, had another bilateral tonic clonic seizure. Once hooked up to cvEEG, right temporal epileptiform discharges were seen. AED promptly started.”\par \par He was discharged home on LEV 1500mg BID. Pt then BIBA to [facility] on 10/8/20 for another focal to BL tonic clonic seizure, witnessed by stevenson. According to patient and his fiance, he ran out of medication and missed the evening dose of LEV. The following day he had two focal to BL tonic clonic seizures, within few minutes of each other, lasting 1.5-2 minutes each. \par \par Patient report adverse effect on LEV. He reports not being able to sleep, diaphoretic, anxious and angry since beginning LEV in September. \par \par SEIZURE DESCRIPTION AND TYPE:\par Type #1\par Severity: focal to bilateral tonic clonic seizure \par Onset: 3/12/19\par Quality & associated signs/symptoms: no aura -> figure of 4 with left arm extension -> full body convulsion, tongue bite, urinary and bowel incontinence -> postictal confusion and lethargy \par Duration: 1-2 mins\par Timing: one on 3/12/19, three on 9/3/20, two on 10/8/20\par Modifying factors: occurred either prior to initiation of AED, or noncompliance with AED\par Diurnal Variation: none\par \par EPILEPSY TYPE: focal epilepsy, unknown etiology\par HISTORY OF TONIC-CLONIC SZ: yes\par HISTORY OF STATUS EPILEPTICUS: no  \par \par SEIZURE RISK FACTORS:\par Patient was a product of normal pregnancy and delivery. No history of febrile seizure, TBI, CNS infection, or FH of seizures.\par \par CURRENT AED:\par LEV 1500mg BID - started 9/4/20, insomnia, diaphoretic, anxious and angry \par \par PREVIOUS AED:\par none\par \par IMAGING: \par MRI head w/wo, 9/3/20 ([facility]): Limited by motion artifact. Visualized hippocampal structures are symmetric in morphology and signal intensity. \par \par MRI brain w/wo 3/13/19 ([facility]): Small right of midline posterior fossa arachnoid cyst.\par \par NEUROPHYSIOLOGY:\par cvEEG 9/3 – 9/4/20 ([facility]): 1) RT sharps; 2) intermittent slowing RT\par \par NEUROPSYCHOLOGY: \par none

## 2020-10-13 NOTE — ASSESSMENT
[FreeTextEntry1] : RENU VALLECILLO is 29 y/o RH man with a history of difficult-to-control HTN since 11yo, questionable thickening of the left adrenal gland up to 1.2cm concerning for pheochromocytoma s/p left adrenalectomy 8/15/19 at [facility] who presents for followup for focal epilepsy characterized by focal to bilateral tonic clonic sz's. MRI unremarkable. EEG shows RT sharps.\par \par Recent seizure due to missing one dose of LEV. Reports side effects from LEV. Will cross titrate to OXC. Thoroughly went over side effects of medication, detailed direction and tapering/titrating schedule regarding medication administration. Patient was educated regarding risks and driving privileges associated with the [place] State Guidelines; patient instructed not to drive. All questions and concerns answered and addressed in detail to patient's and fiancee's complete satisfaction. Patient and fiancee verbalized understanding and agreed to plan.\par \par - cross titration of LEV 750mg tablets for OXC 600mg tablets; following scheduled printed out and provided to pt\par    -Week 1 - LEV 2 tab BID, OXC 1/2 tab BID\par    -Week 2 - LEV 2 tab BID, OXC 1 tab BID\par    -Week 3 - LEV 1 tab BID, OXC 1.5 tab BID\par    -Week 4 - LEV 1/2 tab BID, OXC 1.5 tab BID\par    -Week 5 - LEV 1/2 tab PM, OXC 1.5 tab BID\par    -Week 6 - STOP LEV, OXC 1.5 tab BID\par \par - start Nayzilam PRN for seizures \par - follow-up in 2 months\par - no driving\par \par \par All relevant epilepsy AAN quality care measures were addressed and discussed with the patient and fiancee.\par \par More than 50% of time spent counseling and educating patient about epilepsy specific safety issues including AED side effects and interactions, alcohol consumption, sleep deprivation, risks and driving privileges associated with the [place] Guidelines, death related to seizures/SUDEP, seizure 1st aid and risks. Patient and fiancee are educated on seizure precautions, including no driving, no operating machinery, no swimming or bathing, no climbing heights, or engage in any risky activities during which a seizure could cause further injury to pt or others. \par \par

## 2020-10-13 NOTE — PHYSICAL EXAM
[FreeTextEntry1] : GENERAL PHYSICAL EXAM:\par GEN: obese, no distress, normal affect, \par HEENT: NCAT, OP clear\par EYES: sclera white, conjunctiva clear, no nystagmus\par NECK: supple\par CV: RRR 		\par PULM: CTAB, no wheezing\par GI: soft ABD, +BS, NT, ND\par EXT: peripheral pulse intact, no cyanosis\par MSK: muscle tone and strength normal\par SKIN: warm, diaphoretic, no rash or lesion on exposed skin \par \par NEUROLOGICAL EXAM:\par Mental Status\par Orientation: alert and oriented to person, place, time, and situation \par Language: clear and fluent, intact comprehension and repetition, intact naming and reading\par \par Cranial Nerves\par II: full visual fields intact \par III, IV, VI: PERRL, EOMI\par V, VII: facial sensation and movement intact and symmetric \par VIII: hearing intact \par IX, X: uvula midline, soft palate elevates normally \par XI: BL shoulder shrug intact \par XII: tongue midline\par \par Motor\par Shoulder abd: 5 (R), 5 (L)\par EF/EE: 5 (R), 5 (L)\par WF/WE: 5 (R), 5 (L)\par hand : 5 (R), 5 (L)\par HF/HE: 5 (R), 5 (L)\par KF/KE: 5 (R), 5 (L)\par DF/PF: 5 (R), 5 (L) \par Tone and bulk are normal in upper and lower limbs\par No pronator drift\par \par Sensation\par Intact to light touch in all 4 EXTs\par \par Reflex\par 2+ in BL biceps, brachioradialis, patella\par \par Coordination\par Normal FTN bilaterally\par \par Gait\par Normal stance, stride, and pivot turn\par Tandem walk intact\par Negative Romberg

## 2020-11-20 ENCOUNTER — APPOINTMENT (OUTPATIENT)
Dept: ENDOCRINOLOGY | Facility: CLINIC | Age: 30
End: 2020-11-20

## 2020-12-01 ENCOUNTER — NON-APPOINTMENT (OUTPATIENT)
Age: 30
End: 2020-12-01

## 2020-12-01 ENCOUNTER — APPOINTMENT (OUTPATIENT)
Dept: NEUROLOGY | Facility: CLINIC | Age: 30
End: 2020-12-01

## 2020-12-01 NOTE — HISTORY OF PRESENT ILLNESS
[FreeTextEntry1] : LAST OFFICE VISIT: 10/13/20\par \par PCP: Dr. Anastasiya Gillespie\par Fiancee: Zahida\par \par INTERIM HISTORY:\par Pt today is accompanied by ____ . Entire interview conducted via  service (____ ID#____). At the last visit, \par \par CURRENT AED:\par OXC 1.5 tab BID - started 10/2020\par \par PRIOR EPILEPSY HISTORY:\par 10/13/20: Mr. Barker is a 29 y/o RH man with a history of difficult-to-control HTN since 13yo, questionable thickening of the left adrenal gland up to 1.2cm concerning for pheochromocytoma s/p left adrenalectomy 8/15/19 at [facility] who presents today for post-hospital followup for recently diagnosed epilepsy. He is accompanied today by his fiancée Zahida. \par \par I saw the pt when he was hospitalized at [facility] 9/2 – 9/4/20. My note below:\par “This is a 30y RH Male with a history of difficult-to-control HTN since 13yo, questionable thickening of the left adrenal gland up to 1.2cm concerning for pheochromocytoma s/p left adrenalectomy 8/15/19 at [facility], one prior bilateral tonic clonic seizure on 3/12/19 who presented with 3 bilateral tonic clonic seizures on DOA. All 4 seizures have occurred in SBP in 190s to 240s. Epilepsy consulted to evaluate for epilepsy vs acute provoked symptomatic seizure secondary to HTN emergency.  \par \par First seizure on 3/2/19, witnessed by patient's father. MRI and rEEG were negative. So patient discharged without any antiepileptic medication. Had two witnessed seizures at work few minutes a part, but patient might have returned back to his baseline mentation in between the seizures. Then during interview in ER, had another bilateral tonic clonic seizure. Once hooked up to cvEEG, right temporal epileptiform discharges were seen. AED promptly started.”\par \par He was discharged home on LEV 1500mg BID. Pt then BIBA to [facility] on 10/8/20 for another focal to BL tonic clonic seizure, witnessed by stevenson. According to patient and his fiance, he ran out of medication and missed the evening dose of LEV. The following day he had two focal to BL tonic clonic seizures, within few minutes of each other, lasting 1.5-2 minutes each. \par \par Patient report adverse effect on LEV. He reports not being able to sleep, diaphoretic, anxious and angry since beginning LEV in September. \par \par SEIZURE DESCRIPTION AND TYPE:\par Type #1\par Severity: focal to bilateral tonic clonic seizure \par Onset: 3/12/19\par Quality & associated signs/symptoms: no aura -> figure of 4 with left arm extension -> full body convulsion, tongue bite, urinary and bowel incontinence -> postictal confusion and lethargy \par Duration: 1-2 mins\par Timing: one on 3/12/19, three on 9/3/20, two on 10/8/20\par Modifying factors: occurred either prior to initiation of AED, or noncompliance with AED\par Diurnal Variation: none\par \par EPILEPSY TYPE: focal epilepsy, unknown etiology\par HISTORY OF TONIC-CLONIC SZ: yes\par HISTORY OF STATUS EPILEPTICUS: no  \par \par SEIZURE RISK FACTORS:\par Patient was a product of normal pregnancy and delivery. No history of febrile seizure, TBI, CNS infection, or FH of seizures.\par \par CURRENT AED:\par OXC 1.5 tab BID - started 10/2020\par \par PREVIOUS AED:\par LEV 1500mg BID - started 9/4/20, insomnia, diaphoretic, anxious and angry \par \par IMAGING: \par MRI head w/wo, 9/3/20 ([facility]): Limited by motion artifact. Visualized hippocampal structures are symmetric in morphology and signal intensity. \par \par MRI brain w/wo 3/13/19 ([facility]): Small right of midline posterior fossa arachnoid cyst.\par \par NEUROPHYSIOLOGY:\par cvEEG 9/3 – 9/4/20 ([facility]): 1) RT sharps; 2) intermittent slowing RT\par \par NEUROPSYCHOLOGY: \par none

## 2020-12-01 NOTE — ASSESSMENT
[FreeTextEntry1] : RENU VALLECILLO is 31 y/o RH man with a history of difficult-to-control HTN since 13yo, questionable thickening of the left adrenal gland up to 1.2cm concerning for pheochromocytoma s/p left adrenalectomy 8/15/19 at [facility] who presents for followup for focal epilepsy characterized by focal to bilateral tonic clonic sz's. MRI unremarkable. EEG shows RT sharps.\par \par Recent seizure due to missing one dose of LEV. Reports side effects from LEV. Will cross titrate to OXC. Thoroughly went over side effects of medication, detailed direction and tapering/titrating schedule regarding medication administration. Patient was educated regarding risks and driving privileges associated with the [place] State Guidelines; patient instructed not to drive. All questions and concerns answered and addressed in detail to patient's and fiancee's complete satisfaction. Patient and fiancee verbalized understanding and agreed to plan.\par \par - cross titration of LEV 750mg tablets for OXC 600mg tablets; following scheduled printed out and provided to pt\par    -Week 1 - LEV 2 tab BID, OXC 1/2 tab BID\par    -Week 2 - LEV 2 tab BID, OXC 1 tab BID\par    -Week 3 - LEV 1 tab BID, OXC 1.5 tab BID\par    -Week 4 - LEV 1/2 tab BID, OXC 1.5 tab BID\par    -Week 5 - LEV 1/2 tab PM, OXC 1.5 tab BID\par    -Week 6 - STOP LEV, OXC 1.5 tab BID\par \par - start Nayzilam PRN for seizures \par - follow-up in 2 months\par - no driving\par \par \par All relevant epilepsy AAN quality care measures were addressed and discussed with the patient and fiancee.\par \par More than 50% of time spent counseling and educating patient about epilepsy specific safety issues including AED side effects and interactions, alcohol consumption, sleep deprivation, risks and driving privileges associated with the [place] Guidelines, death related to seizures/SUDEP, seizure 1st aid and risks. Patient and fiancee are educated on seizure precautions, including no driving, no operating machinery, no swimming or bathing, no climbing heights, or engage in any risky activities during which a seizure could cause further injury to pt or others. \par \par

## 2020-12-01 NOTE — REASON FOR VISIT
[Follow-Up: _____] : a [unfilled] follow-up visit [Consultation] : a consultation visit [Other: _____] : [unfilled] [FreeTextEntry1] : epilepsy

## 2020-12-01 NOTE — REVIEW OF SYSTEMS
[As Noted in HPI] : as noted in HPI [Negative] : Genitourinary [de-identified] : Reports diaphoresis

## 2020-12-29 ENCOUNTER — APPOINTMENT (OUTPATIENT)
Dept: CARDIOLOGY | Facility: CLINIC | Age: 30
End: 2020-12-29

## 2020-12-29 NOTE — HISTORY OF PRESENT ILLNESS
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, still with uncontrolled HTN (on amlodipine 10mg, atenolol 50mg, doxazosin 8mg), followed up with Endocrine in 8/2020 pending repeat MIBG scan, presented to [facility] on 9/2/20 after syncope/seizure episode at work along with HTN urgency 202/115, hospitalized until 9/4/20, TTE with pEF 60-65% and LVH, EEG with +epileptiform foci at the  R-temporal region, repeat serum renin/dasia and urine/plasma metanephrine were normal, unable to obtain MIBG scan during hospitalization, added spironolactone for BP control and hypokalemia, presented for initial outpatient cardiology visit seen on 9/2020, referral for bariatric surgery but did not attend, had visit with neurologist with adjustment in antiepileptics, now presents for 3 months follow up.\par \par \par \par Prior visit 9/2020: \par Reports feeling well except recently got fired from his job due to his medical condition, he is applying for security position. Adherent with his medications, not yet obtain BP cuff; pending Neurology and Endocrine follow up; smokes marijuana but no other illicit drugs or alcohol use. Reportedly follow-up with his PMD and had repeat blood work told renal function stable.

## 2020-12-29 NOTE — DISCUSSION/SUMMARY
[facility]     Chief Complaint Chief Complaint   Patient presents with   • Toenail     Possible infected toenail/RIGHT greater toenail ingrown      Date 03/24/20       ASSESSMENT:  1. Right foot pain    2. Ingrown right greater toenail    3. Paronychia of great toe of right foot          PLAN:  · Patient was seen and evaluated today.   · Patient does have a slightly infected ingrown toenail to the lateral right great toe.  · Multiple options were given to the patient today. I did offer the patient local wound care and to observe. Other options would include removal of the nail with a slant back procedure, an avulsion procedure, or a matrixectomy. Patient has elected for partial matrixectomy right great toe.  · Patient was consented. Risk and benefits were explained to the patient which included but not limited to recurrence, infection, wound complications, and loss of limb.  · Postprocedure wound care instructions were given to the patient in verbal and written form. Wound care supplies were also given to the patient. If there is any more redness or signs of infection, patient is to follow up with me sooner or go to the emergency department.  · Patient can follow-up in 2 weeks.  · Patient understands and is in agreement with the treatment plan. All questions were answered to the patients level of satisfaction.     Procedure Note:   The right great toe was sterilely anesthetized with 3cc of 0.5% Marcaine plain and 1% Lidocaine plain in a proximal field block technique. The foot was then draped and prepped in typical aseptic technique.  A standard matrixectomy procedure was performed on the lateral nail border, including removal of offending nail border, debridement of hypergranular tissue and granuloma within this area.  3 applications of 89% phenol was then applied for 30 seconds each with a cotton tip applicator. 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, still with uncontrolled HTN (on amlodipine 10mg, atenolol 50mg, doxazosin 8mg), followed up with Endocrine in 8/2020 pending repeat MIBG scan, presented to [facility] on 9/2/20 after syncope/seizure episode at work along with HTN urgency 202/115, hospitalized until 9/4/20, TTE with pEF 60-65% and LVH, EEG with +epileptiform foci at the  R-temporal region, repeat serum renin/dasia and urine/plasma metanephrine were normal, unable to obtain MIBG scan during hospitalization, added spironolactone for BP control and hypokalemia, presented for initial outpatient cardiology visit seen on 9/2020, referral for bariatric surgery but did not attend, had visit with neurologist with adjustment in antiepileptics, now presents for 3 months follow up.\par \par \par  \par \par 1. Resistant HTN- secondary workup thus far negative, pending Endocrine follow up for MIBG scan; BP well controlled on current regimen. \par \par 2. Morbid obesity- along with HTN- referral for bariatric surgery evaluation. \par \par 3. Seizure disorder- continue AED, neurology follow up. \par \par 4. Recent syncope- in setting of seizure and HTN urgency; defer Holter/Event monitoring for now. \par \par \par Follow up in 3 months.  application of antibiotic ointment and gauze dressing and Coban.        Orders Placed This Encounter   • cephalexin (KEFLEX) 500 MG capsule       I did advise the patient to Return in about 2 weeks (around 4/7/2020).    Thank you for allowing me to participate in your foot and ankle needs. If you have any questions please feel free to contact my office [phone] or message me on [employer].     Abram Angeles DPM      HISTORY OF PRESENT ILLNESS:  Ms. Herrera is a pleasant 28 year old female who presents to the clinic today with concerns of pain to the right great toe.  Patient states that this is been going on for 1.5 weeks.  Patient states fully getting worse.  She states that she does struggle with ingrown toenails.  She has been having these rates.  She states that she recently tried to remove her ingrown toenail.  Patient states that it has been getting red and more painful.  She also discuss this that is more red as well as draining clear fluid.  Patient states that at rest her pain is 0/10 however it can increase to a 3/10 with pressure.  She has tried triple antibiotic ointment this has not helped.  She denies any other complaints at this time. Patient currently denies any nausea, vomiting, fever, chills or shortness of breath.    ALLERGIES:  No Known Allergies    Past Medical History:   Diagnosis Date   • Anxiety    • Attention deficit disorder    • Depression        No family history on file.    Social History     Socioeconomic History   • Marital status: Single     Spouse name: Not on file   • Number of children: Not on file   • Years of education: Not on file   • Highest education level: Not on file   Occupational History   • Not on file   Social Needs   • Financial resource strain: Not on file   • Food insecurity:     Worry: Not on file     Inability: Not on file   • Transportation needs:     Medical: Not on file     Non-medical: Not on file   Tobacco Use   • Smoking status: Never Smoker   • Smokeless  tobacco: Never Used   Substance and Sexual Activity   • Alcohol use: No   • Drug use: No   • Sexual activity: Not on file   Lifestyle   • Physical activity:     Days per week: Not on file     Minutes per session: Not on file   • Stress: Not on file   Relationships   • Social connections:     Talks on phone: Not on file     Gets together: Not on file     Attends Hoahaoism service: Not on file     Active member of club or organization: Not on file     Attends meetings of clubs or organizations: Not on file     Relationship status: Not on file   • Intimate partner violence:     Fear of current or ex partner: Not on file     Emotionally abused: Not on file     Physically abused: Not on file     Forced sexual activity: Not on file   Other Topics Concern   • Not on file   Social History Narrative   • Not on file       Review of Systems:  Constitutional: Negative for fever, chills, nausea, vomiting or unexpected weight loss.   Musculoskeletal: No new muscle joint ligament or bone aches, pains, limitations or abnormalities    PHYSICAL EXAMINATION:  Visit Vitals  Temp 96.9 °F (36.1 °C)   Ht 5' 2.5\" (1.588 m)   Wt 55.5 kg   BMI 22.02 kg/m²       General:  Well-groomed, well-dressed, no acute distress, alert and orientated x 3, mood and affect are normal   Integument: Ingrown toenail noted at the lateral right great toe, there is hypergranular tissue with paronychia right great toe, serous drainage, global skin texture turgor temperature is normal bilateral feet ingrown toenail noted with undo that  Vascular: Dorsalis pedis 2/4 bilateral,  Posterior tibial pulses are 2/4 bilaterally, Capillary Refill time is < 3 seconds to all digits  Neurologic:  Gross sensation is intact bilaterally, Epicritic sensation is intact bilaterally, Achilles tendon reflex intact bilaterally  Musculoskeletal: Manual Muscle Test is 5/5 in all 4 quadrants, ROM of ankle joint is fluid, DF is limited with knee extended and flexed, subtalar joint  inversion eversion is fluid no crepitus no pain. Pain with palpation at the right great toe

## 2021-01-04 ENCOUNTER — NON-APPOINTMENT (OUTPATIENT)
Age: 31
End: 2021-01-04

## 2021-01-08 ENCOUNTER — APPOINTMENT (OUTPATIENT)
Dept: NEUROLOGY | Facility: CLINIC | Age: 31
End: 2021-01-08

## 2021-01-22 RX ORDER — LEVETIRACETAM 750 MG/1
750 TABLET, FILM COATED ORAL
Qty: 120 | Refills: 0 | Status: DISCONTINUED | COMMUNITY
Start: 2020-09-04 | End: 2021-01-22

## 2021-01-22 RX ORDER — LEVETIRACETAM 750 MG/1
750 TABLET, FILM COATED ORAL TWICE DAILY
Qty: 120 | Refills: 1 | Status: DISCONTINUED | COMMUNITY
Start: 2020-10-13 | End: 2021-01-22

## 2021-01-25 ENCOUNTER — APPOINTMENT (OUTPATIENT)
Dept: NEUROLOGY | Facility: CLINIC | Age: 31
End: 2021-01-25

## 2021-01-25 ENCOUNTER — NON-APPOINTMENT (OUTPATIENT)
Age: 31
End: 2021-01-25

## 2021-01-25 NOTE — CONSULT LETTER
[FreeTextEntry1] : I had the pleasure of seeing your patient, RENU VALLECILLO, in my office today. Please see my note below. \par \par If you have any questions, please do not hesitate to contact me.  [FreeTextEntry3] : Cecilia Hyatt MD\par [facility]\par [facility] [place]\par [address] [place] [address]\par Phone: [phone]; Fax: [phone]\par \par

## 2021-01-25 NOTE — HISTORY OF PRESENT ILLNESS
[FreeTextEntry1] : LAST OFFICE VISIT: 10/13/20\par \par PCP: Dr. Anastasiya Gillespie\par Kristina: Zahida\par \par \par INTERIM HISTORY:\par Pt today is accompanied by ____ . Entire interview conducted via  service (____ ID#____). At the last visit, \par \par CURRENT AED:\par \par \par \par PRIOR EPILEPSY HISTORY:\par 10/13/20:  Mr. Barker is a 31 y/o RH man with a history of difficult-to-control HTN since 11yo, questionable thickening of the left adrenal gland up to 1.2cm concerning for pheochromocytoma s/p left adrenalectomy 8/15/19 at [facility] who presents today for post-hospital followup for recently diagnosed epilepsy. He is accompanied today by his fiancée Zahida. \par \par I saw the pt when he was hospitalized at [facility] 9/2 – 9/4/20. My note below:\par “This is a 30y RH Male with a history of difficult-to-control HTN since 11yo, questionable thickening of the left adrenal gland up to 1.2cm concerning for pheochromocytoma s/p left adrenalectomy 8/15/19 at [facility], one prior bilateral tonic clonic seizure on 3/12/19 who presented with 3 bilateral tonic clonic seizures on DOA. All 4 seizures have occurred in SBP in 190s to 240s. Epilepsy consulted to evaluate for epilepsy vs acute provoked symptomatic seizure secondary to HTN emergency.  \par \par First seizure on 3/2/19, witnessed by patient's father. MRI and rEEG were negative. So patient discharged without any antiepileptic medication. Had two witnessed seizures at work few minutes a part, but patient might have returned back to his baseline mentation in between the seizures. Then during interview in ER, had another bilateral tonic clonic seizure. Once hooked up to cvEEG, right temporal epileptiform discharges were seen. AED promptly started.”\par \par He was discharged home on LEV 1500mg BID. Pt then BIBA to [facility] on 10/8/20 for another focal to BL tonic clonic seizure, witnessed by stevenson. According to patient and his fiance, he ran out of medication and missed the evening dose of LEV. The following day he had two focal to BL tonic clonic seizures, within few minutes of each other, lasting 1.5-2 minutes each. \par \par Patient report adverse effect on LEV. He reports not being able to sleep, diaphoretic, anxious and angry since beginning LEV in September. \par \par SEIZURE DESCRIPTION AND TYPE:\par Type #1\par Severity: focal to bilateral tonic clonic seizure \par Onset: 3/12/19\par Quality & associated signs/symptoms: no aura -> figure of 4 with left arm extension -> full body convulsion, tongue bite, urinary and bowel incontinence -> postictal confusion and lethargy \par Duration: 1-2 mins\par Timing: one on 3/12/19, three on 9/3/20, two on 10/8/20\par Modifying factors: occurred either prior to initiation of AED, or noncompliance with AED\par Diurnal Variation: none\par \par EPILEPSY TYPE: focal epilepsy, unknown etiology\par HISTORY OF TONIC-CLONIC SZ: yes\par HISTORY OF STATUS EPILEPTICUS: no  \par \par SEIZURE RISK FACTORS:\par Patient was a product of normal pregnancy and delivery. No history of febrile seizure, TBI, CNS infection, or FH of seizures.\par \par CURRENT AED:\par OXC 1.5 tab BID, started 10/2020\par Nayzilam PRN for seizures, started 10/2020\par \par \par PREVIOUS AED:\par LEV 1500mg BID - started 9/4/20, insomnia, diaphoretic, anxious and angry, discontinued 10/2020\par none\par \par IMAGING: \par MRI head w/wo, 9/3/20 ([facility]): Limited by motion artifact. Visualized hippocampal structures are symmetric in morphology and signal intensity. \par \par MRI brain w/wo 3/13/19 ([facility]): Small right of midline posterior fossa arachnoid cyst.\par \par NEUROPHYSIOLOGY:\par cvEEG 9/3 – 9/4/20 ([facility]): 1) RT sharps; 2) intermittent slowing RT\par \par NEUROPSYCHOLOGY: \par none

## 2021-01-25 NOTE — ASSESSMENT
[FreeTextEntry1] : RENU VALLECILLO is 29 y/o RH man with a history of difficult-to-control HTN since 13yo, questionable thickening of the left adrenal gland up to 1.2cm concerning for pheochromocytoma s/p left adrenalectomy 8/15/19 at [facility] who presents for followup for focal epilepsy characterized by focal to bilateral tonic clonic sz's. MRI unremarkable. EEG shows RT sharps.\par \par Recent seizure due to missing one dose of LEV. Reports side effects from LEV. Will cross titrate to OXC. Thoroughly went over side effects of medication, detailed direction and tapering/titrating schedule regarding medication administration. Patient was educated regarding risks and driving privileges associated with the [place] State Guidelines; patient instructed not to drive. All questions and concerns answered and addressed in detail to patient's and fiancee's complete satisfaction. Patient and fiancee verbalized understanding and agreed to plan.\par \par - cross titration of LEV 750mg tablets for OXC 600mg tablets; following scheduled printed out and provided to pt\par    -Week 1 - LEV 2 tab BID, OXC 1/2 tab BID\par    -Week 2 - LEV 2 tab BID, OXC 1 tab BID\par    -Week 3 - LEV 1 tab BID, OXC 1.5 tab BID\par    -Week 4 - LEV 1/2 tab BID, OXC 1.5 tab BID\par    -Week 5 - LEV 1/2 tab PM, OXC 1.5 tab BID\par    -Week 6 - STOP LEV, OXC 1.5 tab BID\par \par - start Nayzilam PRN for seizures \par - follow-up in 2 months\par - no driving\par \par \par All relevant epilepsy AAN quality care measures were addressed and discussed with the patient and fiancee.\par \par More than 50% of time spent counseling and educating patient about epilepsy specific safety issues including AED side effects and interactions, alcohol consumption, sleep deprivation, risks and driving privileges associated with the [place] Guidelines, death related to seizures/SUDEP, seizure 1st aid and risks. Patient and fiancee are educated on seizure precautions, including no driving, no operating machinery, no swimming or bathing, no climbing heights, or engage in any risky activities during which a seizure could cause further injury to pt or others. \par \par

## 2021-03-12 ENCOUNTER — EMERGENCY (EMERGENCY)
Facility: HOSPITAL | Age: 31
LOS: 1 days | Discharge: HOME | End: 2021-03-12
Attending: EMERGENCY MEDICINE
Payer: COMMERCIAL

## 2021-03-12 ENCOUNTER — NON-APPOINTMENT (OUTPATIENT)
Age: 31
End: 2021-03-12

## 2021-03-12 VITALS
OXYGEN SATURATION: 97 % | DIASTOLIC BLOOD PRESSURE: 68 MMHG | HEIGHT: 72 IN | HEART RATE: 91 BPM | SYSTOLIC BLOOD PRESSURE: 145 MMHG | RESPIRATION RATE: 16 BRPM | TEMPERATURE: 98 F | WEIGHT: 315 LBS

## 2021-03-12 LAB
ALBUMIN SERPL ELPH-MCNC: 3.8 G/DL — SIGNIFICANT CHANGE UP (ref 3.3–5.2)
ALP SERPL-CCNC: 44 U/L — SIGNIFICANT CHANGE UP (ref 40–120)
ALT FLD-CCNC: 29 U/L — SIGNIFICANT CHANGE UP
ANION GAP SERPL CALC-SCNC: 11 MMOL/L — SIGNIFICANT CHANGE UP (ref 5–17)
AST SERPL-CCNC: 33 U/L — SIGNIFICANT CHANGE UP
BASOPHILS # BLD AUTO: 0.03 K/UL — SIGNIFICANT CHANGE UP (ref 0–0.2)
BASOPHILS NFR BLD AUTO: 0.3 % — SIGNIFICANT CHANGE UP (ref 0–2)
BILIRUB SERPL-MCNC: 0.3 MG/DL — LOW (ref 0.4–2)
BUN SERPL-MCNC: 16 MG/DL — SIGNIFICANT CHANGE UP (ref 8–20)
CALCIUM SERPL-MCNC: 8.7 MG/DL — SIGNIFICANT CHANGE UP (ref 8.6–10.2)
CHLORIDE SERPL-SCNC: 103 MMOL/L — SIGNIFICANT CHANGE UP (ref 98–107)
CO2 SERPL-SCNC: 23 MMOL/L — SIGNIFICANT CHANGE UP (ref 22–29)
CREAT SERPL-MCNC: 1.03 MG/DL — SIGNIFICANT CHANGE UP (ref 0.5–1.3)
EOSINOPHIL # BLD AUTO: 0.14 K/UL — SIGNIFICANT CHANGE UP (ref 0–0.5)
EOSINOPHIL NFR BLD AUTO: 1.2 % — SIGNIFICANT CHANGE UP (ref 0–6)
GLUCOSE SERPL-MCNC: 164 MG/DL — HIGH (ref 70–99)
HCT VFR BLD CALC: 37.9 % — LOW (ref 39–50)
HGB BLD-MCNC: 13.9 G/DL — SIGNIFICANT CHANGE UP (ref 13–17)
IMM GRANULOCYTES NFR BLD AUTO: 0.5 % — SIGNIFICANT CHANGE UP (ref 0–1.5)
LYMPHOCYTES # BLD AUTO: 0.54 K/UL — LOW (ref 1–3.3)
LYMPHOCYTES # BLD AUTO: 4.7 % — LOW (ref 13–44)
MCHC RBC-ENTMCNC: 31.7 PG — SIGNIFICANT CHANGE UP (ref 27–34)
MCHC RBC-ENTMCNC: 36.7 GM/DL — HIGH (ref 32–36)
MCV RBC AUTO: 86.5 FL — SIGNIFICANT CHANGE UP (ref 80–100)
MONOCYTES # BLD AUTO: 0.65 K/UL — SIGNIFICANT CHANGE UP (ref 0–0.9)
MONOCYTES NFR BLD AUTO: 5.6 % — SIGNIFICANT CHANGE UP (ref 2–14)
NEUTROPHILS # BLD AUTO: 10.16 K/UL — HIGH (ref 1.8–7.4)
NEUTROPHILS NFR BLD AUTO: 87.7 % — HIGH (ref 43–77)
PLATELET # BLD AUTO: 174 K/UL — SIGNIFICANT CHANGE UP (ref 150–400)
POTASSIUM SERPL-MCNC: 5.5 MMOL/L — HIGH (ref 3.5–5.3)
POTASSIUM SERPL-SCNC: 5.5 MMOL/L — HIGH (ref 3.5–5.3)
PROT SERPL-MCNC: 7 G/DL — SIGNIFICANT CHANGE UP (ref 6.6–8.7)
RBC # BLD: 4.38 M/UL — SIGNIFICANT CHANGE UP (ref 4.2–5.8)
RBC # FLD: 12.1 % — SIGNIFICANT CHANGE UP (ref 10.3–14.5)
SODIUM SERPL-SCNC: 137 MMOL/L — SIGNIFICANT CHANGE UP (ref 135–145)
WBC # BLD: 11.58 K/UL — HIGH (ref 3.8–10.5)
WBC # FLD AUTO: 11.58 K/UL — HIGH (ref 3.8–10.5)

## 2021-03-12 PROCEDURE — 85025 COMPLETE CBC W/AUTO DIFF WBC: CPT

## 2021-03-12 PROCEDURE — 99284 EMERGENCY DEPT VISIT MOD MDM: CPT

## 2021-03-12 PROCEDURE — 80053 COMPREHEN METABOLIC PANEL: CPT

## 2021-03-12 PROCEDURE — 36415 COLL VENOUS BLD VENIPUNCTURE: CPT

## 2021-03-12 PROCEDURE — 80183 DRUG SCRN QUANT OXCARBAZEPIN: CPT

## 2021-03-12 PROCEDURE — 99283 EMERGENCY DEPT VISIT LOW MDM: CPT

## 2021-03-12 RX ORDER — SODIUM CHLORIDE 9 MG/ML
3 INJECTION INTRAMUSCULAR; INTRAVENOUS; SUBCUTANEOUS ONCE
Refills: 0 | Status: COMPLETED | OUTPATIENT
Start: 2021-03-12 | End: 2021-03-12

## 2021-03-12 RX ORDER — OXCARBAZEPINE 300 MG/1
1200 TABLET, FILM COATED ORAL ONCE
Refills: 0 | Status: COMPLETED | OUTPATIENT
Start: 2021-03-12 | End: 2021-03-12

## 2021-03-12 RX ADMIN — OXCARBAZEPINE 1200 MILLIGRAM(S): 300 TABLET, FILM COATED ORAL at 20:55

## 2021-03-12 RX ADMIN — SODIUM CHLORIDE 3 MILLILITER(S): 9 INJECTION INTRAMUSCULAR; INTRAVENOUS; SUBCUTANEOUS at 20:00

## 2021-03-12 NOTE — ED PROVIDER NOTE - ENMT, MLM
Airway patent, Nasal mucosa clear. Mouth with normal mucosa. Throat has no vesicles, no oropharyngeal exudates and uvula is midline, neck supple, no JVD

## 2021-03-12 NOTE — ED PROVIDER NOTE - DISCHARGE DATE
1412 Dr Kandace Kasper at bedside updated pt on procedure instructed pt to take  twice a day added to dc instructions 12-Mar-2021

## 2021-03-12 NOTE — ED ADULT NURSE NOTE - OBJECTIVE STATEMENT
Pt is A&Ox4, in NAD. Presents to ED s/p seizure at home today. Pt states he was feeling unwell this morning so he did not take his seizure medication. Pt states he had a witnessed seizure around 1pm. Denies hitting his head. Pt neuro exam WNL. ED workup in progress. Will continue to monitor.

## 2021-03-12 NOTE — ED PROVIDER NOTE - PROGRESS NOTE DETAILS
Felipe QIU for ED attending, Dr. Atwood. Spoke to fellow of Dr. Hyatt, they request to give patient a 1200mg dose of Trileptal, and continue to take 900mg twice a day at home Labs as noted.  Pt remains awake and alert, ambulatory in ED without any c/o and is stable for d/c with Neuro f/u as outpt

## 2021-03-12 NOTE — ED ADULT TRIAGE NOTE - CHIEF COMPLAINT QUOTE
witnessed seizure. has history of seizures. a and o x3 at this time. reports nausea. no other complaints. breathing even and unlabored.

## 2021-03-12 NOTE — ED PROVIDER NOTE - MUSCULOSKELETAL, MLM
Spine appears normal, range of motion is not limited, no muscle or joint tenderness, no signs of cyanosis or edema

## 2021-03-12 NOTE — ED PROVIDER NOTE - NSFOLLOWUPINSTRUCTIONS_ED_ALL_ED_FT
Continue your medications as instructed  Follow up with Dr. Hyatt-call to schedule appt  Return sooner for any problems      Seizure    A seizure is abnormal electrical activity in the brain; the specific cause may or may not be found. Prior to a seizure you may experience a warning sensation (aura) that may include fear, nausea, dizziness, and visual changes such as flashing lights of spots. Common symptoms during the seizure may include an altered mental status, rhythmic jerking movements, drooling, grunting, loss of bladder or bowel control, or tongue biting. After a seizure, you may feel confused and sleepy.     Do not swim, drive, operate machinery, or engage in any risky activity during which a seizure could cause further injury to you or others. Teach friends and family what to do if you HAVE a seizure which includes laying you on the ground with your head on a cushion and turning you to the side to keep your breathing passages clear in case of vomiting.    SEEK IMMEDIATE MEDICAL CARE IF YOU HAVE ANY OF THE FOLLOWING SYMPTOMS: seizure lasting over 5 minutes, not waking up or persistent altered mental status after the seizure, or more frequent or worsening seizures.

## 2021-03-12 NOTE — ED PROVIDER NOTE - OBJECTIVE STATEMENT
31 y/o male with PMHx of HTN and Seizures on Trileptal 1800mg changed from Keppra about 6 months ago presents to ED c/o seizures. Patient reports he had a seizure, and took his morning dose later than usual because he woke up late. Last seizure was about 6 months ago, uses Marijuana.     Denies alcohol use, chest pain, SOB, abdominal pain, urinary incontinence  PMD: Dr. Gillespie; Epilepsy Specialist: Dr. Hyatt

## 2021-03-12 NOTE — ED PROVIDER NOTE - PATIENT PORTAL LINK FT
You can access the FollowMyHealth Patient Portal offered by [facility] by registering at the following website: [email or web address]. By joining [email or web address]’s FollowMyHealth portal, you will also be able to view your health information using other applications (apps) compatible with our system.

## 2021-03-18 LAB — OXCARBAZEPINE SERPL-MCNC: 20 UG/ML — SIGNIFICANT CHANGE UP (ref 10–35)

## 2021-03-24 ENCOUNTER — NON-APPOINTMENT (OUTPATIENT)
Age: 31
End: 2021-03-24

## 2021-03-26 ENCOUNTER — APPOINTMENT (OUTPATIENT)
Dept: NEUROLOGY | Facility: CLINIC | Age: 31
End: 2021-03-26
Payer: MEDICAID

## 2021-03-26 VITALS
BODY MASS INDEX: 42.66 KG/M2 | SYSTOLIC BLOOD PRESSURE: 156 MMHG | OXYGEN SATURATION: 95 % | DIASTOLIC BLOOD PRESSURE: 104 MMHG | TEMPERATURE: 98.2 F | HEART RATE: 73 BPM | WEIGHT: 315 LBS | HEIGHT: 72 IN

## 2021-03-26 PROCEDURE — 99072 ADDL SUPL MATRL&STAF TM PHE: CPT

## 2021-03-26 PROCEDURE — 99214 OFFICE O/P EST MOD 30 MIN: CPT

## 2021-03-26 RX ORDER — MIDAZOLAM 5 MG/.1ML
5 SPRAY NASAL
Qty: 1 | Refills: 5 | Status: ACTIVE | COMMUNITY
Start: 2020-10-13 | End: 1900-01-01

## 2021-03-29 NOTE — CONSULT LETTER
[Dear  ___] : Dear  [unfilled], [Sincerely,] : Sincerely, [FreeTextEntry1] : I had the pleasure of seeing your patient, RENU VALLECILLO, in my office today. Please see my note below. \par \par If you have any questions, please do not hesitate to contact me.  [FreeTextEntry3] : Cecilia Hyatt MD\par [facility]\par [facility] [place]\par [address] [place] [address]\par Phone: [phone]; Fax: [phone]\par \par

## 2021-03-29 NOTE — ASSESSMENT
[FreeTextEntry1] : RENU VALLECILLO is 30 y/o RH man with a history of difficult-to-control HTN since 11yo, questionable thickening of the left adrenal gland up to 1.2cm concerning for pheochromocytoma s/p left adrenalectomy 8/15/19 at [facility] who presents for followup for focal epilepsy characterized by focal to bilateral tonic clonic sz's. MRI unremarkable. EEG shows RT sharps.\par \par Few breakthrough seizures d/t noncompliance. Educated and emphasized importance of med compliance. All questions and concerns answered and addressed in detail to patient's and fiancee's complete satisfaction. Patient and fiancee verbalized understanding and agreed to plan.\par \par - continue OXC 900mg BID\par - CBC, CMP, OXC trough level\par - continue Nayzilam PRN for seizures \par - no driving\par - follow-up in early May: consider EMU vs aEEG\par \par \par All relevant epilepsy AAN quality care measures were addressed and discussed with the patient and fiancee.\par \par More than 50% of time spent counseling and educating patient about epilepsy specific safety issues including AED side effects and interactions, alcohol consumption, sleep deprivation, risks and driving privileges associated with the [place] Guidelines, death related to seizures/SUDEP, seizure 1st aid and risks. Patient and fiancee are educated on seizure precautions, including no driving, no operating machinery, no swimming or bathing, no climbing heights, or engage in any risky activities during which a seizure could cause further injury to pt or others. \par \par

## 2021-03-29 NOTE — ED PROVIDER NOTE - EYES NEGATIVE STATEMENT, MLM
PATIENT INFORMATION    Anticipatory guidance discussed  Bicycle helmets  Chores and other responsibilities  Discipline issues: limit-setting, positive reinforcement  Fluoride supplementation if unfluoridated water supply  Importance of regular dental care  Importance of regular exercise  Importance of varied diet  Library card; limit TV, media violence  Minimize junk food  Safe storage of any firearms in the home  Seat belts; don't put in front seat  Skim or lowfat milk best  Smoke detectors; home fire drills  Teach child how to deal with strangers  Teaching pedestrian safety    Follow-Up  - Return for your yearly well child visit.    7-8 years old Health and Safety Tips - The following hyperlinks are available to access via [employer]    Parent Education from [employer]    Educación para padres sobre niños sanos    Additional Educational Resources:  For additional resources regarding your symptoms, diagnosis, or further health information, please visit the Discover a Healthier You section on [email or web address] or the Online Health Resources section in [employer].   no discharge, no irritation, no pain, no redness, and no visual changes.

## 2021-03-29 NOTE — HISTORY OF PRESENT ILLNESS
[FreeTextEntry1] : LAST OFFICE VISIT: 10/13/20\par \par PCP: Dr. Anastasiya Gillespie\par Kristina: Zahida\par \par INTERIM HISTORY:\par Pt today is accompanied by Zahida gamino. At the last visit, LEV was switched to OXC due to side effect. However, med noncompliant has led to few breakthrough seizures. Last sz 3/12/21. No sz as long as pt is compliant. \par \par CURRENT AED:\par OXC 900mg BID – started 10/2020\par Nayzilam PRN for seizures – started 10/2020\par \par \par PRIOR EPILEPSY HISTORY:\par 10/13/20: Mr. Barker is a 31 y/o RH man with a history of difficult-to-control HTN since 11yo, questionable thickening of the left adrenal gland up to 1.2cm concerning for pheochromocytoma s/p left adrenalectomy 8/15/19 at [facility] who presents today for post-hospital followup for recently diagnosed epilepsy. He is accompanied today by his stevenson Teague. \par \par I saw the pt when he was hospitalized at [facility] 9/2 – 9/4/20. My note below:\par “This is a 30y RH Male with a history of difficult-to-control HTN since 11yo, questionable thickening of the left adrenal gland up to 1.2cm concerning for pheochromocytoma s/p left adrenalectomy 8/15/19 at [facility], one prior bilateral tonic clonic seizure on 3/12/19 who presented with 3 bilateral tonic clonic seizures on DOA. All 4 seizures have occurred in SBP in 190s to 240s. Epilepsy consulted to evaluate for epilepsy vs acute provoked symptomatic seizure secondary to HTN emergency.  \par \par First seizure on 3/2/19, witnessed by patient's father. MRI and rEEG were negative. So patient discharged without any antiepileptic medication. Had two witnessed seizures at work few minutes a part, but patient might have returned back to his baseline mentation in between the seizures. Then during interview in ER, had another bilateral tonic clonic seizure. Once hooked up to cvEEG, right temporal epileptiform discharges were seen. AED promptly started.”\par \par He was discharged home on LEV 1500mg BID. Pt then BIBA to [facility] on 10/8/20 for another focal to BL tonic clonic seizure, witnessed by godwine. According to patient and his fiance, he ran out of medication and missed the evening dose of LEV. The following day he had two focal to BL tonic clonic seizures, within few minutes of each other, lasting 1.5-2 minutes each. \par \par Patient report adverse effect on LEV. He reports not being able to sleep, diaphoretic, anxious and angry since beginning LEV in September. CURRENT AED: LEV 1500mg BID - started 9/4/20, insomnia, diaphoretic, anxious and angry.\par \par \par SEIZURE DESCRIPTION AND TYPE:\par Type #1\par Severity: focal to bilateral tonic clonic seizure \par Onset: 3/12/19\par Quality & associated signs/symptoms: no aura -> figure of 4 with left arm extension -> full body convulsion, tongue bite, urinary and bowel incontinence -> postictal confusion and lethargy \par Duration: 1-2 mins\par Timing: one on 3/12/19, three on 9/3/20, two on 10/8/20, 3/12/21\par Modifying factors: occurred either prior to initiation of AED, or noncompliance with AED\par Diurnal Variation: none\par \par EPILEPSY TYPE: focal epilepsy\par HISTORY OF TONIC-CLONIC SZ: yes\par HISTORY OF STATUS EPILEPTICUS: no  \par \par SEIZURE RISK FACTORS:\par Patient was a product of normal pregnancy and delivery. No history of febrile seizure, TBI, CNS infection, or FH of seizures.\par \par PREVIOUS AED:\par LEV 1500mg BID - 9/4/20 to 12/2020, insomnia, diaphoretic, anxious and angry \par \par IMAGING: \par MRI head w/wo, 9/3/20 ([facility]): Limited by motion artifact. Visualized hippocampal structures are symmetric in morphology and signal intensity. \par \par MRI brain w/wo 3/13/19 ([facility]): Small right of midline posterior fossa arachnoid cyst.\par \par NEUROPHYSIOLOGY:\par cvEEG 9/3 – 9/4/20 ([facility]): 1) RT sharps; 2) intermittent slowing RT\par \par NEUROPSYCHOLOGY: \par none

## 2021-04-29 ENCOUNTER — APPOINTMENT (OUTPATIENT)
Dept: NEUROLOGY | Facility: CLINIC | Age: 31
End: 2021-04-29

## 2021-06-16 NOTE — PATIENT PROFILE ADULT - PUBLIC BENEFITS
Subjective:      Patient ID: Wei Barbour Jr is a 8 y.o. male.    Chief Complaint:    HPI Wei Barbour Jr is a 8 y.o. male who presents today complaining of laceration on left pinky toe x >40 hours.  Patient is up-to-date on tetanus. Patient was playing on exercise equipment and cut the space on the base of the toe. Patient is now having pain when walking. He has not had any redness or fever.        Social History   Substance Use Topics     Smoking status: Never Smoker     Smokeless tobacco: Never Used     Alcohol use None       Review of Systems   Constitutional: Negative for fever.   Skin: Positive for wound.   All other systems reviewed and are negative.      Objective:     Pulse 76  Temp 97.9  F (36.6  C) (Oral)   Resp 16  Wt 72 lb (32.7 kg)  SpO2 100%    Physical Exam   Constitutional: He appears well-developed and well-nourished. He is active. No distress.   HENT:   Nose: No nasal discharge.   Pulmonary/Chest: Effort normal. No respiratory distress.   Neurological: He is alert.   Skin: He is not diaphoretic.   Eclipse shape laceration present on the webbing and base of the left pinky toe approximately 0.9 cm in diameter.  Does not appear to have any tendon involvement.  No secondary signs of infection.       Clinical Decision Making:  I did discuss this case with Dr. Fowler who recommended washout him leaving open.  He also recommended calling [facility] to confer with them.  The provider I spoke to at [facility] recommended starting on antibiotic, cleaning out, and following up with possibly podiatry.  Patient was scheduled with podiatry over the next few days.  He was started on Augmentin.  His wound was washed out with sterile water.  There were no signs of infection present at this time.  Assessment:     Laceration repair  Date/Time: 2/21/2018 12:16 PM  Performed by: ARIES DICK  Authorized by: AIRES DICK   Body area: lower extremity  Location  details: left little toe  Laceration length: 0.9 cm  Foreign bodies: no foreign bodies  Tendon involvement: none  Nerve involvement: none  Vascular damage: no  Anesthesia: local infiltration    Anesthesia:  Local Anesthetic: lidocaine 1% without epinephrine  Anesthetic total: 2 mL    Sedation:  Patient sedated: no  Irrigation solution: Sterile water.  Irrigation method: jet lavage  Amount of cleaning: extensive  Debridement: none  Degree of undermining: none  Comments: Wound left opened for secondary closure due delayed time for seeking care.  After thorough irrigation a 2 x 2 gauze was placed between the toes and the toes were josé luis taped together to limit movement.           1. Laceration of toe of left foot  amoxicillin-clavulanate (AUGMENTIN) 600-42.9 mg/5 mL suspension    Ambulatory referral to Podiatry         Patient Instructions   1.  Take antibiotic according to bottle directions with food.  2.  Keep the foot dry for the next few days.  3.  Follow-up with podiatry over the next couple of days for reevaluation.  4.  Keep the 2 toes bandaged together to minimize movement.  He can walk on the foot, but he should not be running.        no

## 2021-07-03 NOTE — PROGRESS NOTE ADULT - ATTENDING COMMENTS
Patient seen and examined by me.  I have discussed my recommendation with the PA which are outlined above.  Will follow.
Patient seen and examined with resident. Resting comfortably. SPB still 170s, DPB ~100. Increasing doses of doxazosin, ACE inhibitor just started.  CT reviewed. Possibly enlarged left adrenal gland. Pheo biochemical workup pending. Will d/w Dr. Jaramillo consideration for MIBG localization study and/or renal artery dupplex to r/o renal arterial hypertension.
Patient seen and examined.  Cardene gtt + doxazosin 2 mg ->BP controlled systolic 120-130s  Would increase doxazosin until cardene can be tapered off.  Plan for at least 2 weeks of alpha blockade   Add propranolol only if patient becomes or remains tachycardic.
Seen and examined.      NAD  AAOx3, non focal  non labored resp  RRR    Weaned off Cardizem drip.  Increased alpha blockade.  hold beta blocker for now until clear that alpha blockade complete.  F/u urine metanephron.  Will continue to monitor in SICU to ensure adequate BP control.
Seen and examined.      NAD  Regular tachycardia.  AAox4, non focal, normal gait.  non labored resp    Has remained off Cardene infusion.  Still with spikes of BP over 180.  Will increase alpha blockade.  Now with orthostatic tachycardia.  Consider adding beta blockade.  Will discuss with surg onc.
Seen and examined.     NAD  AAox4, non focal, normal gait.  Mild regular tachycardia  non labored resp    No symptoms but HTN remains difficult to control.  Alpha blockade increased again.  Add ACEi.  Mild orthostatic tachycardia, but concerned that BP still too high to safely add B blocker yet.  If cannot keep below 180/100 will need to restart Cardene infusion.  Maintain in ICU.  Discussed with surg onc team.
Patient seen and examined by me.  I have discussed my recommendation with the PA which are outlined above.
Seen and examined.    NAD  AAOx4, non focal  RRR  Non labored resp    BP control improving with increased meds.   Alpha blockade increased again.  Continues to have acceptable BP w/o Cardene drip.  OK for transfer out of ICU.
Seen and examined    NAD  AAox4  non labored resp  RRR    HTN meds adjusted.  Stop diuretic, pt becoming more orthostatic.  Alpha blockade switched to bid and dose increased.  Remains appropriate for transfer to floor.
I have personally provided the amount of critical care time documented below concurrently with the resident/fellow.  This time excludes time spent on separate procedures and time spent teaching. I have reviewed the resident’s / fellow’s documentation and I agree with the history, exam, and assessment and plan of care.

## 2021-08-25 ENCOUNTER — APPOINTMENT (OUTPATIENT)
Dept: NEUROLOGY | Facility: CLINIC | Age: 31
End: 2021-08-25

## 2021-10-12 ENCOUNTER — RX RENEWAL (OUTPATIENT)
Age: 31
End: 2021-10-12

## 2022-01-17 NOTE — DISCUSSION/SUMMARY
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, still with uncontrolled HTN (on amlodipine 10mg, atenolol 50mg, doxazosin 8mg), followed up with Endocrine in 8/2020 pending repeat MIBG scan, presented to [facility] on 9/2/20 after syncope/seizure episode at work along with HTN urgency 202/115, hospitalized until 9/4/20, TTE with pEF 60-65% and LVH, EEG with +epileptiform foci at the  R-temporal region, repeat serum renin/dasia and urine/plasma metanephrine were normal, unable to obtain MIBG scan during hospitalization, added spironolactone for BP control and hypokalemia, presented for initial outpatient cardiology visit seen on 9/2020, referral for bariatric surgery but did not attend, had visit with neurologist with adjustment in antiepileptics, now presents for 3 months follow up.\par \par \par  \par \par 1. Resistant HTN- secondary workup thus far negative, pending Endocrine follow up for MIBG scan; BP well controlled on current regimen. \par \par 2. Morbid obesity- along with HTN- referral for bariatric surgery evaluation. \par \par 3. Seizure disorder- continue AED, neurology follow up. \par \par 4. Recent syncope- in setting of seizure and HTN urgency; defer Holter/Event monitoring for now. \par \par \par Follow up in 3 months.

## 2022-01-17 NOTE — HISTORY OF PRESENT ILLNESS
[FreeTextEntry1] : 31M h/o morbid obesity (BMI 43), early onset HTN (dx age 19), seizure disorder and prior recurrent hospitalization for HTN emergencies, prior workup with elevated serum metanephrines and underwent robotic asisted L-adrenalectomy (8/2019) however pathology report did not show tumor and L-RP mass with mature ganglion and Schwann cells, still with uncontrolled HTN (on amlodipine 10mg, atenolol 50mg, doxazosin 8mg), followed up with Endocrine in 8/2020 pending repeat MIBG scan, presented to [facility] on 9/2/20 after syncope/seizure episode at work along with HTN urgency 202/115, hospitalized until 9/4/20, TTE with pEF 60-65% and LVH, EEG with +epileptiform foci at the  R-temporal region, repeat serum renin/dasia and urine/plasma metanephrine were normal, unable to obtain MIBG scan during hospitalization, added spironolactone for BP control and hypokalemia, presented for initial outpatient cardiology visit seen on 9/2020, referral for bariatric surgery but did not attend, had visit with neurologist with adjustment in antiepileptics, missed prior appointment in 12/2020, now presents for follow up.\par \par \par \par Prior visit 9/2020: \par Reports feeling well except recently got fired from his job due to his medical condition, he is applying for security position. Adherent with his medications, not yet obtain BP cuff; pending Neurology and Endocrine follow up; smokes marijuana but no other illicit drugs or alcohol use. Reportedly follow-up with his PMD and had repeat blood work told renal function stable.

## 2022-01-17 NOTE — ED ADULT NURSE NOTE - PMH
Vaccine Information Statement(s) or the Emergency Use Authorization was given today. This has been reviewed, questions answered, and verbal consent given by Patient for injection(s) and administration of Shingles.      Patient tolerated without incident. See immunization grid for documentation.     HTN (hypertension)

## 2022-01-18 ENCOUNTER — APPOINTMENT (OUTPATIENT)
Dept: CARDIOLOGY | Facility: CLINIC | Age: 32
End: 2022-01-18

## 2022-01-19 ENCOUNTER — APPOINTMENT (OUTPATIENT)
Dept: CARDIOLOGY | Facility: CLINIC | Age: 32
End: 2022-01-19
Payer: MEDICAID

## 2022-01-19 ENCOUNTER — NON-APPOINTMENT (OUTPATIENT)
Age: 32
End: 2022-01-19

## 2022-01-19 VITALS
OXYGEN SATURATION: 97 % | TEMPERATURE: 98.3 F | WEIGHT: 315 LBS | HEART RATE: 103 BPM | DIASTOLIC BLOOD PRESSURE: 118 MMHG | BODY MASS INDEX: 48.15 KG/M2 | SYSTOLIC BLOOD PRESSURE: 170 MMHG

## 2022-01-19 DIAGNOSIS — G40.802 OTHER EPILEPSY, NOT INTRACTABLE, W/OUT STATUS EPILEPTICUS: ICD-10-CM

## 2022-01-19 DIAGNOSIS — E66.01 MORBID (SEVERE) OBESITY DUE TO EXCESS CALORIES: ICD-10-CM

## 2022-01-19 DIAGNOSIS — I10 ESSENTIAL (PRIMARY) HYPERTENSION: ICD-10-CM

## 2022-01-19 PROCEDURE — 93000 ELECTROCARDIOGRAM COMPLETE: CPT

## 2022-01-19 PROCEDURE — 99214 OFFICE O/P EST MOD 30 MIN: CPT

## 2022-01-19 RX ORDER — HYDROXYZINE HYDROCHLORIDE 25 MG/1
25 TABLET ORAL
Qty: 15 | Refills: 0 | Status: DISCONTINUED | COMMUNITY
Start: 2022-01-07

## 2022-01-19 RX ORDER — BLOOD-GLUCOSE METER
KIT MISCELLANEOUS
Qty: 1 | Refills: 0 | Status: DISCONTINUED | COMMUNITY
Start: 2020-01-27 | End: 2022-01-19

## 2022-01-19 NOTE — DISCUSSION/SUMMARY
[FreeTextEntry1] : Mr. RENU VALLECILLO is a 31 year male with known severe uncontrolled HTN and seizure disorder. Patient has been non compliant with his medications and follow up. States that now that he is  will be compliant. Will renew all his medications.\par At the present time He is completely asymptomatic.\par I have recommended to continue the same medications.\par I also recommended lifestyle modification with weight loss and exercise.\par We will perform an echocardiogram to assess left ventricular and valvular function.\par We will perform routine laboratory tests\par Will discuss option of bariatric surgery\par Will discuss and refer for possible Renal denervation for HTN management\par Routine follow up in 6 months\par

## 2022-01-19 NOTE — REASON FOR VISIT
[Symptom and Test Evaluation] : symptom and test evaluation [Hypertension] : hypertension [FreeTextEntry1] : Uncontrolled malignant HTN

## 2022-01-19 NOTE — PHYSICAL EXAM
[Well Developed] : well developed [Well Nourished] : well nourished [No Acute Distress] : no acute distress [Obese] : obese [Normal Conjunctiva] : normal conjunctiva [Normal Venous Pressure] : normal venous pressure [No Carotid Bruit] : no carotid bruit [Normal S1, S2] : normal S1, S2 [No Murmur] : no murmur [No Rub] : no rub [No Gallop] : no gallop [Clear Lung Fields] : clear lung fields [Good Air Entry] : good air entry [No Respiratory Distress] : no respiratory distress  [Soft] : abdomen soft [Non Tender] : non-tender [No Masses/organomegaly] : no masses/organomegaly [Normal Bowel Sounds] : normal bowel sounds [Normal Gait] : normal gait [No Edema] : no edema [No Cyanosis] : no cyanosis [No Clubbing] : no clubbing [No Varicosities] : no varicosities [No Rash] : no rash [No Skin Lesions] : no skin lesions [Moves all extremities] : moves all extremities [No Focal Deficits] : no focal deficits [Normal Speech] : normal speech [Alert and Oriented] : alert and oriented [Normal memory] : normal memory [de-identified] : Protuberant

## 2022-01-19 NOTE — HISTORY OF PRESENT ILLNESS
[FreeTextEntry1] : 32 yo AA male,  no children with a PMH of morbid obesity (BMI 43), early onset HTN (dx age 19), seizure disorder and prior recurrent hospitalization for HTN emergencies, prior workup with elevated serum metanephrines and underwent robotic asisted L-adrenalectomy (8/2019) however pathology report did not show tumor and L-RP mass with mature ganglion and Schwann cells. Still with uncontrolled HTN (on amlodipine 10mg, atenolol 50mg, doxazosin 8mg), followed up with Endocrine in 8/2020 pending repeat MIBG scan.\mayra Presented to [facility] on 9/2/20 after syncope/seizure episode at work along with HTN urgency 202/115, hospitalized until 9/4/20, TTE with pEF 60-65% and LVH, EEG revealed epileptiform foci at the  R-temporal region, repeat serum renin/dasia and urine/plasma metanephrine were normal, unable to obtain MIBG scan during hospitalization, added spironolactone for BP control and hypokalemia.\mayra Presented for initial outpatient cardiology visit seen on 9/2020, referral for bariatric surgery but did not attend, had visit with neurologist with adjustment in antiepileptics.\mayra Last seen at the office in 12//2020. Since then lost to follow up. Seems that he stopped his medications. At the present time patient is completely asymptomatic and denies CP, SOB, orthopnea or PND. Denies palpitations, dizziness or ankle swelling. His BP was 170/118 this morning. Has not taken meds for a month? \mayra Under follow with Neurology for focal epilepsy characterized by focal to bilateral tonic clonic sz's. MRI unremarkable. EEG shows RT sharps. His drivers license has been suspended.\mayra Endocrine follow up and will be seeing them in 3/2021. Has not had blood work since 2020. \mayra Smokes marijuana but no other illicit drugs or alcohol use. \mayra Reportedly follow-up with his PMD and had repeat blood work told renal function stable. \mayra Received the COVID 19 vaccine.

## 2022-02-02 ENCOUNTER — APPOINTMENT (OUTPATIENT)
Dept: NEUROLOGY | Facility: CLINIC | Age: 32
End: 2022-02-02

## 2022-02-11 ENCOUNTER — APPOINTMENT (OUTPATIENT)
Dept: CARDIOLOGY | Facility: CLINIC | Age: 32
End: 2022-02-11

## 2022-03-18 ENCOUNTER — APPOINTMENT (OUTPATIENT)
Dept: NEUROLOGY | Facility: CLINIC | Age: 32
End: 2022-03-18
Payer: MEDICAID

## 2022-03-18 VITALS
HEART RATE: 73 BPM | HEIGHT: 72 IN | DIASTOLIC BLOOD PRESSURE: 105 MMHG | BODY MASS INDEX: 42.66 KG/M2 | WEIGHT: 315 LBS | OXYGEN SATURATION: 98 % | SYSTOLIC BLOOD PRESSURE: 154 MMHG | TEMPERATURE: 97.9 F

## 2022-03-18 PROCEDURE — 99214 OFFICE O/P EST MOD 30 MIN: CPT

## 2022-03-19 NOTE — ASSESSMENT
[FreeTextEntry1] : RENU VALLECILLO is 31 y/o RH man with a history of difficult-to-control HTN since 11yo, questionable thickening of the left adrenal gland up to 1.2cm concerning for pheochromocytoma s/p left adrenalectomy 8/15/19 at [facility] who presents for followup for focal epilepsy characterized by focal to bilateral tonic clonic sz's. MRI unremarkable. EEG shows RT sharps. Personally reviewed all images, labs, EEG and other studies. \par \par Sz free as long as he is compliant with ASM. Educated and emphasized importance of med compliance. All questions and concerns answered and addressed in detail to patient's complete satisfaction. Patient verbalized understanding and agreed to plan.\par \par - continue OXC 900mg BID\par - continue Nayzilam PRN for seizures \par - will be moving to [place] next Tuesday\par \par \par All relevant epilepsy AAN quality care measures were addressed and discussed with the patient.\par \par More than 50% of time spent counseling and educating patient about epilepsy specific safety issues including AED side effects and interactions, alcohol consumption, sleep deprivation, risks and driving privileges associated with the [facility] Guidelines, death related to seizures/SUDEP, seizure 1st aid and risks. Patient is educated on seizure precautions, including no driving, no operating machinery, no swimming or bathing, no climbing heights, or engage in any risky activities during which a seizure could cause further injury to pt or others. \par \par Personally reviewed all images, labs and other studies. Thoroughly went over side effects of medication, detailed direction and tapering/titrating schedule regarding medication administration. Discussed interaction of medication and contraceptives and how treatment may affect pregnancy. Patient is educated on seizure precautions, including instruction not to do following after a breakthrough sz - no driving, no operating machinery, no swimming or bathing, no climbing heights, or engage in any risky activities during which a seizure could cause further injury to pt or others.

## 2022-03-19 NOTE — HISTORY OF PRESENT ILLNESS
[FreeTextEntry1] : LAST OFFICE VISIT: 3/26/21\par \par PCP: Dr. Anastasiya Gillespie\par Wife: Zahida\par \par INTERIM HISTORY:\mayra Has gotten  since last visit. Because of wife’s job, will be moving to [place] next Tuesday. One seizure August of last year due to ASM noncompliance. Never has sz while on ASM.\par \par CURRENT ASM:\par OXC 900mg BID – started 10/2020, level 20 on 3/12/21\par Nayzilam PRN for seizures – started 10/2020\par \par \par PRIOR EPILEPSY HISTORY:\par 10/13/20: Mr. Barker is a 31 y/o RH man with a history of difficult-to-control HTN since 13yo, questionable thickening of the left adrenal gland up to 1.2cm concerning for pheochromocytoma s/p left adrenalectomy 8/15/19 at [facility] who presents today for post-hospital followup for recently diagnosed epilepsy. He is accompanied today by his fiancée Zahida. \par \par I saw the pt when he was hospitalized at [facility] 9/2 – 9/4/20. My note below:\par “This is a 30y RH Male with a history of difficult-to-control HTN since 13yo, questionable thickening of the left adrenal gland up to 1.2cm concerning for pheochromocytoma s/p left adrenalectomy 8/15/19 at [facility], one prior bilateral tonic clonic seizure on 3/12/19 who presented with 3 bilateral tonic clonic seizures on DOA. All 4 seizures have occurred in SBP in 190s to 240s. Epilepsy consulted to evaluate for epilepsy vs acute provoked symptomatic seizure secondary to HTN emergency.  \par \par First seizure on 3/2/19, witnessed by patient's father. MRI and rEEG were negative. So patient discharged without any antiepileptic medication. Had two witnessed seizures at work few minutes a part, but patient might have returned back to his baseline mentation in between the seizures. Then during interview in ER, had another bilateral tonic clonic seizure. Once hooked up to cvEEG, right temporal epileptiform discharges were seen. AED promptly started.”\par \par He was discharged home on LEV 1500mg BID. Pt then BIBA to [facility] on 10/8/20 for another focal to BL tonic clonic seizure, witnessed by stevenson. According to patient and his fiance, he ran out of medication and missed the evening dose of LEV. The following day he had two focal to BL tonic clonic seizures, within few minutes of each other, lasting 1.5-2 minutes each. \par \par Patient report adverse effect on LEV. He reports not being able to sleep, diaphoretic, anxious and angry since beginning LEV in September. CURRENT AED: LEV 1500mg BID - started 9/4/20, insomnia, diaphoretic, anxious and angry.\par \par === 3/26/21 ===\par Pt today is accompanied by Zahida gamino. At the last visit, LEV was switched to OXC due to side effect. However, med noncompliant has led to few breakthrough seizures. Last sz 3/12/21. No sz as long as pt is compliant. CURRENT ASM: OXC 900mg BID – started 10/2020; Nayzilam PRN for seizures – started 10/2020.\par \par \par SEIZURE DESCRIPTION AND TYPE:\par Type #1\par Severity: focal to bilateral tonic clonic seizure \par Onset: 3/12/19\par Quality & associated signs/symptoms: no aura -> figure of 4 with left arm extension -> full body convulsion, tongue bite, urinary and bowel incontinence -> postictal confusion and lethargy \par Duration: 1-2 mins\par Timing: one on 3/12/19, three on 9/3/20, two on 10/8/20, 3/12/21, 8/2021\par Modifying factors: all occurred either prior to initiation of ASM, or noncompliance with ASM\par Diurnal Variation: none\par \par EPILEPSY TYPE: focal epilepsy\par HISTORY OF TONIC-CLONIC SZ: yes\par HISTORY OF STATUS EPILEPTICUS: no  \par \par SEIZURE RISK FACTORS:\par Patient was a product of normal pregnancy and delivery. No history of febrile seizure, TBI, CNS infection, or FH of seizures.\par \par PREVIOUS ASM:\par LEV 1500mg BID - 9/4/20 to 12/2020, insomnia, diaphoretic, anxious and angry \par \par IMAGING: \par MRI head w/wo, 9/3/20 ([facility]): Limited by motion artifact. Visualized hippocampal structures are symmetric in morphology and signal intensity. \par \par MRI brain w/wo 3/13/19 ([facility]): Small right of midline posterior fossa arachnoid cyst.\par \par NEUROPHYSIOLOGY:\par cvEEG 9/3 – 9/4/20 ([facility]): 1) RT sharps; 2) intermittent slowing RT\par \par NEUROPSYCHOLOGY: \par none

## 2022-04-13 NOTE — PHYSICAL THERAPY INITIAL EVALUATION ADULT - FOLLOWS COMMANDS/ANSWERS QUESTIONS, REHAB EVAL
Occupational Therapy  Visit Type: treatment  Precautions:  Medical precautions:  fall risk; standard precautions.            Patient admitted c/o SOB. Code Blue called in ED. CPR initiated with emergent intubation. CRRT initiated. Extubated 4/10/2022.     PT/OT ordered. Activity as tolerated. Patient seen on CVICU unit.    PMHx: CHF with a preserved EF of 50%, CAD, hypertension, type 2 diabetes, ESRD     Lines:     Basic: capped IV, telemetry, rectal tube, continuous pulse oximetry and O2    Complex: arterial line      Lines in chart and on patient reviewed, precautions maintained throughout session.  Hearing: no hearing deficits  Vision:     Current vision: no visual deficits (Appropriate eye contact made when engaged)  Safety Measures: bed alarm, chair alarm and bed rails    SUBJECTIVE  Patient agreed to participate in therapy this date.    \"Do you think there is hope for me to recover?\"   Patient / Family Goal: return to previous functional status    OBJECTIVE   Level of consciousness: alert    Oriented to person, place, time and situation     Affect/Behavior: alert, cooperative and pleasant  Patient activity tolerance: 1 to 2 activity to rest  Functional Communication/Cognition       Form of communication:  Verbal    Commands: follows one step commands consistently.    Safety judgement: decreased awareness of need for assistance.    Awareness of deficits: decreased awareness of deficits.  Balance (trials in sec unless otherwise indicated)  Sitting:   - Static:  maximal assist and moderate assist     Bed mobility:      Repositioning in bed: total assist - non-dependent and 2 persons    Supine to sit: maximal assist and 2 persons    Sit to supine: maximal assist and 2 persons      Interventions    Training provided: activity tolerance, balance retraining, bed mobility training and transfer training  Skilled input: verbal instruction/cues and tactile instruction/cues  Verbal Consent: Writer verbally educated and  received verbal consent for hand placement, positioning of patient, and techniques to be performed today from patient         ASSESSMENT    Impairments: activity tolerance, balance, strength, safety awareness, bed mobility, cognitive, range of motion, coordination/proprioception, communication and decreased insight into deficits  Functional Limitations: bed mobility, functional mobility, grooming, eating, bathing, toileting, participating in meaningful/purposeful activities, functional transfers, community reintegration, dressing, showering, job performance and IADLs    Today's treatment focused on sitting tolerance, BUE + core strength, and overall activity tolerance.  The patient is demonstrating good progress as evidenced by ability to get OOB today, much more alert and able to follow simple directions. Pt worked on sitting EOB w/ CGA-MAX (A) to remain upright while working on reaching tasks in front of himself, pt sat upright for 15 mins. Pt is pleasant and compliant, asking multiple times if he can get better. Partial co-treat w/ PTA for bed transfers d/t amount of (A) required.    Recommended Consults: OT: Physical Medicine and Rehabilitation Physician     Discharge Recommendations    Recommendations for Discharge: OT IL: Patient requires 24 HOUR assistance to perform mobility and/or ADLs safely, Patient is appropriate for intensive daily Occupational Therapy with the oversight of a Physical Medicine and Rehabilitation physician          PT/OT ADL Equipment for Discharge: TBD post rehab      Skilled therapy is required to address these limitations in attempt to maximize the patient's independence.    End of Session:   Location: in chair  Safety measures: alarm system in place/re-engaged and call light within reach  Handoff to: nurse and nurse assistant    PLAN  Suggestions for next session as indicated:     OT Frequency: 3 days/week       Interventions: activity tolerance training, ADL retraining, balance, bed  mobility training, community reintegration, compensatory techniques, continued evaluation, equipment eval/education, functional transfer training, HEP training, manual techniques, neuromuscular reeducation, patient education, patient/family training, positioning, safety training, therapeutic activity, therapeutic exercise, transfer training and upper extremity strengthening/ROM  Agreement to plan and goals: patient agrees with goals and treatment plan      GOALS  Long Term Goals: (to be met by time of discharge from hospital)  Grooming: Patient will complete grooming tasks in sitting minimal assist.  Status: progressing/ongoing  Lower body dressing: Patient will complete lower body dressing in sitting moderate assist.  Status: progressing/ongoing  Toilet transfer: Patient will complete toilet transfer with gait belt and 2-wheeled walker, bedside commode, moderate assist.   Status: progressing/ongoing        Documented in the chart in the following areas: Assessment. Plan.      Therapy procedure time and total treatment time can be found documented on the Time Entry flowsheet   100% of the time

## 2022-05-20 ENCOUNTER — RX RENEWAL (OUTPATIENT)
Age: 32
End: 2022-05-20

## 2022-07-20 ENCOUNTER — APPOINTMENT (OUTPATIENT)
Dept: CARDIOLOGY | Facility: CLINIC | Age: 32
End: 2022-07-20

## 2022-08-05 ENCOUNTER — RX RENEWAL (OUTPATIENT)
Age: 32
End: 2022-08-05

## 2022-08-22 ENCOUNTER — APPOINTMENT (OUTPATIENT)
Dept: CARDIOLOGY | Facility: CLINIC | Age: 32
End: 2022-08-22

## 2022-08-31 ENCOUNTER — APPOINTMENT (OUTPATIENT)
Dept: CARDIOLOGY | Facility: CLINIC | Age: 32
End: 2022-08-31

## 2022-09-15 ENCOUNTER — RX RENEWAL (OUTPATIENT)
Age: 32
End: 2022-09-15

## 2022-09-27 ENCOUNTER — RX RENEWAL (OUTPATIENT)
Age: 32
End: 2022-09-27

## 2022-09-27 RX ORDER — AMLODIPINE BESYLATE 10 MG/1
10 TABLET ORAL DAILY
Qty: 30 | Refills: 0 | Status: ACTIVE | COMMUNITY
Start: 2019-08-22 | End: 1900-01-01

## 2022-09-27 RX ORDER — ATENOLOL 100 MG/1
100 TABLET ORAL DAILY
Qty: 30 | Refills: 0 | Status: ACTIVE | COMMUNITY
Start: 2020-09-29 | End: 1900-01-01

## 2022-09-27 RX ORDER — DOXAZOSIN 8 MG/1
8 TABLET ORAL
Qty: 30 | Refills: 0 | Status: ACTIVE | COMMUNITY
Start: 2020-09-29 | End: 1900-01-01

## 2022-09-27 RX ORDER — OXCARBAZEPINE 600 MG/1
600 TABLET, FILM COATED ORAL TWICE DAILY
Qty: 90 | Refills: 0 | Status: ACTIVE | COMMUNITY
Start: 2020-10-13 | End: 1900-01-01

## 2022-09-27 RX ORDER — SPIRONOLACTONE 25 MG/1
25 TABLET ORAL
Qty: 30 | Refills: 0 | Status: ACTIVE | COMMUNITY
Start: 2020-09-21 | End: 1900-01-01

## 2023-01-18 ENCOUNTER — APPOINTMENT (OUTPATIENT)
Dept: NEUROLOGY | Facility: CLINIC | Age: 33
End: 2023-01-18

## 2023-02-19 NOTE — PHYSICAL THERAPY INITIAL EVALUATION ADULT - ADDITIONAL COMMENTS
owns and uses kenna axillary crutches, 12 steps 1 rail to enter home(bumps up stairs), no stairs within home No

## 2023-08-30 NOTE — ED PROVIDER NOTE - NS ED ATTENDING STATEMENT MOD
Anesthesia Type: 1% lidocaine with 1:100,000 epinephrine and 408mcg clindamycin/ml and a 1:10 solution of 8.4% sodium bicarbonate Attending Only

## 2023-10-18 NOTE — ED PROVIDER NOTE - SKIN, MLM
Skin normal color for race, warm, dry and intact. No evidence of rash. Hydroxychloroquine Counseling:  I discussed with the patient that a baseline ophthalmologic exam is needed at the start of therapy and every year thereafter while on therapy. A CBC may also be warranted for monitoring.  The side effects of this medication were discussed with the patient, including but not limited to agranulocytosis, aplastic anemia, seizures, rashes, retinopathy, and liver toxicity. Patient instructed to call the office should any adverse effect occur.  The patient verbalized understanding of the proper use and possible adverse effects of Plaquenil.  All the patient's questions and concerns were addressed.

## 2024-04-16 NOTE — DIETITIAN INITIAL EVALUATION ADULT. - PROBLEM SELECTOR PROBLEM 3
"Pt provided a record of recent BP's taken at home. Pt also wrote note onto this record asking \"If I take lisinopril should it be taken AM or PM?\". Most recent home BP added to chart in \"Pt Reported Vitals\", and tele encounter routed to PCP.     Full list of BP's reported by pt:  4/10 147/67  4/11 136/68, 148/75  4/12 150/65  4/13 138/83  4/14 147/74, 141/79  4/15 141/63  4/16 146/77, 134/60    Kaylen Christensen RN    " Bacteriuria

## 2024-04-24 NOTE — DISCHARGE NOTE PROVIDER - HOSPITAL COURSE
HPI:    30yo M with PMHx of hypertension, single episode seizure (3/2019) due to severely elevated BP with normal MRI brain and EEG presented to ED this past month with elevated BP. He had hx of long standing hx of global diaphoresis, sweaty palms and intermittent HA's. During this recent hospital admission, work-up including MRI revealed questionable slight nodular thickening of the left adrenal gland measuring up to 1.2 cm, also urine studies with elevated metanephrine levels concerning for pheochromocytoma. BP was controlled with multiple medications this past admission and was sent home. Presents to the ED today for prep of elective robotic assisted laparoscopic adrenalectomy on 8/15. Cardiology and Medicine both cleared patient for OR. Endocrinology was consulted. If patient continues to have persistent hypertension post adrenalectomy, recommend MIBG scan or PET scan outpatient to r/o paraganglioma. On 8/15, patient underwent robot-assisted left adrenalectomy. Patient went to SICU post-operatively for close monitoring. No complications arose and patient was transferred to floor. Patient was continued on amlodipine and atenolol for BP control with no issues. Patient was tolerating diet well. Pain was well controlled. Patient had BM and was OOB and ambulating without assistance. Patient was stable for discharge on 8/18/19. Detail Level: Detailed

## 2025-04-25 NOTE — ED ADULT TRIAGE NOTE - CADM TRG TX PRIOR TO ARRIVAL
holding midodrine and per history patient had not required it since last hospitalization      see ambulance record